# Patient Record
Sex: MALE | Race: WHITE | NOT HISPANIC OR LATINO | URBAN - METROPOLITAN AREA
[De-identification: names, ages, dates, MRNs, and addresses within clinical notes are randomized per-mention and may not be internally consistent; named-entity substitution may affect disease eponyms.]

---

## 2017-05-11 ENCOUNTER — OUTPATIENT (OUTPATIENT)
Dept: OUTPATIENT SERVICES | Facility: HOSPITAL | Age: 52
LOS: 1 days | End: 2017-05-11
Payer: COMMERCIAL

## 2017-05-11 ENCOUNTER — RESULT REVIEW (OUTPATIENT)
Age: 52
End: 2017-05-11

## 2017-05-11 DIAGNOSIS — D12.0 BENIGN NEOPLASM OF CECUM: ICD-10-CM

## 2017-05-11 PROCEDURE — 88305 TISSUE EXAM BY PATHOLOGIST: CPT | Mod: 26

## 2017-05-11 PROCEDURE — 88305 TISSUE EXAM BY PATHOLOGIST: CPT

## 2017-05-11 PROCEDURE — 45381 COLONOSCOPY SUBMUCOUS NJX: CPT

## 2017-05-11 PROCEDURE — 45385 COLONOSCOPY W/LESION REMOVAL: CPT

## 2017-05-11 PROCEDURE — 45380 COLONOSCOPY AND BIOPSY: CPT | Mod: XS

## 2017-05-12 LAB — SURGICAL PATHOLOGY STUDY: SIGNIFICANT CHANGE UP

## 2017-08-19 ENCOUNTER — TRANSCRIPTION ENCOUNTER (OUTPATIENT)
Age: 52
End: 2017-08-19

## 2018-09-05 ENCOUNTER — INPATIENT (INPATIENT)
Facility: HOSPITAL | Age: 53
LOS: 19 days | Discharge: HOME | DRG: 871 | End: 2018-09-25
Attending: INTERNAL MEDICINE | Admitting: INTERNAL MEDICINE
Payer: COMMERCIAL

## 2018-09-05 VITALS
DIASTOLIC BLOOD PRESSURE: 61 MMHG | OXYGEN SATURATION: 95 % | RESPIRATION RATE: 23 BRPM | TEMPERATURE: 99 F | HEART RATE: 91 BPM | SYSTOLIC BLOOD PRESSURE: 97 MMHG

## 2018-09-05 DIAGNOSIS — R18.8 OTHER ASCITES: ICD-10-CM

## 2018-09-05 DIAGNOSIS — K72.00 ACUTE AND SUBACUTE HEPATIC FAILURE WITHOUT COMA: ICD-10-CM

## 2018-09-05 DIAGNOSIS — A41.9 SEPSIS, UNSPECIFIED ORGANISM: ICD-10-CM

## 2018-09-05 DIAGNOSIS — F10.10 ALCOHOL ABUSE, UNCOMPLICATED: ICD-10-CM

## 2018-09-05 DIAGNOSIS — R09.89 OTHER SPECIFIED SYMPTOMS AND SIGNS INVOLVING THE CIRCULATORY AND RESPIRATORY SYSTEMS: ICD-10-CM

## 2018-09-05 DIAGNOSIS — D53.9 NUTRITIONAL ANEMIA, UNSPECIFIED: ICD-10-CM

## 2018-09-05 DIAGNOSIS — M72.0 PALMAR FASCIAL FIBROMATOSIS [DUPUYTREN]: Chronic | ICD-10-CM

## 2018-09-05 DIAGNOSIS — E87.1 HYPO-OSMOLALITY AND HYPONATREMIA: ICD-10-CM

## 2018-09-05 DIAGNOSIS — K70.10 ALCOHOLIC HEPATITIS WITHOUT ASCITES: ICD-10-CM

## 2018-09-05 DIAGNOSIS — I48.91 UNSPECIFIED ATRIAL FIBRILLATION: ICD-10-CM

## 2018-09-05 LAB
ALBUMIN SERPL ELPH-MCNC: 2.5 G/DL — LOW (ref 3.3–5)
ALP SERPL-CCNC: 153 U/L — HIGH (ref 40–120)
ALT FLD-CCNC: 87 U/L — HIGH (ref 10–45)
AMMONIA BLD-MCNC: 55 UMOL/L — SIGNIFICANT CHANGE UP (ref 11–55)
ANION GAP SERPL CALC-SCNC: 12 MMOL/L — SIGNIFICANT CHANGE UP (ref 5–17)
APTT BLD: 45.4 SEC — HIGH (ref 27.5–37.4)
AST SERPL-CCNC: 213 U/L — HIGH (ref 10–40)
BASE EXCESS BLDV CALC-SCNC: 4.2 MMOL/L — HIGH (ref -2–2)
BASOPHILS # BLD AUTO: 0 K/UL — SIGNIFICANT CHANGE UP (ref 0–0.2)
BASOPHILS NFR BLD AUTO: 0.3 % — SIGNIFICANT CHANGE UP (ref 0–2)
BILIRUB SERPL-MCNC: 9.2 MG/DL — HIGH (ref 0.2–1.2)
BUN SERPL-MCNC: 5 MG/DL — LOW (ref 7–23)
CA-I SERPL-SCNC: 1.02 MMOL/L — LOW (ref 1.12–1.3)
CALCIUM SERPL-MCNC: 8.1 MG/DL — LOW (ref 8.4–10.5)
CHLORIDE BLDV-SCNC: 83 MMOL/L — LOW (ref 96–108)
CHLORIDE SERPL-SCNC: 79 MMOL/L — LOW (ref 96–108)
CO2 BLDV-SCNC: 28 MMOL/L — SIGNIFICANT CHANGE UP (ref 22–30)
CO2 SERPL-SCNC: 24 MMOL/L — SIGNIFICANT CHANGE UP (ref 22–31)
CREAT SERPL-MCNC: 0.72 MG/DL — SIGNIFICANT CHANGE UP (ref 0.5–1.3)
EOSINOPHIL # BLD AUTO: 0.2 K/UL — SIGNIFICANT CHANGE UP (ref 0–0.5)
EOSINOPHIL NFR BLD AUTO: 1.2 % — SIGNIFICANT CHANGE UP (ref 0–6)
ETHANOL SERPL-MCNC: 12 MG/DL — HIGH (ref 0–10)
GAS PNL BLDV: 114 MMOL/L — CRITICAL LOW (ref 136–145)
GAS PNL BLDV: SIGNIFICANT CHANGE UP
GLUCOSE BLDV-MCNC: 129 MG/DL — HIGH (ref 70–99)
GLUCOSE SERPL-MCNC: 127 MG/DL — HIGH (ref 70–99)
HCO3 BLDV-SCNC: 27 MMOL/L — SIGNIFICANT CHANGE UP (ref 21–29)
HCT VFR BLD CALC: 36 % — LOW (ref 39–50)
HCT VFR BLDA CALC: 34 % — LOW (ref 39–50)
HGB BLD CALC-MCNC: 11.1 G/DL — LOW (ref 13–17)
HGB BLD-MCNC: 12.2 G/DL — LOW (ref 13–17)
INR BLD: 5.96 RATIO — CRITICAL HIGH (ref 0.88–1.16)
LACTATE BLDV-MCNC: 3.3 MMOL/L — HIGH (ref 0.7–2)
LYMPHOCYTES # BLD AUTO: 0.9 K/UL — LOW (ref 1–3.3)
LYMPHOCYTES # BLD AUTO: 5.9 % — LOW (ref 13–44)
MAGNESIUM SERPL-MCNC: 1.8 MG/DL — SIGNIFICANT CHANGE UP (ref 1.6–2.6)
MCHC RBC-ENTMCNC: 34 GM/DL — SIGNIFICANT CHANGE UP (ref 32–36)
MCHC RBC-ENTMCNC: 36.6 PG — HIGH (ref 27–34)
MCV RBC AUTO: 107 FL — HIGH (ref 80–100)
MONOCYTES # BLD AUTO: 1.3 K/UL — HIGH (ref 0–0.9)
MONOCYTES NFR BLD AUTO: 9.1 % — SIGNIFICANT CHANGE UP (ref 2–14)
NEUTROPHILS # BLD AUTO: 12.1 K/UL — HIGH (ref 1.8–7.4)
NEUTROPHILS NFR BLD AUTO: 83.4 % — HIGH (ref 43–77)
PCO2 BLDV: 35 MMHG — SIGNIFICANT CHANGE UP (ref 35–50)
PH BLDV: 7.5 — HIGH (ref 7.35–7.45)
PLAT MORPH BLD: NORMAL — SIGNIFICANT CHANGE UP
PLATELET # BLD AUTO: 154 K/UL — SIGNIFICANT CHANGE UP (ref 150–400)
PO2 BLDV: 40 MMHG — SIGNIFICANT CHANGE UP (ref 25–45)
POTASSIUM BLDV-SCNC: 3.3 MMOL/L — LOW (ref 3.5–5.3)
POTASSIUM SERPL-MCNC: 4.3 MMOL/L — SIGNIFICANT CHANGE UP (ref 3.5–5.3)
POTASSIUM SERPL-SCNC: 4.3 MMOL/L — SIGNIFICANT CHANGE UP (ref 3.5–5.3)
PROT SERPL-MCNC: 7.2 G/DL — SIGNIFICANT CHANGE UP (ref 6–8.3)
PROTHROM AB SERPL-ACNC: 66.7 SEC — HIGH (ref 9.8–12.7)
RBC # BLD: 3.35 M/UL — LOW (ref 4.2–5.8)
RBC # FLD: 12.7 % — SIGNIFICANT CHANGE UP (ref 10.3–14.5)
RBC BLD AUTO: SIGNIFICANT CHANGE UP
SAO2 % BLDV: 72 % — SIGNIFICANT CHANGE UP (ref 67–88)
SODIUM SERPL-SCNC: 117 MMOL/L — CRITICAL LOW (ref 135–145)
WBC # BLD: 15.3 K/UL — HIGH (ref 3.8–10.5)
WBC # FLD AUTO: 15.3 K/UL — HIGH (ref 3.8–10.5)

## 2018-09-05 PROCEDURE — 71045 X-RAY EXAM CHEST 1 VIEW: CPT | Mod: 26

## 2018-09-05 PROCEDURE — 99223 1ST HOSP IP/OBS HIGH 75: CPT

## 2018-09-05 PROCEDURE — 99285 EMERGENCY DEPT VISIT HI MDM: CPT

## 2018-09-05 RX ORDER — CEFTRIAXONE 500 MG/1
1 INJECTION, POWDER, FOR SOLUTION INTRAMUSCULAR; INTRAVENOUS ONCE
Qty: 0 | Refills: 0 | Status: COMPLETED | OUTPATIENT
Start: 2018-09-05 | End: 2018-09-05

## 2018-09-05 RX ORDER — THIAMINE MONONITRATE (VIT B1) 100 MG
100 TABLET ORAL DAILY
Qty: 0 | Refills: 0 | Status: DISCONTINUED | OUTPATIENT
Start: 2018-09-05 | End: 2018-09-25

## 2018-09-05 RX ORDER — INFLUENZA VIRUS VACCINE 15; 15; 15; 15 UG/.5ML; UG/.5ML; UG/.5ML; UG/.5ML
0.5 SUSPENSION INTRAMUSCULAR ONCE
Qty: 0 | Refills: 0 | Status: DISCONTINUED | OUTPATIENT
Start: 2018-09-05 | End: 2018-09-25

## 2018-09-05 RX ORDER — CEFTRIAXONE 500 MG/1
INJECTION, POWDER, FOR SOLUTION INTRAMUSCULAR; INTRAVENOUS
Qty: 0 | Refills: 0 | Status: DISCONTINUED | OUTPATIENT
Start: 2018-09-05 | End: 2018-09-10

## 2018-09-05 RX ORDER — SODIUM CHLORIDE 9 MG/ML
1000 INJECTION INTRAMUSCULAR; INTRAVENOUS; SUBCUTANEOUS ONCE
Qty: 0 | Refills: 0 | Status: COMPLETED | OUTPATIENT
Start: 2018-09-05 | End: 2018-09-05

## 2018-09-05 RX ORDER — METOPROLOL TARTRATE 50 MG
25 TABLET ORAL DAILY
Qty: 0 | Refills: 0 | Status: DISCONTINUED | OUTPATIENT
Start: 2018-09-05 | End: 2018-09-05

## 2018-09-05 RX ORDER — CEFTRIAXONE 500 MG/1
1 INJECTION, POWDER, FOR SOLUTION INTRAMUSCULAR; INTRAVENOUS EVERY 24 HOURS
Qty: 0 | Refills: 0 | Status: DISCONTINUED | OUTPATIENT
Start: 2018-09-06 | End: 2018-09-10

## 2018-09-05 RX ORDER — FOLIC ACID 0.8 MG
1 TABLET ORAL DAILY
Qty: 0 | Refills: 0 | Status: DISCONTINUED | OUTPATIENT
Start: 2018-09-05 | End: 2018-09-25

## 2018-09-05 RX ADMIN — Medication 25 MILLIGRAM(S): at 23:21

## 2018-09-05 RX ADMIN — CEFTRIAXONE 100 GRAM(S): 500 INJECTION, POWDER, FOR SOLUTION INTRAMUSCULAR; INTRAVENOUS at 19:13

## 2018-09-05 RX ADMIN — SODIUM CHLORIDE 1000 MILLILITER(S): 9 INJECTION INTRAMUSCULAR; INTRAVENOUS; SUBCUTANEOUS at 17:39

## 2018-09-05 RX ADMIN — Medication 25 MILLIGRAM(S): at 17:42

## 2018-09-05 NOTE — H&P ADULT - PROBLEM SELECTOR PLAN 2
Hypervolumic hyponatremia likely 2/2 ascities  Check serum osm, urine sodium (likely high, on lasix) and urine osm  Fluid restrict 1L unless becomes hypotensive  No signs of symptomatic hyopnatremia, will get repeat BMP in 6 hours, goal is 123 by tomorrow afternoon.   Consider renal consult.

## 2018-09-05 NOTE — H&P ADULT - HISTORY OF PRESENT ILLNESS
52M pmhx of Afib on xarelto, alcoholic hepatitis with cirrhosis and ascities who presents from outpt office for evalution of worsening LE edema.  He drinks 4-6 beers or hard liquor drinks per day every day for the last > 20 years.  Unsure if he has W/D becuase he has never been a day w/o drinking.  He denies any fevers or chills, no nausea, vomiting or diarrhea.  His abdomen has been swollen for a while and he recently had a sonogram which showed ascites.  His legs started swelling last week which was concerning to him so he went to his PCP today.  He denies any confusion or disorientation, no seizures or fatigue. 52M pmhx of Afib on xarelto, alcoholic hepatitis with cirrhosis and ascities who presents from outpt office for evalution of worsening LE edema.  He drinks 4-6 beers or hard liquor drinks per day every day for the last > 20 years.  Unsure if he has W/D becuase he has never been a day w/o drinking.  He denies any fevers or chills, no nausea, vomiting or diarrhea.  His abdomen has been swollen for a while and he recently had a sonogram which showed ascites.  His legs started swelling last week which was concerning to him so he went to his PCP today.  He denies any confusion or disorientation, no seizures or fatigue. Occasional black/dark stools last was this morning.  He says that he had a Cscope last year with polpys for which he was supposed to see surgery (doesn't have pathology) but has not been able to in the past year.  He says he has not had a screening EGD for varices and denies having major GI bleed in the past.  Has admitted to some recent nosebleeds. Has chronic slight cough, not productive of sputum.

## 2018-09-05 NOTE — ED ADULT NURSE NOTE - NSIMPLEMENTINTERV_GEN_ALL_ED
Implemented All Fall with Harm Risk Interventions:  [place] to call system. Call bell, personal items and telephone within reach. Instruct patient to call for assistance. Room bathroom lighting operational. Non-slip footwear when patient is off stretcher. Physically safe environment: no spills, clutter or unnecessary equipment. Stretcher in lowest position, wheels locked, appropriate side rails in place. Provide visual cue, wrist band, yellow gown, etc. Monitor gait and stability. Monitor for mental status changes and reorient to person, place, and time. Review medications for side effects contributing to fall risk. Reinforce activity limits and safety measures with patient and family. Provide visual clues: red socks.

## 2018-09-05 NOTE — ED PROVIDER NOTE - ATTENDING CONTRIBUTION TO CARE
53y/o M with h/o alcohol use (daily x 10 y) and worsening liver function on recent labs, presenting today with worsening abdominal distension, and b/l lower extremity edema.  Patient noted to be jaundiced, with palpable abdominal ascites, but no abdominal tenderness, afebrile.  A&Ox3, no signs of encephalopathy.  Concerned for worsening liver function and/or fulminant hepatic failure; will check cbc, cmp, lipase, and plan for admission for further evaluation/management.

## 2018-09-05 NOTE — H&P ADULT - PROBLEM SELECTOR PLAN 3
Diagnostic paracentesis before therapeutic paracentesis for ascites 2/2 alcoholic hepatitis. Diagnostic paracentesis before therapeutic paracentesis for ascites 2/2 alcoholic hepatitis.  Ordered Paracentesis

## 2018-09-05 NOTE — H&P ADULT - NSHPPHYSICALEXAM_GEN_ALL_CORE
Vital Signs Last 24 Hrs  T(C): 36.8 (05 Sep 2018 16:21), Max: 37 (05 Sep 2018 14:47)  T(F): 98.3 (05 Sep 2018 16:21), Max: 98.6 (05 Sep 2018 14:47)  HR: 91 (05 Sep 2018 17:43) (91 - 92)  BP: 115/68 (05 Sep 2018 17:43) (97/61 - 127/80)  BP(mean): --  RR: 18 (05 Sep 2018 17:43) (18 - 23)  SpO2: 95% (05 Sep 2018 17:43) (95% - 96%)

## 2018-09-05 NOTE — ED ADULT NURSE REASSESSMENT NOTE - NS ED NURSE REASSESS COMMENT FT1
Received report from Yolie FRANKLIN. Patient resting and comfortable. No complaints of pain at this time. VSS. Safety and comfort measures provided and maintained. CIWA=0. Pending bed.

## 2018-09-05 NOTE — H&P ADULT - PROBLEM SELECTOR PLAN 1
Start Ceftraixone( Would prefer to wait for paracentesis however pt was hypotensive on arrival and has severe sepsis)  Will stop with IVF given his total body overload should be careful with fluids  Repeat lactate now  Check blood cultures, urine culture, Urinalysis and Chest xray. Start Ceftraixone( Would prefer to wait for paracentesis however pt was hypotensive on arrival and has severe sepsis) to cover for SBP and strep cellulitis of leg (low suspicion).   Will stop with IVF given his total body overload should be careful with fluids  Repeat lactate now  Check blood cultures, urine culture, Urinalysis and Chest xray. Start Ceftraixone( Would prefer to wait for paracentesis however pt was hypotensive on arrival and has severe sepsis) to cover for SBP and strep cellulitis of leg (low suspicion).   Will stop with IVF given his total body overload should be careful with fluids  Repeat lactate now  Check blood cultures, urine culture, Urinalysis and Chest xray.  Lactate unlikely to clear given underlying liver disease.

## 2018-09-05 NOTE — ED ADULT NURSE NOTE - OBJECTIVE STATEMENT
53 y/o male presenting to the ED via walking in complaining of worsening of bilateral lower extremity edema, jaundice and abdominal bloating. Hx of Afib on Xarelto, alcohol induced liver disease, daily alcohol intake. Patient states went to PMD who sent him to the ED. Radha 51 y/o male presenting to the ED via walking in complaining of worsening of bilateral lower extremity edema, jaundice and abdominal bloating. Hx of Afib on Xarelto, alcohol induced liver disease, daily alcohol intake. Patient states last drink was this morning. Denies ever experiencing, seizures, tremors or other withdrawal symptoms. Patient has 4-6 drinks a day. Patient states went to PMD who sent him to the ED. Patient states edema in lower extremities and abdomen started in June of 2018 but worsened acutely x 2 weeks. +3 pitting edema noted to bilateral lower extremities accompanied by redness. Abdomen distended and firm to palpation. Patient denies abdominal pain, dizziness, n/v/d, numbness, tingling, difficulty breathing, SOB. Patient states feels some discomfort in lower extremities when walking but no pain. Positive jaundice. A&OX3. Safety and comfort measures provided.

## 2018-09-05 NOTE — H&P ADULT - PROBLEM SELECTOR PLAN 5
Continue B12 and Folic acid Continue B12 and Folic acid  No signs of active bleeding, never had screening EGD for varices. Brown stool on rectal.

## 2018-09-05 NOTE — ED PROVIDER NOTE - RAPID ASSESSMENT
Tianna Miguel ([facility]) attesting for Dr. Khan: 52M with PMHx of paroxysmal AFib, alcohol induced liver disease, long-standing alcohol use, currently on Xarelto, presents to the ED as directed by PMD with LE edema, onset June 2018, jaundice and abdominal bloating. Last drink was earlier this morning. Admits to having 4-6 drinks/day. Outpatient US done 7/31 remarkable for moderate amount of ascites. Pt was initially placed on Lasix, now on spironolactone.   Dr. Jigar Mckay (PMD with [facility])

## 2018-09-05 NOTE — ED PROVIDER NOTE - MEDICAL DECISION MAKING DETAILS
51y/o M with h/o alcohol use (daily x 10 y) and worsening liver function on recent labs, presenting today with worsening abdominal distension, and b/l lower extremity edema.  Patient noted to be jaundiced, with palpable abdominal ascites, but no abdominal tenderness, afebrile.  A&Ox3, no signs of encephalopathy.  Concerned for worsening liver function and/or fulminant hepatic failure; will check cbc, cmp, lipase, and plan for admission for further evaluation/management.

## 2018-09-05 NOTE — ED PROVIDER NOTE - OBJECTIVE STATEMENT
52 M w paroxysmal afib on xarelto, alcohol induced liver disease, alcohol abuse daily presents with gradual onset of bilateral lower extremity and abdominal edema since June. He was sent in by [facility] Jigar Mckay MD

## 2018-09-05 NOTE — H&P ADULT - NSHPLABSRESULTS_GEN_ALL_CORE
12.2   15.3  )-----------( 154      ( 05 Sep 2018 16:09 )             36.0     05 Sep 2018 16:09    117    |  79     |  5      ----------------------------<  127    4.3     |  24     |  0.72     Ca    8.1        05 Sep 2018 16:09  Phos  2.4       05 Sep 2018 16:09  Mg     1.8       05 Sep 2018 16:09    TPro  7.2    /  Alb  2.5    /  TBili  9.2    /  DBili  3.9    /  AST  213    /  ALT  87     /  AlkPhos  153    05 Sep 2018 16:09    LIVER FUNCTIONS - ( 05 Sep 2018 16:09 )  Alb: 2.5 g/dL / Pro: 7.2 g/dL / ALK PHOS: 153 U/L / ALT: 87 U/L / AST: 213 U/L / GGT: x           PT/INR - ( 05 Sep 2018 16:22 )   PT: 66.7 sec;   INR: 5.96 ratio         PTT - ( 05 Sep 2018 16:22 )  PTT:45.4 sec  CAPILLARY BLOOD GLUCOSE

## 2018-09-05 NOTE — H&P ADULT - PROBLEM SELECTOR PLAN 4
Cassandra's discriminant function is 258 which is confounded by use of Xarelto. Regardless, pt is not a transplant candidate as he is currently abusing ETOH, last drink was this morning.  Encourage alcohol cessation, patient has very poor prognosis at this point. Cassandra's discriminant function is 258 which is confounded by use of Xarelto. Regardless, pt is not a transplant candidate as he is currently abusing ETOH, last drink was this morning.  Encourage alcohol cessation, patient has very poor prognosis at this point.  Need to rule out infection before starting steroids for this. Cassandra's discriminant function is 258 which is confounded by use of Xarelto. Regardless, pt is not a transplant candidate as he is currently abusing ETOH, last drink was this morning.  Encourage alcohol cessation, patient has very poor prognosis at this point.  Need to rule out infection before starting steroids for this.  Lactate unlikely to clear given liver disease.

## 2018-09-05 NOTE — H&P ADULT - PROBLEM SELECTOR PLAN 6
Currently regular on exam, check EKG  Will hold Xarelto in setting of recurrent nosebleeds and INR >5. Currently regular on exam, check EKG  Will hold Xarelto in setting of recurrent nosebleeds and INR >5.  Holding Metoprolol in setting of severe sepsis and hypotension.

## 2018-09-06 LAB
ALBUMIN SERPL ELPH-MCNC: 2.2 G/DL — LOW (ref 3.3–5)
ALP SERPL-CCNC: 140 U/L — HIGH (ref 40–120)
ALT FLD-CCNC: 76 U/L — HIGH (ref 10–45)
ANION GAP SERPL CALC-SCNC: 11 MMOL/L — SIGNIFICANT CHANGE UP (ref 5–17)
ANION GAP SERPL CALC-SCNC: 12 MMOL/L — SIGNIFICANT CHANGE UP (ref 5–17)
ANION GAP SERPL CALC-SCNC: 13 MMOL/L — SIGNIFICANT CHANGE UP (ref 5–17)
ANION GAP SERPL CALC-SCNC: 14 MMOL/L — SIGNIFICANT CHANGE UP (ref 5–17)
ANION GAP SERPL CALC-SCNC: 15 MMOL/L — SIGNIFICANT CHANGE UP (ref 5–17)
APPEARANCE UR: ABNORMAL
APTT BLD: 42.2 SEC — HIGH (ref 27.5–37.4)
AST SERPL-CCNC: 181 U/L — HIGH (ref 10–40)
BILIRUB SERPL-MCNC: 9 MG/DL — HIGH (ref 0.2–1.2)
BILIRUB UR-MCNC: ABNORMAL
BUN SERPL-MCNC: 6 MG/DL — LOW (ref 7–23)
BUN SERPL-MCNC: 6 MG/DL — LOW (ref 7–23)
BUN SERPL-MCNC: 7 MG/DL — SIGNIFICANT CHANGE UP (ref 7–23)
BUN SERPL-MCNC: 8 MG/DL — SIGNIFICANT CHANGE UP (ref 7–23)
BUN SERPL-MCNC: 9 MG/DL — SIGNIFICANT CHANGE UP (ref 7–23)
CALCIUM SERPL-MCNC: 7.6 MG/DL — LOW (ref 8.4–10.5)
CALCIUM SERPL-MCNC: 7.8 MG/DL — LOW (ref 8.4–10.5)
CALCIUM SERPL-MCNC: 7.9 MG/DL — LOW (ref 8.4–10.5)
CALCIUM SERPL-MCNC: 8 MG/DL — LOW (ref 8.4–10.5)
CALCIUM SERPL-MCNC: 8.3 MG/DL — LOW (ref 8.4–10.5)
CHLORIDE SERPL-SCNC: 78 MMOL/L — LOW (ref 96–108)
CHLORIDE SERPL-SCNC: 78 MMOL/L — LOW (ref 96–108)
CHLORIDE SERPL-SCNC: 80 MMOL/L — LOW (ref 96–108)
CHLORIDE SERPL-SCNC: 82 MMOL/L — LOW (ref 96–108)
CHLORIDE SERPL-SCNC: 83 MMOL/L — LOW (ref 96–108)
CO2 SERPL-SCNC: 23 MMOL/L — SIGNIFICANT CHANGE UP (ref 22–31)
CO2 SERPL-SCNC: 24 MMOL/L — SIGNIFICANT CHANGE UP (ref 22–31)
CO2 SERPL-SCNC: 24 MMOL/L — SIGNIFICANT CHANGE UP (ref 22–31)
COLOR SPEC: ABNORMAL
CREAT SERPL-MCNC: 0.58 MG/DL — SIGNIFICANT CHANGE UP (ref 0.5–1.3)
CREAT SERPL-MCNC: 0.68 MG/DL — SIGNIFICANT CHANGE UP (ref 0.5–1.3)
CREAT SERPL-MCNC: 0.69 MG/DL — SIGNIFICANT CHANGE UP (ref 0.5–1.3)
CREAT SERPL-MCNC: 0.76 MG/DL — SIGNIFICANT CHANGE UP (ref 0.5–1.3)
CREAT SERPL-MCNC: 0.88 MG/DL — SIGNIFICANT CHANGE UP (ref 0.5–1.3)
DIFF PNL FLD: NEGATIVE — SIGNIFICANT CHANGE UP
GLUCOSE SERPL-MCNC: 105 MG/DL — HIGH (ref 70–99)
GLUCOSE SERPL-MCNC: 113 MG/DL — HIGH (ref 70–99)
GLUCOSE SERPL-MCNC: 116 MG/DL — HIGH (ref 70–99)
GLUCOSE SERPL-MCNC: 121 MG/DL — HIGH (ref 70–99)
GLUCOSE SERPL-MCNC: 125 MG/DL — HIGH (ref 70–99)
GLUCOSE UR QL: NEGATIVE — SIGNIFICANT CHANGE UP
HCT VFR BLD CALC: 30.1 % — LOW (ref 39–50)
HGB BLD-MCNC: 10.8 G/DL — LOW (ref 13–17)
INR BLD: 3.75 RATIO — HIGH (ref 0.88–1.16)
INR BLD: 5.14 RATIO — CRITICAL HIGH (ref 0.88–1.16)
KETONES UR-MCNC: NEGATIVE — SIGNIFICANT CHANGE UP
LACTATE SERPL-SCNC: 2 MMOL/L — SIGNIFICANT CHANGE UP (ref 0.7–2)
LEUKOCYTE ESTERASE UR-ACNC: NEGATIVE — SIGNIFICANT CHANGE UP
MAGNESIUM SERPL-MCNC: 1.8 MG/DL — SIGNIFICANT CHANGE UP (ref 1.6–2.6)
MCHC RBC-ENTMCNC: 35.8 PG — HIGH (ref 27–34)
MCHC RBC-ENTMCNC: 35.9 GM/DL — SIGNIFICANT CHANGE UP (ref 32–36)
MCV RBC AUTO: 99.7 FL — SIGNIFICANT CHANGE UP (ref 80–100)
NITRITE UR-MCNC: NEGATIVE — SIGNIFICANT CHANGE UP
OSMOLALITY SERPL: 247 MOS/KG — LOW (ref 275–300)
OSMOLALITY UR: 422 MOS/KG — SIGNIFICANT CHANGE UP (ref 50–1200)
PH UR: 6 — SIGNIFICANT CHANGE UP (ref 5–8)
PLATELET # BLD AUTO: 147 K/UL — LOW (ref 150–400)
POTASSIUM SERPL-MCNC: 3.2 MMOL/L — LOW (ref 3.5–5.3)
POTASSIUM SERPL-MCNC: 3.4 MMOL/L — LOW (ref 3.5–5.3)
POTASSIUM SERPL-MCNC: 3.8 MMOL/L — SIGNIFICANT CHANGE UP (ref 3.5–5.3)
POTASSIUM SERPL-MCNC: 3.9 MMOL/L — SIGNIFICANT CHANGE UP (ref 3.5–5.3)
POTASSIUM SERPL-MCNC: 4 MMOL/L — SIGNIFICANT CHANGE UP (ref 3.5–5.3)
POTASSIUM SERPL-SCNC: 3.2 MMOL/L — LOW (ref 3.5–5.3)
POTASSIUM SERPL-SCNC: 3.4 MMOL/L — LOW (ref 3.5–5.3)
POTASSIUM SERPL-SCNC: 3.8 MMOL/L — SIGNIFICANT CHANGE UP (ref 3.5–5.3)
POTASSIUM SERPL-SCNC: 3.9 MMOL/L — SIGNIFICANT CHANGE UP (ref 3.5–5.3)
POTASSIUM SERPL-SCNC: 4 MMOL/L — SIGNIFICANT CHANGE UP (ref 3.5–5.3)
PROT SERPL-MCNC: 6.4 G/DL — SIGNIFICANT CHANGE UP (ref 6–8.3)
PROT UR-MCNC: SIGNIFICANT CHANGE UP
PROTHROM AB SERPL-ACNC: 42 SEC — HIGH (ref 9.8–12.7)
PROTHROM AB SERPL-ACNC: 60 SEC — HIGH (ref 10–13.1)
RBC # BLD: 3.02 M/UL — LOW (ref 4.2–5.8)
RBC # FLD: 14 % — SIGNIFICANT CHANGE UP (ref 10.3–14.5)
SODIUM SERPL-SCNC: 112 MMOL/L — CRITICAL LOW (ref 135–145)
SODIUM SERPL-SCNC: 114 MMOL/L — CRITICAL LOW (ref 135–145)
SODIUM SERPL-SCNC: 116 MMOL/L — CRITICAL LOW (ref 135–145)
SODIUM SERPL-SCNC: 120 MMOL/L — CRITICAL LOW (ref 135–145)
SODIUM SERPL-SCNC: 121 MMOL/L — LOW (ref 135–145)
SODIUM UR-SCNC: <20 MMOL/L — SIGNIFICANT CHANGE UP
SODIUM UR-SCNC: <20 MMOL/L — SIGNIFICANT CHANGE UP
SP GR SPEC: 1.02 — SIGNIFICANT CHANGE UP (ref 1.01–1.02)
UROBILINOGEN FLD QL: 8 MG/DL
WBC # BLD: 13.2 K/UL — HIGH (ref 3.8–10.5)
WBC # FLD AUTO: 13.2 K/UL — HIGH (ref 3.8–10.5)

## 2018-09-06 PROCEDURE — 99255 IP/OBS CONSLTJ NEW/EST HI 80: CPT | Mod: GC

## 2018-09-06 PROCEDURE — 99232 SBSQ HOSP IP/OBS MODERATE 35: CPT

## 2018-09-06 RX ORDER — FUROSEMIDE 40 MG
20 TABLET ORAL ONCE
Qty: 0 | Refills: 0 | Status: COMPLETED | OUTPATIENT
Start: 2018-09-06 | End: 2018-09-06

## 2018-09-06 RX ORDER — POTASSIUM CHLORIDE 20 MEQ
40 PACKET (EA) ORAL ONCE
Qty: 0 | Refills: 0 | Status: COMPLETED | OUTPATIENT
Start: 2018-09-06 | End: 2018-09-06

## 2018-09-06 RX ORDER — PHYTONADIONE (VIT K1) 5 MG
5 TABLET ORAL ONCE
Qty: 0 | Refills: 0 | Status: COMPLETED | OUTPATIENT
Start: 2018-09-06 | End: 2018-09-06

## 2018-09-06 RX ADMIN — Medication 40 MILLIEQUIVALENT(S): at 09:26

## 2018-09-06 RX ADMIN — Medication 20 MILLIGRAM(S): at 19:36

## 2018-09-06 RX ADMIN — Medication 25 MILLIGRAM(S): at 10:52

## 2018-09-06 RX ADMIN — Medication 25 MILLIGRAM(S): at 19:36

## 2018-09-06 RX ADMIN — Medication 40 MILLIEQUIVALENT(S): at 04:38

## 2018-09-06 RX ADMIN — CEFTRIAXONE 100 GRAM(S): 500 INJECTION, POWDER, FOR SOLUTION INTRAMUSCULAR; INTRAVENOUS at 19:36

## 2018-09-06 RX ADMIN — Medication 100 MILLIGRAM(S): at 15:36

## 2018-09-06 RX ADMIN — Medication 20 MILLIGRAM(S): at 10:54

## 2018-09-06 RX ADMIN — Medication 1 MILLIGRAM(S): at 13:41

## 2018-09-06 RX ADMIN — Medication 5 MILLIGRAM(S): at 13:55

## 2018-09-06 NOTE — PROGRESS NOTE ADULT - ASSESSMENT
52-yo Male with pmhx of Afib on Xarelto, Alcoholic hepatitis presents with severe sepsis and ascites

## 2018-09-06 NOTE — CONSULT NOTE ADULT - ASSESSMENT
53 yo Male with pmhx of AF on xarelto, alcohol use c/b cirrhosis presents with sepsis concerning for SBP in setting of worsening abdominal distention and lower extremity edema, found to have hyposmolar hyponatremia     # Neurology  Alcohol withdrawal  - CIWA, librium taper, and ativan prn   # Pulmonary: no issues   # Cardiovascular:   Atrial Fibrillation (paroxysmal)   - Xarelto held 2/2 supratherapuetic INR  - Metoprolol held 2/2 sepsis and normotension and rate control   # Gastrointestinal   Alcoholic liver cirrhosis with concerns of SBP  - Ceftriaxone D2  - Plan for diagnostic paracentesis    # Metabolic   Hypo osmolar hyponatremia: multifactorial due to recent spironolactone and furosemide use; worsening hypervolemia. Although patient has been on fluid restriction for 1L, Na worsened from 117 to 112. 53 yo Male with pmhx of AF on xarelto, alcohol use c/b cirrhosis presents with sepsis concerning for SBP in setting of worsening abdominal distention and lower extremity edema, found to have hyposmolar hyponatremia     # Neurology  Alcohol withdrawal  - CIWA, librium taper, and ativan prn   # Pulmonary: no issues   # Cardiovascular:   Atrial Fibrillation (paroxysmal)   - Xarelto held 2/2 supratherapuetic INR  - Metoprolol held 2/2 sepsis and normotension and rate control   # Gastrointestinal   Alcoholic liver cirrhosis with concerns of SBP  - Ceftriaxone D2  - Plan for diagnostic paracentesis    # Metabolic   Hypo osmolar hyponatremia: multifactorial due to recent spironolactone and furosemide use; extrarenal losses from third spacing. Urine Na <20, Urine osm 249, Urine osm pending 51 yo Male with pmhx of AF on xarelto, alcohol use c/b cirrhosis presents with sepsis concerning for SBP in setting of worsening abdominal distention and lower extremity edema, found to have hyposmolar hyponatremia     # Metabolic   Hypo osmolar hyponatremia: multifactorial due to recent spironolactone and furosemide use; extrarenal losses from third spacing. Urine Na <20, Urine osm 249, Urine osm ~400s  Overnight, patient was fluid restricted to 1L and sodium levels remained stable. Would not recommend normal saline resuscitation at this time in setting of high urine osm. Would recommend renal team follow up. Patient is not a MICU candidate at this time; he is mentating well. 51 yo Male with pmhx of AF on xarelto, alcohol use c/b cirrhosis presents with sepsis concerning for SBP in setting of worsening abdominal distention and lower extremity edema, found to have hyposmolar hyponatremia     # Metabolic   Hypo osmolar hyponatremia: multifactorial due to recent spironolactone and furosemide use; extrarenal losses from third spacing; beer intake.  Urine Na <20, srm osm 249, Urine osm ~400s  Overnight, patient was fluid restricted to 1L and sodium levels remained stable. Would not recommend normal saline resuscitation at this time in setting of high urine osm. Would recommend renal team follow up for further decision making this AM. Patient is not a MICU candidate at this time; he is mentating well with stable hemodynamics and stable Na.

## 2018-09-06 NOTE — PROGRESS NOTE ADULT - PROBLEM SELECTOR PLAN 4
Cassandra's discriminant function is 258 which is confounded by use of Xarelto. Regardless, pt is not a transplant candidate as he is currently abusing ETOH, last drink was this morning.  Encourage alcohol cessation, patient has very poor prognosis at this point.  Need to rule out infection before starting steroids for this.  Lactate unlikely to clear given liver disease.  Appreciate GI

## 2018-09-06 NOTE — CONSULT NOTE ADULT - ASSESSMENT
52M pmhx of Afib on xarelto, alcoholic hepatitis with cirrhosis and ascities with severe hyponatremia and fluid overloaded    1- hyponatremia  2- ascites  3- alcoholism     very fluid overloaded   lasix 20 mg ivp bid   one liter fluid restriction   CIWA protocol   thiamine   given pt with cirrhosis will avoid samsca use

## 2018-09-06 NOTE — CONSULT NOTE ADULT - ASSESSMENT
Alcoholic Hepatitis  Decompensated Cirrhosis, presumed alcoholic, complicated by ascites, r/o SBP  Hyponatremia    Rec:  Discriminant function unreliable in the setting of xarelto use, but patient is not a candidate for steroids with probably LE cellulitis and possible SBP.  Correct coagulopathy and plan for diag paracentesis  Salt/fluid restriction.  Agree with empiric antibiotics ?need for Gram pos coverage for possible cellulitis.  CIWA     Cirrhosis Mgmt:  Fluid status: Would hold diuretics for now until SBP ruled out  Encephalopathy: None  Variceal Screening: Will need outpt EGD for variceal screening  HCC screening: Reported outpt US with ascites, unknown results for HCC screening Would follow with outpt ordering physician.  Vaccination status: Check Hep A/B immunity  HCM: Due for surveillance colonoscopy as outpatient.

## 2018-09-06 NOTE — CHART NOTE - NSCHARTNOTEFT_GEN_A_CORE
Notified by RN, patient has not urinated since beginning of the shift.  Patient seen and examined at bedside.  Patient reported that the last time he urinated was at 1230. Attempted several times, unable. Denies dysuria, urgency/frequency, or hx of retention. Denied headache, lightheadedness, fever, chills, SOB, CP, n/v/abd pain.    Review of Systems:  · General Symptoms: no fever; no sweating; no anorexia; no weight loss; no fatigue  · Respiratory and Thorax Symptoms: +cough; no wheezing; no hemoptysis  · Cardiovascular: negative  · Gastrointestinal: no nausea; no vomiting; no abdominal pain; + abdominal distension  · Genitourinary: +urinary retention  · Neurological: negative  · Hematology Symptoms: bruises easily; bleeds easily    Patient History:    Past Medical History:  Atrial fibrillation    Liver disease due to alcohol.     Past Surgical History:  Dupuytren contracture.      Vital Signs Last 24 Hrs  T(C): 36.8 (05 Sep 2018 21:40), Max: 37.2 (05 Sep 2018 19:37)  T(F): 98.2 (05 Sep 2018 21:40), Max: 98.9 (05 Sep 2018 19:37)  HR: 93 (05 Sep 2018 21:40) (87 - 93)  BP: 114/67 (05 Sep 2018 21:40) (97/61 - 127/80)  BP(mean): --  RR: 18 (05 Sep 2018 21:40) (18 - 23)  SpO2: 94% (05 Sep 2018 21:40) (94% - 96%)      Labs:                          12.2   15.3  )-----------( 154      ( 05 Sep 2018 16:09 )             36.0     09-05    112<LL>  |  78<L>  |  6<L>  ----------------------------<  113<H>  3.2<L>   |  23  |  0.68    Ca    7.8<L>      05 Sep 2018 23:58  Phos  2.4     09-05  Mg     1.8     09-05    TPro  7.2  /  Alb  2.5<L>  /  TBili  9.2<H>  /  DBili  3.9<H>  /  AST  213<H>  /  ALT  87<H>  /  AlkPhos  153<H>  09-05       Physical Exam:  · Constitutional: non toxic, volume overloaded, AAOx3  · Eyes: scleral icterus  . Respiratory: B/L Rales  · Cardiovascular: Regular rate & rhythm, normal S1, S2;  · Gastrointestinal: distended, tight, pitting edema of abdomen.  · Extremities: Anasarca with red skin rash that is warm to touch, confluent, non blanching, non purulent.      Assessment & Plan:  51 yo male with pmhx of Afib on xarelto, alcoholic hepatitis with cirrhosis and ascites admitted with severe sepsis, hyponatremia, anemia, and alcohol abuse now with worsening hyponatremia and urinary retention.    1. hyponatremia  - send urine lytes  - BMP Q 4hrs  -   >  >  >        Follow up with Attending in AM. Notified by RN, patient has not urinated since beginning of the shift.  Patient seen and examined at bedside.  Patient reported that the last time he urinated was at 1230. Attempted several times, unable. Denies dysuria, urgency/frequency, or hx of retention. Denied headache, lightheadedness, fever, chills, SOB, CP, n/v/abd pain.    Review of Systems:  · General Symptoms: no fever; no sweating; no anorexia; no weight loss; no fatigue  · Respiratory and Thorax Symptoms: +cough; no wheezing; no hemoptysis  · Cardiovascular: negative  · Gastrointestinal: no nausea; no vomiting; no abdominal pain; + abdominal distension  · Genitourinary: +urinary retention  · Neurological: negative  · Hematology Symptoms: bruises easily; bleeds easily    Patient History:    Past Medical History:  Atrial fibrillation    Liver disease due to alcohol.     Past Surgical History:  Dupuytren contracture.      Vital Signs Last 24 Hrs  T(C): 36.8 (05 Sep 2018 21:40), Max: 37.2 (05 Sep 2018 19:37)  T(F): 98.2 (05 Sep 2018 21:40), Max: 98.9 (05 Sep 2018 19:37)  HR: 93 (05 Sep 2018 21:40) (87 - 93)  BP: 114/67 (05 Sep 2018 21:40) (97/61 - 127/80)  BP(mean): --  RR: 18 (05 Sep 2018 21:40) (18 - 23)  SpO2: 94% (05 Sep 2018 21:40) (94% - 96%)      Physical Exam:  · Constitutional: non toxic, AAOx3  · Eyes: scleral icterus  . Respiratory: B/L Rales  · Cardiovascular: Regular rate & rhythm, normal S1, S2;  · Gastrointestinal: distended, tight, pitting edema of abdomen.  · Extremities: Anasarca with red skin rash that is warm to touch, non purulent.      Labs:                          12.2   15.3  )-----------( 154      ( 05 Sep 2018 16:09 )             36.0     09-05    112<LL>  |  78<L>  |  6<L>  ----------------------------<  113<H>  3.2<L>   |  23  |  0.68    Ca    7.8<L>      05 Sep 2018 23:58  Phos  2.4     09-05  Mg     1.8     09-05    TPro  7.2  /  Alb  2.5<L>  /  TBili  9.2<H>  /  DBili  3.9<H>  /  AST  213<H>  /  ALT  87<H>  /  AlkPhos  153<H>  09-05         Assessment & Plan:  51 yo male with pmhx of Afib on xarelto, alcoholic hepatitis with cirrhosis and ascites admitted with severe sepsis, hyponatremia, anemia, and alcohol abuse now with worsening hyponatremia and urinary retention.    1. hyponatremia  - send urine lytes  - BMP Q 4hrs  - Renal consult called by Attending  - neuro checks  - MICU consult called per Dr Mohr, awaiting recommendations    2. urinary retention  - bladder scan revealed >600  - straight cath x1  - Urology consult per Attending  - serial bladder scans  Discussed with Dr. Mohr and in agreement with the plan.      Analilia Cordoba, NP  Medicine  #[id number]

## 2018-09-06 NOTE — PROGRESS NOTE ADULT - SUBJECTIVE AND OBJECTIVE BOX
Patient is a 52y old  Male who presents with a chief complaint of Abdominal swelling (06 Sep 2018 02:22)      SUBJECTIVE / OVERNIGHT EVENTS:    Urinated blood-tinged urine x 1 since straight cath last night  Breathing is tolerable at rest  No fevers/chills/N/V/abd pain/shakes/agitation    Vital Signs Last 24 Hrs  T(C): 37.2 (06 Sep 2018 08:24), Max: 37.2 (05 Sep 2018 19:37)  T(F): 99 (06 Sep 2018 08:24), Max: 99 (06 Sep 2018 08:24)  HR: 102 (06 Sep 2018 08:24) (87 - 102)  BP: 110/- (06 Sep 2018 08:24) (97/61 - 127/80)  BP(mean): --  RR: 18 (06 Sep 2018 08:24) (18 - 23)  SpO2: 92% (06 Sep 2018 08:24) (92% - 96%)  I&O's Summary    06 Sep 2018 07:01  -  06 Sep 2018 11:15  --------------------------------------------------------  IN: 0 mL / OUT: 400 mL / NET: -400 mL        GENERAL: NAD, AAOx3  HEAD:  Atraumatic, Normocephalic  EYES: EOMI, PERRLA, conjunctiva and sclera clear  NECK: Supple, No JVD, No LAD  CHEST/LUNG: Clear to auscultation bilaterally; No wheeze  HEART: Regular rate and rhythm; No murmurs, rubs, or gallops  ABDOMEN: Markedly distended without focal tenderness  EXTREMITIES:  2+ Peripheral Pulses, No clubbing, cyanosis, or edema  SKIN: Anasarca with beefy red skin rash that is warm to touch, confluent, non blanching, non purulent.  NEURO: nonfocal CN/motor/sensory/reflexes    LABS:                        10.8   13.20 )-----------( 147      ( 06 Sep 2018 07:49 )             30.1     09-06    116<LL>  |  80<L>  |  6<L>  ----------------------------<  105<H>  3.4<L>   |  24  |  0.69    Ca    7.6<L>      06 Sep 2018 06:15  Phos  2.4       Mg     1.8         TPro  6.4  /  Alb  2.2<L>  /  TBili  9.0<H>  /  DBili  x   /  AST  181<H>  /  ALT  76<H>  /  AlkPhos  140<H>      PT/INR - ( 06 Sep 2018 07:49 )   PT: 60.0 sec;   INR: 5.14 ratio         PTT - ( 06 Sep 2018 07:49 )  PTT:42.2 sec  CAPILLARY BLOOD GLUCOSE            Urinalysis Basic - ( 06 Sep 2018 01:50 )    Color: Brown / Appearance: SL Turbid / S.018 / pH: x  Gluc: x / Ketone: Negative  / Bili: Moderate / Urobili: 8 mg/dL   Blood: x / Protein: Trace / Nitrite: Negative   Leuk Esterase: Negative / RBC: 0-2 /HPF / WBC 3-5 /HPF   Sq Epi: x / Non Sq Epi: OCC /HPF / Bacteria: Few /HPF        RADIOLOGY & ADDITIONAL TESTS:    Imaging Personally Reviewed:  [x] YES  [ ] NO    Consultant(s) Notes Reviewed:  [x] YES  [ ] NO      MEDICATIONS  (STANDING):  cefTRIAXone   IVPB      cefTRIAXone   IVPB 1 Gram(s) IV Intermittent every 24 hours  chlordiazePOXIDE 25 milliGRAM(s) Oral every 8 hours  folic acid 1 milliGRAM(s) Oral daily  influenza   Vaccine 0.5 milliLiter(s) IntraMuscular once  thiamine 100 milliGRAM(s) Oral daily    MEDICATIONS  (PRN):  LORazepam   Injectable 2 milliGRAM(s) IntraMuscular every 4 hours PRN CIWA > 8      Care Discussed with Consultants/Other Providers [x] YES  [ ] NO    HEALTH ISSUES - PROBLEM Dx:  Suspected deep vein thrombosis  Alcohol abuse: Alcohol abuse  Afib: Afib  Macrocytic anemia: Macrocytic anemia  Alcoholic hepatitis: Alcoholic hepatitis  Ascites: Ascites  Hyponatremia: Hyponatremia  Severe sepsis: Severe sepsis

## 2018-09-07 DIAGNOSIS — M79.89 OTHER SPECIFIED SOFT TISSUE DISORDERS: ICD-10-CM

## 2018-09-07 LAB
ANION GAP SERPL CALC-SCNC: 10 MMOL/L — SIGNIFICANT CHANGE UP (ref 5–17)
ANION GAP SERPL CALC-SCNC: 12 MMOL/L — SIGNIFICANT CHANGE UP (ref 5–17)
ANION GAP SERPL CALC-SCNC: 13 MMOL/L — SIGNIFICANT CHANGE UP (ref 5–17)
BUN SERPL-MCNC: 9 MG/DL — SIGNIFICANT CHANGE UP (ref 7–23)
CALCIUM SERPL-MCNC: 7.7 MG/DL — LOW (ref 8.4–10.5)
CALCIUM SERPL-MCNC: 8 MG/DL — LOW (ref 8.4–10.5)
CALCIUM SERPL-MCNC: 8.1 MG/DL — LOW (ref 8.4–10.5)
CALCIUM SERPL-MCNC: 8.2 MG/DL — LOW (ref 8.4–10.5)
CALCIUM SERPL-MCNC: 8.5 MG/DL — SIGNIFICANT CHANGE UP (ref 8.4–10.5)
CHLORIDE SERPL-SCNC: 82 MMOL/L — LOW (ref 96–108)
CHLORIDE SERPL-SCNC: 82 MMOL/L — LOW (ref 96–108)
CHLORIDE SERPL-SCNC: 83 MMOL/L — LOW (ref 96–108)
CHLORIDE SERPL-SCNC: 84 MMOL/L — LOW (ref 96–108)
CHLORIDE SERPL-SCNC: 84 MMOL/L — LOW (ref 96–108)
CO2 SERPL-SCNC: 25 MMOL/L — SIGNIFICANT CHANGE UP (ref 22–31)
CO2 SERPL-SCNC: 26 MMOL/L — SIGNIFICANT CHANGE UP (ref 22–31)
CREAT SERPL-MCNC: 0.77 MG/DL — SIGNIFICANT CHANGE UP (ref 0.5–1.3)
CREAT SERPL-MCNC: 0.78 MG/DL — SIGNIFICANT CHANGE UP (ref 0.5–1.3)
CREAT SERPL-MCNC: 0.81 MG/DL — SIGNIFICANT CHANGE UP (ref 0.5–1.3)
CREAT SERPL-MCNC: 0.83 MG/DL — SIGNIFICANT CHANGE UP (ref 0.5–1.3)
CREAT SERPL-MCNC: 0.9 MG/DL — SIGNIFICANT CHANGE UP (ref 0.5–1.3)
CULTURE RESULTS: NO GROWTH — SIGNIFICANT CHANGE UP
GLUCOSE SERPL-MCNC: 105 MG/DL — HIGH (ref 70–99)
GLUCOSE SERPL-MCNC: 110 MG/DL — HIGH (ref 70–99)
GLUCOSE SERPL-MCNC: 123 MG/DL — HIGH (ref 70–99)
GLUCOSE SERPL-MCNC: 125 MG/DL — HIGH (ref 70–99)
GLUCOSE SERPL-MCNC: 126 MG/DL — HIGH (ref 70–99)
POTASSIUM SERPL-MCNC: 3.5 MMOL/L — SIGNIFICANT CHANGE UP (ref 3.5–5.3)
POTASSIUM SERPL-MCNC: 3.6 MMOL/L — SIGNIFICANT CHANGE UP (ref 3.5–5.3)
POTASSIUM SERPL-MCNC: 3.8 MMOL/L — SIGNIFICANT CHANGE UP (ref 3.5–5.3)
POTASSIUM SERPL-MCNC: 4 MMOL/L — SIGNIFICANT CHANGE UP (ref 3.5–5.3)
POTASSIUM SERPL-MCNC: 4.5 MMOL/L — SIGNIFICANT CHANGE UP (ref 3.5–5.3)
POTASSIUM SERPL-SCNC: 3.5 MMOL/L — SIGNIFICANT CHANGE UP (ref 3.5–5.3)
POTASSIUM SERPL-SCNC: 3.6 MMOL/L — SIGNIFICANT CHANGE UP (ref 3.5–5.3)
POTASSIUM SERPL-SCNC: 3.8 MMOL/L — SIGNIFICANT CHANGE UP (ref 3.5–5.3)
POTASSIUM SERPL-SCNC: 4 MMOL/L — SIGNIFICANT CHANGE UP (ref 3.5–5.3)
POTASSIUM SERPL-SCNC: 4.5 MMOL/L — SIGNIFICANT CHANGE UP (ref 3.5–5.3)
SODIUM SERPL-SCNC: 120 MMOL/L — CRITICAL LOW (ref 135–145)
SODIUM SERPL-SCNC: 120 MMOL/L — CRITICAL LOW (ref 135–145)
SODIUM SERPL-SCNC: 121 MMOL/L — LOW (ref 135–145)
SODIUM SERPL-SCNC: 122 MMOL/L — LOW (ref 135–145)
SODIUM SERPL-SCNC: 122 MMOL/L — LOW (ref 135–145)
SPECIMEN SOURCE: SIGNIFICANT CHANGE UP

## 2018-09-07 RX ORDER — PHYTONADIONE (VIT K1) 5 MG
5 TABLET ORAL ONCE
Qty: 0 | Refills: 0 | Status: COMPLETED | OUTPATIENT
Start: 2018-09-07 | End: 2018-09-07

## 2018-09-07 RX ORDER — FUROSEMIDE 40 MG
20 TABLET ORAL
Qty: 0 | Refills: 0 | Status: DISCONTINUED | OUTPATIENT
Start: 2018-09-07 | End: 2018-09-08

## 2018-09-07 RX ADMIN — Medication 5 MILLIGRAM(S): at 09:38

## 2018-09-07 RX ADMIN — Medication 25 MILLIGRAM(S): at 11:09

## 2018-09-07 RX ADMIN — Medication 20 MILLIGRAM(S): at 04:50

## 2018-09-07 RX ADMIN — Medication 20 MILLIGRAM(S): at 18:43

## 2018-09-07 RX ADMIN — Medication 2 MILLIGRAM(S): at 13:53

## 2018-09-07 RX ADMIN — CEFTRIAXONE 100 GRAM(S): 500 INJECTION, POWDER, FOR SOLUTION INTRAMUSCULAR; INTRAVENOUS at 18:43

## 2018-09-07 RX ADMIN — Medication 100 MILLIGRAM(S): at 11:10

## 2018-09-07 RX ADMIN — Medication 25 MILLIGRAM(S): at 02:23

## 2018-09-07 RX ADMIN — Medication 1 MILLIGRAM(S): at 11:10

## 2018-09-07 RX ADMIN — Medication 25 MILLIGRAM(S): at 18:43

## 2018-09-07 NOTE — PROVIDER CONTACT NOTE (CRITICAL VALUE NOTIFICATION) - BACKGROUND
Patient admitted on 9/5/18 for severe sepsis, hyponatremia, ascites, alcoholic hepatitis and alcohol abuse.
Acute liver failure, A-fib, alcohol abuse, ascites
Patient admitted on 9/5/18 for severe sepsis, hyponatremia, ascites, alcoholic hepatitis and ETOH abuse. Patient has PMH of afib; xarelto on hold.
Patient admitted on 9/5/18 for severe sepsis, hyponatremia, ascites, alcoholic hepatitis and alcohol abuse.
Patient admitted on 9/5/18 for severe sepsis, hyponatremia, ascites, alcoholic hepatitis and alcohol abuse.
pt admitted for abd swelling
Patient admitted on 9/5/18 for severe sepsis, hyponatremia, ascites, alcoholic hepatitis and alcohol abuse.

## 2018-09-07 NOTE — PROGRESS NOTE ADULT - ASSESSMENT
Alcoholic Hepatitis  Decompensated Cirrhosis, presumed alcoholic, complicated by ascites, r/o SBP  Hyponatremia    Rec:  Discriminant function unreliable in the setting of xarelto use, but patient is not a candidate for steroids with probably LE cellulitis and possible SBP.  Correct coagulopathy and plan for diag paracentesis  Salt/fluid restriction.  Agree with empiric antibiotics  CIWA     Cirrhosis Mgmt:  Fluid status: Would hold diuretics for now until SBP ruled out  Encephalopathy: None  Variceal Screening: Will need outpt EGD for variceal screening  HCC screening: Reported outpt US with ascites, unknown results for HCC screening Would follow with outpt ordering physician.  Vaccination status: Check Hep A/B immunity  HCM: Due for surveillance colonoscopy as outpatient.

## 2018-09-07 NOTE — PROGRESS NOTE ADULT - PROBLEM SELECTOR PLAN 4
Cassandra's discriminant function is 258 which is confounded by use of Xarelto. Regardless, pt is not a transplant candidate as he is currently abusing ETOH, last drink was on morning of admission  Encourage alcohol cessation, patient has very poor prognosis at this point.  Holding off steroids given sepsis upon admission  Appreciate GI

## 2018-09-07 NOTE — PROGRESS NOTE ADULT - SUBJECTIVE AND OBJECTIVE BOX
No change in clinical status.  No current complaints.      PAST MEDICAL & SURGICAL HISTORY:  Atrial fibrillation  Liver disease due to alcohol  Dupuytren contracture      MEDICATIONS  (STANDING):  cefTRIAXone   IVPB      cefTRIAXone   IVPB 1 Gram(s) IV Intermittent every 24 hours  chlordiazePOXIDE 25 milliGRAM(s) Oral every 8 hours  folic acid 1 milliGRAM(s) Oral daily  influenza   Vaccine 0.5 milliLiter(s) IntraMuscular once  thiamine 100 milliGRAM(s) Oral daily    MEDICATIONS  (PRN):  LORazepam   Injectable 2 milliGRAM(s) IntraMuscular every 4 hours PRN CIWA > 8      Allergies    No Known Allergies    Intolerances        Review of Systems:    General:  No wt loss, fevers, chills, night sweats,fatigue,   CV:  No pain, palpitations, hypo/hypertension  Resp:  No dyspnea, cough, tachypnea, wheezing  GI:  see HPI  :  No pain, bleeding, incontinence, nocturia  Muscle:  No pain, weakness  Neuro:  No weakness, tingling, memory problems  Psych:  No fatigue, insomnia, mood problems, depression  Endocrine:  No polyuria, polydypsia, cold/heat intolerance  Heme:  No petechiae, ecchymosis, easy bruisability  Skin:  No rash, tattoos, scars, edema    Vital Signs Last 24 Hrs  T(C): 36.6 (07 Sep 2018 15:21), Max: 37.1 (06 Sep 2018 20:10)  T(F): 97.9 (07 Sep 2018 15:21), Max: 98.8 (06 Sep 2018 20:10)  HR: 129 (07 Sep 2018 15:21) (109 - 129)  BP: 117/68 (07 Sep 2018 15:21) (100/46 - 138/90)  BP(mean): --  RR: 18 (07 Sep 2018 15:21) (16 - 18)  SpO2: 96% (07 Sep 2018 15:21) (92% - 97%)    PHYSICAL EXAM:    Constitutional: NAD, well-developed  Neck: No LAD, supple  Respiratory: CTA and P  Cardiovascular: S1 and S2, RRR, no M  Gastrointestinal: BS+, soft, NT. Distended., neg HSM,  Extremities: LE pitting edema with b/l erythema.  Vascular: 2+ peripheral pulses  Neurological: A/O x 3, no focal deficits  Psychiatric: Normal mood, normal affect  Skin: No rashes        LABS:                        10.8   13.20 )-----------( 147      ( 06 Sep 2018 07:49 )             30.1         121<L>  |  82<L>  |  9   ----------------------------<  126<H>  3.6   |  26  |  0.77    Ca    8.1<L>      07 Sep 2018 10:41  Mg     1.8         TPro  6.4  /  Alb  2.2<L>  /  TBili  9.0<H>  /  DBili  x   /  AST  181<H>  /  ALT  76<H>  /  AlkPhos  140<H>      LIVER FUNCTIONS - ( 06 Sep 2018 06:15 )  Alb: 2.2 g/dL / Pro: 6.4 g/dL / ALK PHOS: 140 U/L / ALT: 76 U/L / AST: 181 U/L / GGT: x           PT/INR - ( 06 Sep 2018 23:00 )   PT: 42.0 sec;   INR: 3.75 ratio         PTT - ( 06 Sep 2018 07:49 )  PTT:42.2 sec  Urinalysis Basic - ( 06 Sep 2018 01:50 )    Color: Brown / Appearance: SL Turbid / S.018 / pH: x  Gluc: x / Ketone: Negative  / Bili: Moderate / Urobili: 8 mg/dL   Blood: x / Protein: Trace / Nitrite: Negative   Leuk Esterase: Negative / RBC: 0-2 /HPF / WBC 3-5 /HPF   Sq Epi: x / Non Sq Epi: OCC /HPF / Bacteria: Few /HPF                    RADIOLOGY & ADDITIONAL TESTS:

## 2018-09-07 NOTE — PROGRESS NOTE ADULT - SUBJECTIVE AND OBJECTIVE BOX
[facility]   [phone]  RENAL FOLLOW UP NOTE  --------------------------------------------------------------------------------  Chief Complaint:    24 hour events/subjective:    seen earlier. states urinating well     PAST HISTORY  --------------------------------------------------------------------------------  No significant changes to PMH, PSH, FHx, SHx, unless otherwise noted    ALLERGIES & MEDICATIONS  --------------------------------------------------------------------------------  Allergies    No Known Allergies    Intolerances      Standing Inpatient Medications  cefTRIAXone   IVPB      cefTRIAXone   IVPB 1 Gram(s) IV Intermittent every 24 hours  chlordiazePOXIDE 25 milliGRAM(s) Oral every 8 hours  folic acid 1 milliGRAM(s) Oral daily  furosemide   Injectable 20 milliGRAM(s) IV Push two times a day  influenza   Vaccine 0.5 milliLiter(s) IntraMuscular once  thiamine 100 milliGRAM(s) Oral daily    PRN Inpatient Medications  LORazepam   Injectable 2 milliGRAM(s) IntraMuscular every 4 hours PRN      REVIEW OF SYSTEMS  --------------------------------------------------------------------------------    Gen: denies fatigue, fevers/chills,  CVS: denies chest pain/palpitations  Resp: denies SOB/Cough  GI: Denies N/V/Abd pain  : Denies dysuria/oliguria/hematuria    All other systems were reviewed and are negative, except as noted.    VITALS/PHYSICAL EXAM  --------------------------------------------------------------------------------  T(C): 36.9 (09-07-18 @ 18:44), Max: 37.1 (09-06-18 @ 20:10)  HR: 124 (09-07-18 @ 18:44) (109 - 129)  BP: 115/73 (09-07-18 @ 18:44) (100/46 - 138/90)  RR: 18 (09-07-18 @ 18:44) (16 - 18)  SpO2: 94% (09-07-18 @ 18:44) (92% - 97%)  Wt(kg): --  Height (cm): 172.72 (09-06-18 @ 10:37)  Weight (kg): 92.4 (09-06-18 @ 10:37)  BMI (kg/m2): 31 (09-06-18 @ 10:37)  BSA (m2): 2.06 (09-06-18 @ 10:37)      09-06-18 @ 07:01  -  09-07-18 @ 07:00  --------------------------------------------------------  IN: 940 mL / OUT: 1950 mL / NET: -1010 mL    09-07-18 @ 07:01  -  09-07-18 @ 18:53  --------------------------------------------------------  IN: 480 mL / OUT: 200 mL / NET: 280 mL      Physical Exam:  	  Gen:  comfortable appearing   	no jvd , supple neck,   	Pulm: decrease bs  no rales or ronchi or wheezing  	CV: RRR, S1S2; no rub  	Abd: +BS, soft, + ascites   	: No suprapubic tenderness  	UE: Warm, no cyanosis  no clubbing,  no edema; no asterixis  	LE: Warm, no cyanosis  no clubbing, 3+ edema  	Neuro: alert and oriented. speech coherent   	    LABS/STUDIES  --------------------------------------------------------------------------------              10.8   13.20 >-----------<  147      [09-06-18 @ 07:49]              30.1     120  |  82  |  9   ----------------------------<  123      [09-07-18 @ 16:32]  3.8   |  25  |  0.78        Ca     8.5     [09-07-18 @ 16:32]      Mg     1.8     [09-06-18 @ 06:15]    TPro  6.4  /  Alb  2.2  /  TBili  9.0  /  DBili  x   /  AST  181  /  ALT  76  /  AlkPhos  140  [09-06-18 @ 06:15]    PT/INR: PT 42.0 , INR 3.75       [09-06-18 @ 23:00]  PTT: 42.2       [09-06-18 @ 07:49]    Serum Osmolality 247      [09-05-18 @ 23:58]    Creatinine Trend:  SCr 0.78 [09-07 @ 16:32]  SCr 0.77 [09-07 @ 10:41]  SCr 0.90 [09-07 @ 06:33]  SCr 0.83 [09-07 @ 02:23]  SCr 0.88 [09-06 @ 23:00]              Urinalysis - [09-06-18 @ 01:50]      Color Brown / Appearance SL Turbid / SG 1.018 / pH 6.0      Gluc Negative / Ketone Negative  / Bili Moderate / Urobili 8       Blood Negative / Protein Trace / Leuk Est Negative / Nitrite Negative      RBC 0-2 / WBC 3-5 / Hyaline 0-2 / Gran  / Sq Epi  / Non Sq Epi OCC / Bacteria Few    Urine Sodium <20      [09-06-18 @ 11:26]  Urine Osmolality 422      [09-06-18 @ 04:57]

## 2018-09-07 NOTE — PROVIDER CONTACT NOTE (CRITICAL VALUE NOTIFICATION) - RECOMMENDATIONS
NP contacted and aware.
PA made aware

## 2018-09-08 LAB
ALBUMIN SERPL ELPH-MCNC: 2.6 G/DL — LOW (ref 3.3–5)
ALP SERPL-CCNC: 145 U/L — HIGH (ref 40–120)
ALT FLD-CCNC: 73 U/L — HIGH (ref 10–45)
ANION GAP SERPL CALC-SCNC: 13 MMOL/L — SIGNIFICANT CHANGE UP (ref 5–17)
ANION GAP SERPL CALC-SCNC: 13 MMOL/L — SIGNIFICANT CHANGE UP (ref 5–17)
ANION GAP SERPL CALC-SCNC: 15 MMOL/L — SIGNIFICANT CHANGE UP (ref 5–17)
ANION GAP SERPL CALC-SCNC: 16 MMOL/L — SIGNIFICANT CHANGE UP (ref 5–17)
AST SERPL-CCNC: 174 U/L — HIGH (ref 10–40)
BILIRUB SERPL-MCNC: 6.9 MG/DL — HIGH (ref 0.2–1.2)
BUN SERPL-MCNC: 10 MG/DL — SIGNIFICANT CHANGE UP (ref 7–23)
BUN SERPL-MCNC: 10 MG/DL — SIGNIFICANT CHANGE UP (ref 7–23)
BUN SERPL-MCNC: 9 MG/DL — SIGNIFICANT CHANGE UP (ref 7–23)
BUN SERPL-MCNC: 9 MG/DL — SIGNIFICANT CHANGE UP (ref 7–23)
CALCIUM SERPL-MCNC: 8.5 MG/DL — SIGNIFICANT CHANGE UP (ref 8.4–10.5)
CALCIUM SERPL-MCNC: 8.5 MG/DL — SIGNIFICANT CHANGE UP (ref 8.4–10.5)
CALCIUM SERPL-MCNC: 8.6 MG/DL — SIGNIFICANT CHANGE UP (ref 8.4–10.5)
CALCIUM SERPL-MCNC: 8.9 MG/DL — SIGNIFICANT CHANGE UP (ref 8.4–10.5)
CHLORIDE SERPL-SCNC: 84 MMOL/L — LOW (ref 96–108)
CHLORIDE SERPL-SCNC: 86 MMOL/L — LOW (ref 96–108)
CO2 SERPL-SCNC: 25 MMOL/L — SIGNIFICANT CHANGE UP (ref 22–31)
CO2 SERPL-SCNC: 26 MMOL/L — SIGNIFICANT CHANGE UP (ref 22–31)
CREAT SERPL-MCNC: 0.73 MG/DL — SIGNIFICANT CHANGE UP (ref 0.5–1.3)
CREAT SERPL-MCNC: 0.76 MG/DL — SIGNIFICANT CHANGE UP (ref 0.5–1.3)
CREAT SERPL-MCNC: 0.78 MG/DL — SIGNIFICANT CHANGE UP (ref 0.5–1.3)
CREAT SERPL-MCNC: 0.84 MG/DL — SIGNIFICANT CHANGE UP (ref 0.5–1.3)
FERRITIN SERPL-MCNC: 1886 NG/ML — HIGH (ref 30–400)
GLUCOSE SERPL-MCNC: 124 MG/DL — HIGH (ref 70–99)
GLUCOSE SERPL-MCNC: 128 MG/DL — HIGH (ref 70–99)
GLUCOSE SERPL-MCNC: 95 MG/DL — SIGNIFICANT CHANGE UP (ref 70–99)
GLUCOSE SERPL-MCNC: 99 MG/DL — SIGNIFICANT CHANGE UP (ref 70–99)
HAV IGG SER QL IA: REACTIVE
HBV CORE AB SER-ACNC: SIGNIFICANT CHANGE UP
HBV SURFACE AB SER-ACNC: SIGNIFICANT CHANGE UP
HBV SURFACE AG SER-ACNC: SIGNIFICANT CHANGE UP
HCT VFR BLD CALC: 29.6 % — LOW (ref 39–50)
HCV AB S/CO SERPL IA: 0.25 S/CO — SIGNIFICANT CHANGE UP
HCV AB SERPL-IMP: SIGNIFICANT CHANGE UP
HGB BLD-MCNC: 10.5 G/DL — LOW (ref 13–17)
INR BLD: 2.12 RATIO — HIGH (ref 0.88–1.16)
IRON SATN MFR SERPL: 67 UG/DL — SIGNIFICANT CHANGE UP (ref 45–165)
IRON SATN MFR SERPL: 71 % — HIGH (ref 16–55)
MCHC RBC-ENTMCNC: 35.5 GM/DL — SIGNIFICANT CHANGE UP (ref 32–36)
MCHC RBC-ENTMCNC: 36.7 PG — HIGH (ref 27–34)
MCV RBC AUTO: 103.5 FL — HIGH (ref 80–100)
PLATELET # BLD AUTO: 136 K/UL — LOW (ref 150–400)
POTASSIUM SERPL-MCNC: 3.6 MMOL/L — SIGNIFICANT CHANGE UP (ref 3.5–5.3)
POTASSIUM SERPL-MCNC: 3.7 MMOL/L — SIGNIFICANT CHANGE UP (ref 3.5–5.3)
POTASSIUM SERPL-MCNC: 3.7 MMOL/L — SIGNIFICANT CHANGE UP (ref 3.5–5.3)
POTASSIUM SERPL-MCNC: 4.5 MMOL/L — SIGNIFICANT CHANGE UP (ref 3.5–5.3)
POTASSIUM SERPL-SCNC: 3.6 MMOL/L — SIGNIFICANT CHANGE UP (ref 3.5–5.3)
POTASSIUM SERPL-SCNC: 3.7 MMOL/L — SIGNIFICANT CHANGE UP (ref 3.5–5.3)
POTASSIUM SERPL-SCNC: 3.7 MMOL/L — SIGNIFICANT CHANGE UP (ref 3.5–5.3)
POTASSIUM SERPL-SCNC: 4.5 MMOL/L — SIGNIFICANT CHANGE UP (ref 3.5–5.3)
PROT SERPL-MCNC: 6.6 G/DL — SIGNIFICANT CHANGE UP (ref 6–8.3)
PROTHROM AB SERPL-ACNC: 24.3 SEC — HIGH (ref 10–13.1)
RBC # BLD: 2.86 M/UL — LOW (ref 4.2–5.8)
RBC # FLD: 14.5 % — SIGNIFICANT CHANGE UP (ref 10.3–14.5)
SODIUM SERPL-SCNC: 122 MMOL/L — LOW (ref 135–145)
SODIUM SERPL-SCNC: 125 MMOL/L — LOW (ref 135–145)
SODIUM SERPL-SCNC: 125 MMOL/L — LOW (ref 135–145)
SODIUM SERPL-SCNC: 126 MMOL/L — LOW (ref 135–145)
TIBC SERPL-MCNC: 94 UG/DL — LOW (ref 220–430)
UIBC SERPL-MCNC: 27 UG/DL — LOW (ref 110–370)
WBC # BLD: 11.58 K/UL — HIGH (ref 3.8–10.5)
WBC # FLD AUTO: 11.58 K/UL — HIGH (ref 3.8–10.5)

## 2018-09-08 PROCEDURE — 93970 EXTREMITY STUDY: CPT | Mod: 26

## 2018-09-08 RX ORDER — METOPROLOL TARTRATE 50 MG
12.5 TABLET ORAL ONCE
Qty: 0 | Refills: 0 | Status: COMPLETED | OUTPATIENT
Start: 2018-09-08 | End: 2018-09-08

## 2018-09-08 RX ORDER — POTASSIUM CHLORIDE 20 MEQ
40 PACKET (EA) ORAL ONCE
Qty: 0 | Refills: 0 | Status: COMPLETED | OUTPATIENT
Start: 2018-09-08 | End: 2018-09-08

## 2018-09-08 RX ORDER — FUROSEMIDE 40 MG
20 TABLET ORAL ONCE
Qty: 0 | Refills: 0 | Status: COMPLETED | OUTPATIENT
Start: 2018-09-08 | End: 2018-09-08

## 2018-09-08 RX ORDER — FUROSEMIDE 40 MG
40 TABLET ORAL
Qty: 0 | Refills: 0 | Status: DISCONTINUED | OUTPATIENT
Start: 2018-09-08 | End: 2018-09-17

## 2018-09-08 RX ORDER — PHYTONADIONE (VIT K1) 5 MG
5 TABLET ORAL ONCE
Qty: 0 | Refills: 0 | Status: COMPLETED | OUTPATIENT
Start: 2018-09-08 | End: 2018-09-08

## 2018-09-08 RX ADMIN — Medication 25 MILLIGRAM(S): at 17:08

## 2018-09-08 RX ADMIN — Medication 20 MILLIGRAM(S): at 00:36

## 2018-09-08 RX ADMIN — Medication 12.5 MILLIGRAM(S): at 17:45

## 2018-09-08 RX ADMIN — Medication 25 MILLIGRAM(S): at 09:22

## 2018-09-08 RX ADMIN — Medication 100 MILLIGRAM(S): at 09:22

## 2018-09-08 RX ADMIN — Medication 1 MILLIGRAM(S): at 09:21

## 2018-09-08 RX ADMIN — Medication 40 MILLIGRAM(S): at 17:08

## 2018-09-08 RX ADMIN — Medication 25 MILLIGRAM(S): at 01:08

## 2018-09-08 RX ADMIN — Medication 5 MILLIGRAM(S): at 22:32

## 2018-09-08 RX ADMIN — Medication 40 MILLIEQUIVALENT(S): at 00:37

## 2018-09-08 RX ADMIN — CEFTRIAXONE 100 GRAM(S): 500 INJECTION, POWDER, FOR SOLUTION INTRAMUSCULAR; INTRAVENOUS at 17:10

## 2018-09-08 RX ADMIN — Medication 40 MILLIGRAM(S): at 06:50

## 2018-09-08 NOTE — PROVIDER CONTACT NOTE (OTHER) - ASSESSMENT
A&Ox4. /65. RR 16, 94% RA. No c/o of distress. Asymptomatic. Pt has h/o of being on beta blocker. Baseline HR has been 110-120s.

## 2018-09-08 NOTE — PROGRESS NOTE ADULT - SUBJECTIVE AND OBJECTIVE BOX
Drowsy but arousable  +ascites and LE edema    \Vital Signs Last 24 Hrs  T(C): 37 (08 Sep 2018 12:00), Max: 37.1 (08 Sep 2018 04:16)  T(F): 98.6 (08 Sep 2018 12:00), Max: 98.7 (08 Sep 2018 04:16)  HR: 125 (08 Sep 2018 12:00) (114 - 129)  BP: 117/72 (08 Sep 2018 12:00) (103/66 - 122/73)  BP(mean): --  RR: 16 (08 Sep 2018 12:00) (16 - 18)  SpO2: 95% (08 Sep 2018 12:00) (94% - 96%)    PHYSICAL EXAM:    Constitutional: NAD, well-developed  Neck: No LAD, supple  Respiratory: CTA and P  Cardiovascular: S1 and S2, RRR, no M  Gastrointestinal: BS+, distended ++ascites  Extremities: b/l LE edema and erythema  Vascular: 2+ peripheral pulses  Neurological: drowsy      MEDICATIONS  (STANDING):  cefTRIAXone   IVPB      cefTRIAXone   IVPB 1 Gram(s) IV Intermittent every 24 hours  chlordiazePOXIDE 25 milliGRAM(s) Oral every 8 hours  folic acid 1 milliGRAM(s) Oral daily  furosemide   Injectable 40 milliGRAM(s) IV Push two times a day  influenza   Vaccine 0.5 milliLiter(s) IntraMuscular once  thiamine 100 milliGRAM(s) Oral daily    MEDICATIONS  (PRN):  LORazepam   Injectable 2 milliGRAM(s) IntraMuscular every 4 hours PRN CIWA > 8      Allergies    No Known Allergies    Intolerances          LABS:                        10.5   11.58 )-----------( 136      ( 08 Sep 2018 08:18 )             29.6                         10.8   13.20 )-----------( 147      ( 06 Sep 2018 07:49 )             30.1                         12.2   15.3  )-----------( 154      ( 05 Sep 2018 16:09 )             36.0     09-08    125<L>  |  84<L>  |  9   ----------------------------<  99  3.7   |  26  |  0.76    Ca    8.9      08 Sep 2018 11:38    TPro  6.6  /  Alb  2.6<L>  /  TBili  6.9<H>  /  DBili  x   /  AST  174<H>  /  ALT  73<H>  /  AlkPhos  145<H>  09-08    PT/INR - ( 08 Sep 2018 08:18 )   PT: 24.3 sec;   INR: 2.12 ratio             LIVER FUNCTIONS - ( 08 Sep 2018 07:19 )  Alb: 2.6 g/dL / Pro: 6.6 g/dL / ALK PHOS: 145 U/L / ALT: 73 U/L / AST: 174 U/L / GGT: x         LIVER FUNCTIONS - ( 06 Sep 2018 06:15 )  Alb: 2.2 g/dL / Pro: 6.4 g/dL / ALK PHOS: 140 U/L / ALT: 76 U/L / AST: 181 U/L / GGT: x         LIVER FUNCTIONS - ( 05 Sep 2018 16:09 )  Alb: 2.5 g/dL / Pro: 7.2 g/dL / ALK PHOS: 153 U/L / ALT: 87 U/L / AST: 213 U/L / GGT: x               RADIOLOGY & ADDITIONAL TESTS:

## 2018-09-08 NOTE — PROGRESS NOTE ADULT - SUBJECTIVE AND OBJECTIVE BOX
Pt seen and examined 9/8    no shaking of exgtremities  no N/V  no acute pain    98.8  74    16    102/70   98Nc O2    GENERAL: NAD, AAOx3  HEAD:  Atraumatic, Normocephalic  EYES: EOMI, (+)icterus  NECK: Supple, No JVD, No LAD  CHEST/LUNG: Clear to auscultation bilaterally; No wheeze  HEART: Regular rate and rhythm; No murmurs, rubs, or gallops  ABDOMEN: Markedly distended without focal tenderness  EXTREMITIES:  2+ Peripheral Pulses, No clubbing, cyanosis, or edema  SKIN: Anasarca with beefy red skin rash that is warm to touch, confluent, non blanching, non purulent.  NEURO: nonfocal CN/motor/sensory/reflexes    Labs reviewed

## 2018-09-08 NOTE — PROGRESS NOTE ADULT - ASSESSMENT
Alcoholic Hepatitis  Decompensated Cirrhosis, presumed alcoholic, complicated by ascites, r/o SBP  Hyponatremia    Rec:  Discriminant function unreliable in the setting of xarelto use, but patient is not a candidate for steroids with probably LE cellulitis and possible SBP.  Correct coagulopathy and plan for diag paracentesis  Salt/fluid restriction.  Agree with empiric antibiotics  CIWA

## 2018-09-09 LAB
ALBUMIN SERPL ELPH-MCNC: 2.2 G/DL — LOW (ref 3.3–5)
ALP SERPL-CCNC: 128 U/L — HIGH (ref 40–120)
ALT FLD-CCNC: 66 U/L — HIGH (ref 10–45)
ANION GAP SERPL CALC-SCNC: 11 MMOL/L — SIGNIFICANT CHANGE UP (ref 5–17)
AST SERPL-CCNC: 162 U/L — HIGH (ref 10–40)
BILIRUB SERPL-MCNC: 5.5 MG/DL — HIGH (ref 0.2–1.2)
BUN SERPL-MCNC: 12 MG/DL — SIGNIFICANT CHANGE UP (ref 7–23)
CALCIUM SERPL-MCNC: 8.4 MG/DL — SIGNIFICANT CHANGE UP (ref 8.4–10.5)
CHLORIDE SERPL-SCNC: 85 MMOL/L — LOW (ref 96–108)
CO2 SERPL-SCNC: 28 MMOL/L — SIGNIFICANT CHANGE UP (ref 22–31)
CREAT SERPL-MCNC: 0.91 MG/DL — SIGNIFICANT CHANGE UP (ref 0.5–1.3)
GLUCOSE SERPL-MCNC: 94 MG/DL — SIGNIFICANT CHANGE UP (ref 70–99)
HCT VFR BLD CALC: 27.2 % — LOW (ref 39–50)
HGB BLD-MCNC: 9.5 G/DL — LOW (ref 13–17)
INR BLD: 1.84 RATIO — HIGH (ref 0.88–1.16)
MCHC RBC-ENTMCNC: 34.9 GM/DL — SIGNIFICANT CHANGE UP (ref 32–36)
MCHC RBC-ENTMCNC: 36.8 PG — HIGH (ref 27–34)
MCV RBC AUTO: 105.4 FL — HIGH (ref 80–100)
MITOCHONDRIA AB SER-ACNC: SIGNIFICANT CHANGE UP
PLATELET # BLD AUTO: 121 K/UL — LOW (ref 150–400)
POTASSIUM SERPL-MCNC: 3.6 MMOL/L — SIGNIFICANT CHANGE UP (ref 3.5–5.3)
POTASSIUM SERPL-SCNC: 3.6 MMOL/L — SIGNIFICANT CHANGE UP (ref 3.5–5.3)
PROT SERPL-MCNC: 5.9 G/DL — LOW (ref 6–8.3)
PROTHROM AB SERPL-ACNC: 21 SEC — HIGH (ref 10–13.1)
RBC # BLD: 2.58 M/UL — LOW (ref 4.2–5.8)
RBC # FLD: 14.9 % — HIGH (ref 10.3–14.5)
SMOOTH MUSCLE AB SER-ACNC: ABNORMAL
SODIUM SERPL-SCNC: 124 MMOL/L — LOW (ref 135–145)
WBC # BLD: 10.95 K/UL — HIGH (ref 3.8–10.5)
WBC # FLD AUTO: 10.95 K/UL — HIGH (ref 3.8–10.5)

## 2018-09-09 PROCEDURE — 72100 X-RAY EXAM L-S SPINE 2/3 VWS: CPT | Mod: 26

## 2018-09-09 RX ORDER — VANCOMYCIN HCL 1 G
1000 VIAL (EA) INTRAVENOUS EVERY 12 HOURS
Qty: 0 | Refills: 0 | Status: DISCONTINUED | OUTPATIENT
Start: 2018-09-09 | End: 2018-09-10

## 2018-09-09 RX ADMIN — Medication 1 MILLIGRAM(S): at 10:21

## 2018-09-09 RX ADMIN — Medication 0.5 MILLIGRAM(S): at 13:37

## 2018-09-09 RX ADMIN — Medication 25 MILLIGRAM(S): at 01:36

## 2018-09-09 RX ADMIN — CEFTRIAXONE 100 GRAM(S): 500 INJECTION, POWDER, FOR SOLUTION INTRAMUSCULAR; INTRAVENOUS at 17:23

## 2018-09-09 RX ADMIN — Medication 40 MILLIGRAM(S): at 05:23

## 2018-09-09 RX ADMIN — Medication 250 MILLIGRAM(S): at 17:23

## 2018-09-09 RX ADMIN — Medication 25 MILLIGRAM(S): at 10:21

## 2018-09-09 RX ADMIN — Medication 25 MILLIGRAM(S): at 17:22

## 2018-09-09 RX ADMIN — Medication 100 MILLIGRAM(S): at 10:21

## 2018-09-09 RX ADMIN — Medication 40 MILLIGRAM(S): at 17:22

## 2018-09-09 NOTE — PROGRESS NOTE ADULT - SUBJECTIVE AND OBJECTIVE BOX
Slightly more awake now  No abd pain  + LE edema and erythema b/l     Vital Signs Last 24 Hrs  T(C): 36.7 (09 Sep 2018 12:00), Max: 37.1 (09 Sep 2018 00:01)  T(F): 98 (09 Sep 2018 12:00), Max: 98.8 (09 Sep 2018 00:01)  HR: 113 (09 Sep 2018 12:00) (102 - 126)  BP: 109/71 (09 Sep 2018 12:00) (94/59 - 121/65)  BP(mean): --  RR: 18 (09 Sep 2018 12:00) (16 - 18)  SpO2: 95% (09 Sep 2018 12:00) (92% - 96%)    PHYSICAL EXAM:    Constitutional: NAD, well-developed  Neck: No LAD, supple  Respiratory: CTA and P  Cardiovascular: S1 and S2, RRR, no M  Gastrointestinal: BS+, soft, ++ ascites  Extremities: B/L LE edema and erythema      MEDICATIONS  (STANDING):  cefTRIAXone   IVPB      cefTRIAXone   IVPB 1 Gram(s) IV Intermittent every 24 hours  chlordiazePOXIDE 25 milliGRAM(s) Oral every 8 hours  folic acid 1 milliGRAM(s) Oral daily  furosemide   Injectable 40 milliGRAM(s) IV Push two times a day  influenza   Vaccine 0.5 milliLiter(s) IntraMuscular once  thiamine 100 milliGRAM(s) Oral daily    MEDICATIONS  (PRN):  LORazepam   Injectable 2 milliGRAM(s) IntraMuscular every 4 hours PRN CIWA > 8      Allergies    No Known Allergies    Intolerances          LABS:                        9.5    10.95 )-----------( 121      ( 09 Sep 2018 08:33 )             27.2                         10.5   11.58 )-----------( 136      ( 08 Sep 2018 08:18 )             29.6     09-09    124<L>  |  85<L>  |  12  ----------------------------<  94  3.6   |  28  |  0.91    Ca    8.4      09 Sep 2018 06:32    TPro  5.9<L>  /  Alb  2.2<L>  /  TBili  5.5<H>  /  DBili  x   /  AST  162<H>  /  ALT  66<H>  /  AlkPhos  128<H>  09-09    PT/INR - ( 09 Sep 2018 08:30 )   PT: 21.0 sec;   INR: 1.84 ratio             LIVER FUNCTIONS - ( 09 Sep 2018 06:32 )  Alb: 2.2 g/dL / Pro: 5.9 g/dL / ALK PHOS: 128 U/L / ALT: 66 U/L / AST: 162 U/L / GGT: x         LIVER FUNCTIONS - ( 08 Sep 2018 07:19 )  Alb: 2.6 g/dL / Pro: 6.6 g/dL / ALK PHOS: 145 U/L / ALT: 73 U/L / AST: 174 U/L / GGT: x               RADIOLOGY & ADDITIONAL TESTS:

## 2018-09-09 NOTE — PROGRESS NOTE ADULT - ASSESSMENT
Alcoholic Hepatitis  Decompensated Cirrhosis, presumed alcoholic, complicated by ascites, r/o SBP  Hyponatremia    Rec:  Discriminant function unreliable in the setting of xarelto use, but patient is not a candidate for steroids with probably LE cellulitis and possible SBP.  Correct coagulopathy and plan for diag paracentesis  Salt/fluid restriction.  ID evaluation as LE cellulitis is not improving w/ current abx  CIWA

## 2018-09-09 NOTE — PROGRESS NOTE ADULT - SUBJECTIVE AND OBJECTIVE BOX
So far mental status stable  voiding spontaneously but no strict I/Os available    afebrile VSS    GENERAL: NAD, AAOx3  HEAD:  Atraumatic, Normocephalic  EYES: EOMI, (+)icterus  NECK: Supple, No JVD, No LAD  CHEST/LUNG: Clear to auscultation bilaterally; No wheeze  HEART: Regular rate and rhythm; No murmurs, rubs, or gallops  ABDOMEN: Markedly distended without focal tenderness  EXTREMITIES:  2+ Peripheral Pulses, No clubbing, cyanosis, or edema  SKIN: Anasarca with beefy red skin rash that is warm to touch, confluent, non blanching, non purulent.  NEURO: nonfocal CN/motor/sensory/reflexes    labd reviewed

## 2018-09-09 NOTE — PROGRESS NOTE ADULT - SUBJECTIVE AND OBJECTIVE BOX
[facility]   [phone]  RENAL FOLLOW UP NOTE  --------------------------------------------------------------------------------  Chief Complaint:    24 hour events/subjective:    seen earlier. states urinating often   still with edema     PAST HISTORY  --------------------------------------------------------------------------------  No significant changes to PMH, PSH, FHx, SHx, unless otherwise noted    ALLERGIES & MEDICATIONS  --------------------------------------------------------------------------------  Allergies    No Known Allergies    Intolerances      Standing Inpatient Medications  cefTRIAXone   IVPB      cefTRIAXone   IVPB 1 Gram(s) IV Intermittent every 24 hours  chlordiazePOXIDE 25 milliGRAM(s) Oral every 8 hours  folic acid 1 milliGRAM(s) Oral daily  furosemide   Injectable 40 milliGRAM(s) IV Push two times a day  influenza   Vaccine 0.5 milliLiter(s) IntraMuscular once  thiamine 100 milliGRAM(s) Oral daily  vancomycin  IVPB 1000 milliGRAM(s) IV Intermittent every 12 hours    PRN Inpatient Medications  LORazepam   Injectable 2 milliGRAM(s) IntraMuscular every 4 hours PRN      REVIEW OF SYSTEMS  --------------------------------------------------------------------------------    Gen: isabel fatigue, fevers/chills,  CVS: denies chest pain/palpitations  Resp: denies SOB/Cough  GI: Denies N/V/Abd pain  : Denies dysuria/oliguria/hematuria    All other systems were reviewed and are negative, except as noted.    VITALS/PHYSICAL EXAM  --------------------------------------------------------------------------------  T(C): 36.6 (09-09-18 @ 20:10), Max: 37.1 (09-09-18 @ 00:01)  HR: 119 (09-09-18 @ 20:10) (102 - 119)  BP: 109/59 (09-09-18 @ 20:10) (94/59 - 109/71)  RR: 18 (09-09-18 @ 20:10) (18 - 18)  SpO2: 93% (09-09-18 @ 20:10) (92% - 96%)  Wt(kg): --        09-08-18 @ 07:01  -  09-09-18 @ 07:00  --------------------------------------------------------  IN: 770 mL / OUT: 2300 mL / NET: -1530 mL    09-09-18 @ 07:01  -  09-09-18 @ 20:58  --------------------------------------------------------  IN: 480 mL / OUT: 1100 mL / NET: -620 mL      Physical Exam:  	  	  Gen:  comfortable appearing   	no jvd , supple neck,   	Pulm: decrease bs  no rales or ronchi or wheezing  	CV: RRR, S1S2; no rub  	Abd: +BS, soft, + ascites   	: No suprapubic tenderness  	UE: Warm, no cyanosis  no clubbing,  no edema; no asterixis  	LE: Warm, no cyanosis  no clubbing, 3+ edema  	Neuro: alert and oriented. speech coherent     LABS/STUDIES	  --------------------------------------------------------------------------------              9.5    10.95 >-----------<  121      [09-09-18 @ 08:33]              27.2     124  |  85  |  12  ----------------------------<  94      [09-09-18 @ 06:32]  3.6   |  28  |  0.91        Ca     8.4     [09-09-18 @ 06:32]    TPro  5.9  /  Alb  2.2  /  TBili  5.5  /  DBili  x   /  AST  162  /  ALT  66  /  AlkPhos  128  [09-09-18 @ 06:32]    PT/INR: PT 21.0 , INR 1.84       [09-09-18 @ 08:30]      Creatinine Trend:  SCr 0.91 [09-09 @ 06:32]  SCr 0.84 [09-08 @ 15:33]  SCr 0.76 [09-08 @ 11:38]  SCr 0.73 [09-08 @ 07:19]  SCr 0.78 [09-08 @ 00:39]              Urinalysis - [09-06-18 @ 01:50]      Color Brown / Appearance SL Turbid / SG 1.018 / pH 6.0      Gluc Negative / Ketone Negative  / Bili Moderate / Urobili 8       Blood Negative / Protein Trace / Leuk Est Negative / Nitrite Negative      RBC 0-2 / WBC 3-5 / Hyaline 0-2 / Gran  / Sq Epi  / Non Sq Epi OCC / Bacteria Few    Urine Sodium <20      [09-06-18 @ 11:26]  Urine Osmolality 422      [09-06-18 @ 04:57]    Iron 67, TIBC 94, %sat 71      [09-08-18 @ 10:01]  Ferritin 1886      [09-08-18 @ 09:14]

## 2018-09-09 NOTE — PROGRESS NOTE ADULT - ASSESSMENT
52M pmhx of Afib on xarelto, alcoholic hepatitis with cirrhosis and ascities with severe hyponatremia and fluid overloaded    1- hyponatremia  2- ascites  3- alcoholism     na still low   lasix to 40 mg iv bid   weight has not improved significantly   one liter fluid restriction   CIWA protocol   thiamine

## 2018-09-10 ENCOUNTER — RESULT REVIEW (OUTPATIENT)
Age: 53
End: 2018-09-10

## 2018-09-10 LAB
ALBUMIN FLD-MCNC: 0.4 G/DL — SIGNIFICANT CHANGE UP
ANION GAP SERPL CALC-SCNC: 16 MMOL/L — SIGNIFICANT CHANGE UP (ref 5–17)
B PERT IGG+IGM PNL SER: ABNORMAL
BUN SERPL-MCNC: 14 MG/DL — SIGNIFICANT CHANGE UP (ref 7–23)
CALCIUM SERPL-MCNC: 8.5 MG/DL — SIGNIFICANT CHANGE UP (ref 8.4–10.5)
CHLORIDE SERPL-SCNC: 85 MMOL/L — LOW (ref 96–108)
CO2 SERPL-SCNC: 26 MMOL/L — SIGNIFICANT CHANGE UP (ref 22–31)
COLOR FLD: YELLOW — SIGNIFICANT CHANGE UP
CREAT SERPL-MCNC: 0.83 MG/DL — SIGNIFICANT CHANGE UP (ref 0.5–1.3)
FLUID INTAKE SUBSTANCE CLASS: SIGNIFICANT CHANGE UP
FLUID SEGMENTED GRANULOCYTES: 5 % — SIGNIFICANT CHANGE UP
GLUCOSE FLD-MCNC: 127 MG/DL — SIGNIFICANT CHANGE UP
GLUCOSE SERPL-MCNC: 108 MG/DL — HIGH (ref 70–99)
GRAM STN FLD: SIGNIFICANT CHANGE UP
HCT VFR BLD CALC: 32.1 % — LOW (ref 39–50)
HGB BLD-MCNC: 10.8 G/DL — LOW (ref 13–17)
INR BLD: 1.6 RATIO — HIGH (ref 0.88–1.16)
LDH SERPL L TO P-CCNC: 48 U/L — SIGNIFICANT CHANGE UP
LYMPHOCYTES # FLD: 45 % — SIGNIFICANT CHANGE UP
MCHC RBC-ENTMCNC: 33.6 GM/DL — SIGNIFICANT CHANGE UP (ref 32–36)
MCHC RBC-ENTMCNC: 35.2 PG — HIGH (ref 27–34)
MCV RBC AUTO: 104.6 FL — HIGH (ref 80–100)
MESOTHL CELL # FLD: 9 % — SIGNIFICANT CHANGE UP
MONOS+MACROS # FLD: 39 % — SIGNIFICANT CHANGE UP
MRSA PCR RESULT.: SIGNIFICANT CHANGE UP
OTHER CELLS FLD MANUAL: 2 % — SIGNIFICANT CHANGE UP
PLATELET # BLD AUTO: 135 K/UL — LOW (ref 150–400)
POTASSIUM SERPL-MCNC: 3.1 MMOL/L — LOW (ref 3.5–5.3)
POTASSIUM SERPL-SCNC: 3.1 MMOL/L — LOW (ref 3.5–5.3)
PROT FLD-MCNC: 1.1 G/DL — SIGNIFICANT CHANGE UP
PROTHROM AB SERPL-ACNC: 18.3 SEC — HIGH (ref 10–13.1)
RBC # BLD: 3.07 M/UL — LOW (ref 4.2–5.8)
RBC # FLD: 15.2 % — HIGH (ref 10.3–14.5)
RCV VOL RI: 440 /UL — HIGH (ref 0–5)
S AUREUS DNA NOSE QL NAA+PROBE: DETECTED
SODIUM SERPL-SCNC: 127 MMOL/L — LOW (ref 135–145)
SPECIMEN SOURCE: SIGNIFICANT CHANGE UP
TOTAL NUCLEATED CELL COUNT, BODY FLUID: 108 /UL — HIGH (ref 0–5)
TUBE TYPE: SIGNIFICANT CHANGE UP
WBC # BLD: 14.62 K/UL — HIGH (ref 3.8–10.5)
WBC # FLD AUTO: 14.62 K/UL — HIGH (ref 3.8–10.5)

## 2018-09-10 PROCEDURE — 88108 CYTOPATH CONCENTRATE TECH: CPT | Mod: 26,59

## 2018-09-10 PROCEDURE — 49083 ABD PARACENTESIS W/IMAGING: CPT

## 2018-09-10 PROCEDURE — 88112 CYTOPATH CELL ENHANCE TECH: CPT | Mod: 26

## 2018-09-10 PROCEDURE — 88305 TISSUE EXAM BY PATHOLOGIST: CPT | Mod: 26

## 2018-09-10 PROCEDURE — 76705 ECHO EXAM OF ABDOMEN: CPT | Mod: 26

## 2018-09-10 RX ORDER — AMPICILLIN SODIUM AND SULBACTAM SODIUM 250; 125 MG/ML; MG/ML
1.5 INJECTION, POWDER, FOR SUSPENSION INTRAMUSCULAR; INTRAVENOUS EVERY 6 HOURS
Qty: 0 | Refills: 0 | Status: COMPLETED | OUTPATIENT
Start: 2018-09-10 | End: 2018-09-15

## 2018-09-10 RX ORDER — POTASSIUM CHLORIDE 20 MEQ
40 PACKET (EA) ORAL EVERY 4 HOURS
Qty: 0 | Refills: 0 | Status: COMPLETED | OUTPATIENT
Start: 2018-09-10 | End: 2018-09-10

## 2018-09-10 RX ADMIN — Medication 100 MILLIGRAM(S): at 09:23

## 2018-09-10 RX ADMIN — Medication 40 MILLIGRAM(S): at 19:11

## 2018-09-10 RX ADMIN — Medication 40 MILLIEQUIVALENT(S): at 13:57

## 2018-09-10 RX ADMIN — AMPICILLIN SODIUM AND SULBACTAM SODIUM 100 GRAM(S): 250; 125 INJECTION, POWDER, FOR SUSPENSION INTRAMUSCULAR; INTRAVENOUS at 23:33

## 2018-09-10 RX ADMIN — Medication 40 MILLIEQUIVALENT(S): at 09:23

## 2018-09-10 RX ADMIN — Medication 250 MILLIGRAM(S): at 05:21

## 2018-09-10 RX ADMIN — Medication 40 MILLIGRAM(S): at 05:20

## 2018-09-10 RX ADMIN — Medication 1 MILLIGRAM(S): at 09:23

## 2018-09-10 RX ADMIN — AMPICILLIN SODIUM AND SULBACTAM SODIUM 100 GRAM(S): 250; 125 INJECTION, POWDER, FOR SUSPENSION INTRAMUSCULAR; INTRAVENOUS at 19:11

## 2018-09-10 RX ADMIN — Medication 25 MILLIGRAM(S): at 09:23

## 2018-09-10 RX ADMIN — AMPICILLIN SODIUM AND SULBACTAM SODIUM 100 GRAM(S): 250; 125 INJECTION, POWDER, FOR SUSPENSION INTRAMUSCULAR; INTRAVENOUS at 13:56

## 2018-09-10 RX ADMIN — Medication 25 MILLIGRAM(S): at 01:14

## 2018-09-10 RX ADMIN — Medication 25 MILLIGRAM(S): at 19:11

## 2018-09-10 NOTE — PROGRESS NOTE ADULT - SUBJECTIVE AND OBJECTIVE BOX
Interventional Radiology     52y Male with PMH as below with h/o alcoholic liver cirrhosis with new onset ascites referred to IR for paracentesis.      PAST MEDICAL & SURGICAL HISTORY:  Atrial fibrillation  Liver disease due to alcohol  Dupuytren contracture    Allergies  No Known Allergies    Labs:                        10.8   14.62 )-----------( 135      ( 10 Sep 2018 09:29 )             32.1     09-10    127<L>  |  85<L>  |  14  ----------------------------<  108<H>  3.1<L>   |  26  |  0.83    Ca    8.5      10 Sep 2018 07:21    TPro  5.9<L>  /  Alb  2.2<L>  /  TBili  5.5<H>  /  DBili  x   /  AST  162<H>  /  ALT  66<H>  /  AlkPhos  128<H>  09-09    PT/INR - ( 10 Sep 2018 09:24 )   PT: 18.3 sec;   INR: 1.60 ratio    Activated Partial Thromboplastin Time in AM (09.06.18 @ 07:49)    Activated Partial Thromboplastin Time: 42.2:    After risks, benefits, alternatives discussion pt verbalized understanding and signed informed consent.  Will plan for image-guided paracentesis (therapeutic and diagnostic)     PARUL Long  [facility] [id number]  Ext [phone]

## 2018-09-10 NOTE — PROCEDURE NOTE - GENERAL PROCEDURE DETAILS
Drained 5800 ml of clear yellow ascites via RLQ abdomen, pt tolerated procedure well. hemostasis secure and VSS. Dressing applied to RLQ abdomen is C/D/I.

## 2018-09-10 NOTE — PROGRESS NOTE ADULT - ASSESSMENT
52M pmhx of Afib on xarelto, alcoholic hepatitis with cirrhosis and ascities with severe hyponatremia and fluid overloaded    1- hyponatremia  2- ascites  3- alcoholism     na still low   lasix  40 mg iv bid weight improving if weight cont to improve then cont this dose otherwise in am will increase diuretics and add aldactone as well   hypokalemia replete kcl    one liter fluid restriction   CIWA protocol   thiamine

## 2018-09-10 NOTE — PROGRESS NOTE ADULT - ASSESSMENT
Alcoholic Hepatitis  Decompensated Cirrhosis, presumed alcoholic, complicated by ascites, r/o SBP  Hyponatremia    Rec:  Discriminant function unreliable in the setting of xarelto use, but patient is not a candidate for steroids with probably LE cellulitis and possible SBP.  Awaiting diag paracentesis  Salt/fluid restriction.  Vanco added to abx regimen for LE cellulits  BUNNY

## 2018-09-10 NOTE — PROGRESS NOTE ADULT - SUBJECTIVE AND OBJECTIVE BOX
[facility]   [phone]  RENAL FOLLOW UP NOTE  --------------------------------------------------------------------------------  Chief Complaint:    24 hour events/subjective:    seen earlier with Dr. Mohr   pt s/p librium resting     PAST HISTORY  --------------------------------------------------------------------------------  No significant changes to PMH, PSH, FHx, SHx, unless otherwise noted    ALLERGIES & MEDICATIONS  --------------------------------------------------------------------------------  Allergies    No Known Allergies    Intolerances      Standing Inpatient Medications  ampicillin/sulbactam  IVPB 1.5 Gram(s) IV Intermittent every 6 hours  chlordiazePOXIDE 25 milliGRAM(s) Oral every 8 hours  folic acid 1 milliGRAM(s) Oral daily  furosemide   Injectable 40 milliGRAM(s) IV Push two times a day  influenza   Vaccine 0.5 milliLiter(s) IntraMuscular once  thiamine 100 milliGRAM(s) Oral daily    PRN Inpatient Medications  LORazepam   Injectable 2 milliGRAM(s) IntraMuscular every 4 hours PRN      REVIEW OF SYSTEMS  --------------------------------------------------------------------------------  not answering ROS  VITALS/PHYSICAL EXAM  --------------------------------------------------------------------------------  T(C): 37.1 (09-10-18 @ 19:32), Max: 37.1 (09-10-18 @ 19:32)  HR: 118 (09-10-18 @ 19:32) (109 - 122)  BP: 117/68 (09-10-18 @ 19:32) (105/67 - 117/68)  RR: 18 (09-10-18 @ 19:32) (18 - 18)  SpO2: 92% (09-10-18 @ 19:32) (92% - 94%)  Wt(kg): --        09-09-18 @ 07:01  -  09-10-18 @ 07:00  --------------------------------------------------------  IN: 1170 mL / OUT: 1900 mL / NET: -730 mL    09-10-18 @ 07:01  -  09-10-18 @ 20:58  --------------------------------------------------------  IN: 480 mL / OUT: 400 mL / NET: 80 mL      Physical Exam:  	  Gen:  comfortable appearing   	no jvd , supple neck,   	Pulm: decrease bs  no rales or ronchi or wheezing  	CV: RRR, S1S2; no rub  	Abd: +BS, soft, + ascites   	: No suprapubic tenderness  	UE: Warm, no cyanosis  no clubbing,  no edema; no asterixis  	LE: Warm, no cyanosis  no clubbing, 3+ edema  	  LABS/STUDIES  --------------------------------------------------------------------------------              10.8   14.62 >-----------<  135      [09-10-18 @ 09:29]              32.1     127  |  85  |  14  ----------------------------<  108      [09-10-18 @ 07:21]  3.1   |  26  |  0.83        Ca     8.5     [09-10-18 @ 07:21]    TPro  5.9  /  Alb  2.2  /  TBili  5.5  /  DBili  x   /  AST  162  /  ALT  66  /  AlkPhos  128  [09-09-18 @ 06:32]    PT/INR: PT 18.3 , INR 1.60       [09-10-18 @ 09:24]      Creatinine Trend:  SCr 0.83 [09-10 @ 07:21]  SCr 0.91 [09-09 @ 06:32]  SCr 0.84 [09-08 @ 15:33]  SCr 0.76 [09-08 @ 11:38]  SCr 0.73 [09-08 @ 07:19]              Urinalysis - [09-06-18 @ 01:50]      Color Brown / Appearance SL Turbid / SG 1.018 / pH 6.0      Gluc Negative / Ketone Negative  / Bili Moderate / Urobili 8       Blood Negative / Protein Trace / Leuk Est Negative / Nitrite Negative      RBC 0-2 / WBC 3-5 / Hyaline 0-2 / Gran  / Sq Epi  / Non Sq Epi OCC / Bacteria Few    Urine Sodium <20      [09-06-18 @ 11:26]  Urine Osmolality 422      [09-06-18 @ 04:57]    Iron 67, TIBC 94, %sat 71      [09-08-18 @ 10:01]  Ferritin 1886      [09-08-18 @ 09:14]

## 2018-09-10 NOTE — CONSULT NOTE ADULT - PROBLEM SELECTOR RECOMMENDATION 9
blood cx negative  b/l cellulitis is uncommon  some of the erythema is likely secondary to dermatitis and changes from persistent edema  legs are nonpurulent no concern for mrsa  will check screen  wbc persists  but without fever and no change in clinical status.  will do unasyn for a short course  more importantly needs to keep elevated while in bed and aggressive diuresis

## 2018-09-10 NOTE — PROGRESS NOTE ADULT - SUBJECTIVE AND OBJECTIVE BOX
Less lethargic  B/L LE edema and erythema    Vital Signs Last 24 Hrs  T(C): 36.9 (10 Sep 2018 12:19), Max: 37 (10 Sep 2018 00:06)  T(F): 98.4 (10 Sep 2018 12:19), Max: 98.6 (10 Sep 2018 00:06)  HR: 116 (10 Sep 2018 12:19) (109 - 122)  BP: 110/64 (10 Sep 2018 12:19) (105/67 - 117/66)  BP(mean): --  RR: 18 (10 Sep 2018 12:19) (18 - 18)  SpO2: 94% (10 Sep 2018 12:19) (92% - 94%)    PHYSICAL EXAM:    Constitutional: NAD, well-developed  Neck: No LAD, supple  Respiratory: CTA and P  Cardiovascular: S1 and S2, RRR, no M  Gastrointestinal: BS+, soft,++ascites  Extremities: ++ b/l edema and erythema  Vascular: 2+ peripheral pulses      MEDICATIONS  (STANDING):  ampicillin/sulbactam  IVPB 1.5 Gram(s) IV Intermittent every 6 hours  chlordiazePOXIDE 25 milliGRAM(s) Oral every 8 hours  folic acid 1 milliGRAM(s) Oral daily  furosemide   Injectable 40 milliGRAM(s) IV Push two times a day  influenza   Vaccine 0.5 milliLiter(s) IntraMuscular once  thiamine 100 milliGRAM(s) Oral daily    MEDICATIONS  (PRN):  LORazepam   Injectable 2 milliGRAM(s) IntraMuscular every 4 hours PRN CIWA > 8      Allergies    No Known Allergies    Intolerances          LABS:                        10.8   14.62 )-----------( 135      ( 10 Sep 2018 09:29 )             32.1                         9.5    10.95 )-----------( 121      ( 09 Sep 2018 08:33 )             27.2                         10.5   11.58 )-----------( 136      ( 08 Sep 2018 08:18 )             29.6     09-10    127<L>  |  85<L>  |  14  ----------------------------<  108<H>  3.1<L>   |  26  |  0.83    Ca    8.5      10 Sep 2018 07:21    TPro  5.9<L>  /  Alb  2.2<L>  /  TBili  5.5<H>  /  DBili  x   /  AST  162<H>  /  ALT  66<H>  /  AlkPhos  128<H>  09-09    PT/INR - ( 10 Sep 2018 09:24 )   PT: 18.3 sec;   INR: 1.60 ratio             LIVER FUNCTIONS - ( 09 Sep 2018 06:32 )  Alb: 2.2 g/dL / Pro: 5.9 g/dL / ALK PHOS: 128 U/L / ALT: 66 U/L / AST: 162 U/L / GGT: x         LIVER FUNCTIONS - ( 08 Sep 2018 07:19 )  Alb: 2.6 g/dL / Pro: 6.6 g/dL / ALK PHOS: 145 U/L / ALT: 73 U/L / AST: 174 U/L / GGT: x               RADIOLOGY & ADDITIONAL TESTS:

## 2018-09-11 DIAGNOSIS — K65.2 SPONTANEOUS BACTERIAL PERITONITIS: ICD-10-CM

## 2018-09-11 DIAGNOSIS — L53.9 ERYTHEMATOUS CONDITION, UNSPECIFIED: ICD-10-CM

## 2018-09-11 LAB
ANION GAP SERPL CALC-SCNC: 11 MMOL/L — SIGNIFICANT CHANGE UP (ref 5–17)
BUN SERPL-MCNC: 14 MG/DL — SIGNIFICANT CHANGE UP (ref 7–23)
CALCIUM SERPL-MCNC: 8.8 MG/DL — SIGNIFICANT CHANGE UP (ref 8.4–10.5)
CHLORIDE SERPL-SCNC: 87 MMOL/L — LOW (ref 96–108)
CO2 SERPL-SCNC: 27 MMOL/L — SIGNIFICANT CHANGE UP (ref 22–31)
CREAT SERPL-MCNC: 0.91 MG/DL — SIGNIFICANT CHANGE UP (ref 0.5–1.3)
CULTURE RESULTS: SIGNIFICANT CHANGE UP
CULTURE RESULTS: SIGNIFICANT CHANGE UP
GLUCOSE SERPL-MCNC: 106 MG/DL — HIGH (ref 70–99)
HCT VFR BLD CALC: 30 % — LOW (ref 39–50)
HGB BLD-MCNC: 10.3 G/DL — LOW (ref 13–17)
INR BLD: 1.59 RATIO — HIGH (ref 0.88–1.16)
MCHC RBC-ENTMCNC: 34.3 GM/DL — SIGNIFICANT CHANGE UP (ref 32–36)
MCHC RBC-ENTMCNC: 35.5 PG — HIGH (ref 27–34)
MCV RBC AUTO: 103.4 FL — HIGH (ref 80–100)
NIGHT BLUE STAIN TISS: SIGNIFICANT CHANGE UP
PLATELET # BLD AUTO: 120 K/UL — LOW (ref 150–400)
POTASSIUM SERPL-MCNC: 3 MMOL/L — LOW (ref 3.5–5.3)
POTASSIUM SERPL-SCNC: 3 MMOL/L — LOW (ref 3.5–5.3)
PROTHROM AB SERPL-ACNC: 18.1 SEC — HIGH (ref 10–13.1)
RBC # BLD: 2.9 M/UL — LOW (ref 4.2–5.8)
RBC # FLD: 15.2 % — HIGH (ref 10.3–14.5)
SODIUM SERPL-SCNC: 125 MMOL/L — LOW (ref 135–145)
SPECIMEN SOURCE: SIGNIFICANT CHANGE UP
WBC # BLD: 11.83 K/UL — HIGH (ref 3.8–10.5)
WBC # FLD AUTO: 11.83 K/UL — HIGH (ref 3.8–10.5)

## 2018-09-11 RX ORDER — POTASSIUM CHLORIDE 20 MEQ
40 PACKET (EA) ORAL EVERY 4 HOURS
Qty: 0 | Refills: 0 | Status: COMPLETED | OUTPATIENT
Start: 2018-09-11 | End: 2018-09-11

## 2018-09-11 RX ORDER — RIVAROXABAN 15 MG-20MG
20 KIT ORAL EVERY 24 HOURS
Qty: 0 | Refills: 0 | Status: DISCONTINUED | OUTPATIENT
Start: 2018-09-11 | End: 2018-09-12

## 2018-09-11 RX ADMIN — Medication 40 MILLIEQUIVALENT(S): at 15:53

## 2018-09-11 RX ADMIN — Medication 25 MILLIGRAM(S): at 02:02

## 2018-09-11 RX ADMIN — Medication 40 MILLIEQUIVALENT(S): at 09:23

## 2018-09-11 RX ADMIN — AMPICILLIN SODIUM AND SULBACTAM SODIUM 100 GRAM(S): 250; 125 INJECTION, POWDER, FOR SUSPENSION INTRAMUSCULAR; INTRAVENOUS at 05:03

## 2018-09-11 RX ADMIN — AMPICILLIN SODIUM AND SULBACTAM SODIUM 100 GRAM(S): 250; 125 INJECTION, POWDER, FOR SUSPENSION INTRAMUSCULAR; INTRAVENOUS at 11:32

## 2018-09-11 RX ADMIN — Medication 25 MILLIGRAM(S): at 20:54

## 2018-09-11 RX ADMIN — Medication 0.5 MILLIGRAM(S): at 19:02

## 2018-09-11 RX ADMIN — RIVAROXABAN 20 MILLIGRAM(S): KIT at 16:53

## 2018-09-11 RX ADMIN — Medication 100 MILLIGRAM(S): at 11:32

## 2018-09-11 RX ADMIN — AMPICILLIN SODIUM AND SULBACTAM SODIUM 100 GRAM(S): 250; 125 INJECTION, POWDER, FOR SUSPENSION INTRAMUSCULAR; INTRAVENOUS at 23:18

## 2018-09-11 RX ADMIN — Medication 25 MILLIGRAM(S): at 09:23

## 2018-09-11 RX ADMIN — Medication 40 MILLIGRAM(S): at 05:04

## 2018-09-11 RX ADMIN — Medication 1 MILLIGRAM(S): at 11:32

## 2018-09-11 RX ADMIN — Medication 40 MILLIGRAM(S): at 16:53

## 2018-09-11 RX ADMIN — AMPICILLIN SODIUM AND SULBACTAM SODIUM 100 GRAM(S): 250; 125 INJECTION, POWDER, FOR SUSPENSION INTRAMUSCULAR; INTRAVENOUS at 16:53

## 2018-09-11 NOTE — PROGRESS NOTE ADULT - ASSESSMENT
Alcoholic Hepatitis  Decompensated Cirrhosis, presumed alcoholic, complicated by ascites, r/o SBP  Hyponatremia    Rec:  Dx paracentesis (on abx) without elevated cell count.  It is reasonable to do long term SBP PPx on patient (Kenia B/C with ascitic protein 1.1, hyponatremia, hyperbilirubinemia)  - would d/w ID.  Salt/fluid restriction.  Vanco added to abx regimen for LE cellulits  CIWA

## 2018-09-11 NOTE — PROGRESS NOTE ADULT - PROBLEM SELECTOR PLAN 4
Hypervolemic hyponatremia likely 2/2 ascites  Appreciate renal  cont IV lasix 40 mg bid  Fluid restriction 1L  Strict I/Os  Daily weights

## 2018-09-11 NOTE — PROGRESS NOTE ADULT - SUBJECTIVE AND OBJECTIVE BOX
[facility]   [phone]  RENAL FOLLOW UP NOTE  --------------------------------------------------------------------------------  Chief Complaint:    24 hour events/subjective:    seen earlier. more responsive. significant other at bedside     PAST HISTORY  --------------------------------------------------------------------------------  No significant changes to PMH, PSH, FHx, SHx, unless otherwise noted    ALLERGIES & MEDICATIONS  --------------------------------------------------------------------------------  Allergies    No Known Allergies    Intolerances      Standing Inpatient Medications  ampicillin/sulbactam  IVPB 1.5 Gram(s) IV Intermittent every 6 hours  chlordiazePOXIDE 25 milliGRAM(s) Oral every 12 hours  folic acid 1 milliGRAM(s) Oral daily  furosemide   Injectable 40 milliGRAM(s) IV Push two times a day  influenza   Vaccine 0.5 milliLiter(s) IntraMuscular once  rivaroxaban 20 milliGRAM(s) Oral every 24 hours  thiamine 100 milliGRAM(s) Oral daily    PRN Inpatient Medications  LORazepam   Injectable 2 milliGRAM(s) IntraMuscular every 4 hours PRN      REVIEW OF SYSTEMS  --------------------------------------------------------------------------------    Gen: denies fevers/chills,  CVS: denies chest pain/palpitations  Resp: denies SOB/Cough  GI: Denies N/V/Abd pain  : Denies dysuria/oliguria/hematuria    All other systems were reviewed and are negative, except as noted.    VITALS/PHYSICAL EXAM  --------------------------------------------------------------------------------  T(C): 36.5 (09-11-18 @ 11:37), Max: 37 (09-11-18 @ 04:42)  HR: 116 (09-11-18 @ 16:48) (103 - 116)  BP: 116/69 (09-11-18 @ 16:48) (95/56 - 116/69)  RR: 18 (09-11-18 @ 11:37) (18 - 18)  SpO2: 96% (09-11-18 @ 11:37) (92% - 96%)  Wt(kg): --        09-10-18 @ 07:01  -  09-11-18 @ 07:00  --------------------------------------------------------  IN: 870 mL / OUT: 1100 mL / NET: -230 mL    09-11-18 @ 07:01  -  09-11-18 @ 21:50  --------------------------------------------------------  IN: 680 mL / OUT: 350 mL / NET: 330 mL      Physical Exam:  	  Gen:  comfortable appearing   	no jvd , supple neck,   	Pulm: decrease bs  no rales or ronchi or wheezing  	CV: RRR, S1S2; no rub  	Abd: +BS, soft, + ascites decreased   	: No suprapubic tenderness  	UE: Warm, no cyanosis  no clubbing,  no edema; no asterixis  	LE: Warm, no cyanosis  no clubbing, 2-3+ edema  	  LABS/STUDIES  --------------------------------------------------------------------------------              10.3   11.83 >-----------<  120      [09-11-18 @ 07:50]              30.0     125  |  87  |  14  ----------------------------<  106      [09-11-18 @ 06:09]  3.0   |  27  |  0.91        Ca     8.8     [09-11-18 @ 06:09]      PT/INR: PT 18.1 , INR 1.59       [09-11-18 @ 07:58]      Creatinine Trend:  SCr 0.91 [09-11 @ 06:09]  SCr 0.83 [09-10 @ 07:21]  SCr 0.91 [09-09 @ 06:32]  SCr 0.84 [09-08 @ 15:33]  SCr 0.76 [09-08 @ 11:38]              Urinalysis - [09-06-18 @ 01:50]      Color Brown / Appearance SL Turbid / SG 1.018 / pH 6.0      Gluc Negative / Ketone Negative  / Bili Moderate / Urobili 8       Blood Negative / Protein Trace / Leuk Est Negative / Nitrite Negative      RBC 0-2 / WBC 3-5 / Hyaline 0-2 / Gran  / Sq Epi  / Non Sq Epi OCC / Bacteria Few    Urine Sodium <20      [09-06-18 @ 11:26]  Urine Osmolality 422      [09-06-18 @ 04:57]    Iron 67, TIBC 94, %sat 71      [09-08-18 @ 10:01]  Ferritin 1886      [09-08-18 @ 09:14]

## 2018-09-11 NOTE — PROGRESS NOTE ADULT - ASSESSMENT
52M pmhx of Afib on xarelto, alcoholic hepatitis with cirrhosis and ascities with severe hyponatremia and fluid overloaded    1- hyponatremia  2- ascites  3- alcoholism     na still low   k low  lasix  40 mg iv bid weight improving  slowly. add aldactone 25 mg daily   hypokalemia replete kcl   check mag  one liter fluid restriction to cont   CIWA protocol   thiamine

## 2018-09-11 NOTE — PROGRESS NOTE ADULT - SUBJECTIVE AND OBJECTIVE BOX
Mild lethargy  Mild tremor  B/L LE edema and erythema  s/p 5.8L paracentesis c/w portal HTN    Vital Signs Last 24 Hrs  T(C): 37 (11 Sep 2018 04:42), Max: 37.1 (10 Sep 2018 19:32)  T(F): 98.6 (11 Sep 2018 04:42), Max: 98.7 (10 Sep 2018 19:32)  HR: 110 (11 Sep 2018 04:42) (103 - 118)  BP: 97/52 (11 Sep 2018 04:42) (95/56 - 117/68)  BP(mean): --  RR: 18 (11 Sep 2018 04:42) (18 - 18)  SpO2: 94% (11 Sep 2018 04:42) (92% - 94%)    PHYSICAL EXAM:    Constitutional: NAD, well-developed  Neck: No LAD, supple  Respiratory: CTA and P  Cardiovascular: S1 and S2, RRR, no M  Gastrointestinal: BS+, soft,++ascites  Extremities: ++ b/l edema and erythema  Vascular: 2+ peripheral pulses      MEDICATIONS  (STANDING):  ampicillin/sulbactam  IVPB 1.5 Gram(s) IV Intermittent every 6 hours  chlordiazePOXIDE 25 milliGRAM(s) Oral every 8 hours  folic acid 1 milliGRAM(s) Oral daily  furosemide   Injectable 40 milliGRAM(s) IV Push two times a day  influenza   Vaccine 0.5 milliLiter(s) IntraMuscular once  thiamine 100 milliGRAM(s) Oral daily    MEDICATIONS  (PRN):  LORazepam   Injectable 2 milliGRAM(s) IntraMuscular every 4 hours PRN CIWA > 8      Allergies    No Known Allergies    Intolerances           LABS:                        10.3   11.83 )-----------( 120      ( 11 Sep 2018 07:50 )             30.0     09-11    125<L>  |  87<L>  |  14  ----------------------------<  106<H>  3.0<L>   |  27  |  0.91    Ca    8.8      11 Sep 2018 06:09        PT/INR - ( 11 Sep 2018 07:58 )   PT: 18.1 sec;   INR: 1.59 ratio         SAAG 1.8  Cell count < 250

## 2018-09-11 NOTE — PROGRESS NOTE ADULT - SUBJECTIVE AND OBJECTIVE BOX
[facility], Division of Infectious Diseases  GHAZALA Oliver A. Lee  [phone]  Name: HANDY EARLY  Age: 52y  Gender: Male  MRN: [id number]    Interval History--  Notes reviewed  states legs are tender  but no change, not painful  still swollen    Past Medical History--  Atrial fibrillation  Liver disease due to alcohol  Dupuytren contracture      For details regarding the patient's social history, family history, and other miscellaneous elements, please refer the initial infectious diseases consultation and/or the admitting history and physical examination for this admission.    Allergies    No Known Allergies    Intolerances        Medications--  Antibiotics:  ampicillin/sulbactam  IVPB 1.5 Gram(s) IV Intermittent every 6 hours    Immunologic:  influenza   Vaccine 0.5 milliLiter(s) IntraMuscular once    Other:  chlordiazePOXIDE  folic acid  furosemide   Injectable  LORazepam   Injectable PRN  potassium chloride    Tablet ER  rivaroxaban  thiamine      Review of Systems--  A 10-point review of systems was obtained.     Pertinent positives and negatives--  Constitutional: No fevers. No Chills. No Rigors.   Cardiovascular: No chest pain. No palpitations.  Respiratory: No shortness of breath. No cough.  Gastrointestinal: No nausea or vomiting. No diarrhea or constipation.   Psychiatric: + depression    Review of systems otherwise negative except as previously noted.    Physical Examination--  Vital Signs: T(F): 97.7 (09-11-18 @ 11:37), Max: 98.7 (09-10-18 @ 19:32)  HR: 115 (09-11-18 @ 11:37)  BP: 106/68 (09-11-18 @ 11:37)  RR: 18 (09-11-18 @ 11:37)  SpO2: 96% (09-11-18 @ 11:37)  Wt(kg): --  General: Nontoxic-appearing Male in no acute distress.  HEENT: AT/NC. Anicteric. Conjunctiva pink and moist. .  Neck: Not rigid. No sense of mass.  Nodes: None palpable.  Lungs: Clear bilaterally without rales, wheezing or rhonchi  Heart: tachy s1s2  Abdomen: Bowel sounds present and normoactive. Soft. Nondistended. Nontender.  Extremities: No cyanosis or clubbing. ++ edema. ++ erythema + warm back of leg thigh with edema  Skin: Warm. Dry. Good turgor. No rash. No vasculitic stigmata.  Psychiatric: flat affect        Laboratory Studies--  CBC                        10.3   11.83 )-----------( 120      ( 11 Sep 2018 07:50 )             30.0       Chemistries  09-11    125<L>  |  87<L>  |  14  ----------------------------<  106<H>  3.0<L>   |  27  |  0.91    Ca    8.8      11 Sep 2018 06:09        Culture Data    Culture - Fungal, Body Fluid (collected 10 Sep 2018 22:26)  Source: .Body Fluid Peritoneal Fluid  Preliminary Report (11 Sep 2018 07:11):    Testing in progress    Culture - Body Fluid with Gram Stain (collected 10 Sep 2018 22:26)  Source: .Body Fluid Peritoneal Fluid  Gram Stain (10 Sep 2018 23:36):    polymorphonuclear leukocytes seen    No organisms seen by cytocentrifuge    Culture - Urine (collected 06 Sep 2018 05:09)  Source: .Urine Clean Catch (Midstream)  Final Report (07 Sep 2018 05:25):    No growth    Culture - Blood (collected 06 Sep 2018 04:44)  Source: .Blood Blood-Peripheral  Final Report (11 Sep 2018 05:00):    No growth at 5 days.    Culture - Blood (collected 06 Sep 2018 04:44)  Source: .Blood Blood-Peripheral  Final Report (11 Sep 2018 05:00):    No growth at 5 days.          < from: IR Procedure (09.10.18 @ 19:10) >    EXAM:  IR PROCEDURE NON PICC                                PROCEDURE DATE:  09/10/2018          INTERPRETATION:  Date: 9/10/2018   Procedure: Image guided therapeutic and diagnostic paracentesis.    Clinical data: 82-year-old male with history of alcoholic liver cirrhosis   with new onset ascites referred to IR for therapeutic and diagnostic   paracentesis.     Operators: PARUL Long  Supervising physician: Dr. Edd Torres    Procedure: Following informed consent, the patient was placed supine in   the interventional radiology holding area.  Continuous hemodynamic   monitoring was performed.  Limited ultrasound was used to localize   ascites in the right lower quadrant.  Ultrasound images are archived. The   right lower quadrant was prepped and draped in sterile fashion.  Skin   anesthesia was achieved using 2% lidocaine solution.  The peritoneal   cavity was punctured using a 5 Samo[place] Yueh sheathed needle.  Clear yellow   ascites was aspirated with specimens sent for analysis.  The sheath was   advanced into the peritoneal cavity and via the sheath approximately 5800   cc of ascites were drained.  Albumin was replaced intravenously as   necessary. The sheath was removed and a sterile dressing was applied.    The patient tolerated the procedure with no immediate complication.    Impression: Ultrasound-guided paracentesis as above        < end of copied text >    Cell Count, Body Fluid (09.10.18 @ 19:14)    Monocyte/Macrophage Count - Body Fluid: 39 %    Fluid Segmented Granulocytes: 5 %    Fluid Type: Peritoneal fl    Body Fluid Appearance: Hazy    BF Lymphocytes: 45 %    Other Body Cells: 2: To be reviewed by hematopathologist. %    Mesothelial Cells - Body Fluid: 9 %    Tube Type: Sterile    Color - Body Fluid: Yellow    Total Nucleated Cell Count, Body Fluid: 108: Includes WBC and other nucleated cells if present. /uL    Total RBC Count: 440 /uL

## 2018-09-11 NOTE — PROGRESS NOTE ADULT - SUBJECTIVE AND OBJECTIVE BOX
Eating breakfast, mild tremors in hands    Vital Signs Last 24 Hrs  T(C): 37 (11 Sep 2018 04:42), Max: 37.1 (10 Sep 2018 19:32)  T(F): 98.6 (11 Sep 2018 04:42), Max: 98.7 (10 Sep 2018 19:32)  HR: 110 (11 Sep 2018 04:42) (103 - 118)  BP: 97/52 (11 Sep 2018 04:42) (95/56 - 117/68)  BP(mean): --  RR: 18 (11 Sep 2018 04:42) (18 - 18)  SpO2: 94% (11 Sep 2018 04:42) (92% - 94%)    GENERAL: NAD, AAOx3  HEAD:  Atraumatic, Normocephalic  EYES: EOMI, (+)icterus  NECK: Supple, No JVD, No LAD  CHEST/LUNG: Clear to auscultation bilaterally; No wheeze  HEART: Regular rate and rhythm; No murmurs, rubs, or gallops  ABDOMEN: Markedly distended without focal tenderness  EXTREMITIES:  2+ Peripheral Pulses, mild tremors in hands when reaching for breakfast tray  SKIN: Anasarca with beefy red skin rash that is warm to touch, confluent, non blanching, non purulent.  NEURO: nonfocal CN/motor/sensory/reflexes      LABS:                        10.3   11.83 )-----------( 120      ( 11 Sep 2018 07:50 )             30.0     09-11    125<L>  |  87<L>  |  14  ----------------------------<  106<H>  3.0<L>   |  27  |  0.91    Ca    8.8      11 Sep 2018 06:09      PT/INR - ( 11 Sep 2018 07:58 )   PT: 18.1 sec;   INR: 1.59 ratio           CAPILLARY BLOOD GLUCOSE

## 2018-09-12 LAB
ALBUMIN SERPL ELPH-MCNC: 2.1 G/DL — LOW (ref 3.3–5)
ALP SERPL-CCNC: 118 U/L — SIGNIFICANT CHANGE UP (ref 40–120)
ALT FLD-CCNC: 62 U/L — HIGH (ref 10–45)
ANION GAP SERPL CALC-SCNC: 13 MMOL/L — SIGNIFICANT CHANGE UP (ref 5–17)
AST SERPL-CCNC: 138 U/L — HIGH (ref 10–40)
BILIRUB SERPL-MCNC: 5 MG/DL — HIGH (ref 0.2–1.2)
BUN SERPL-MCNC: 14 MG/DL — SIGNIFICANT CHANGE UP (ref 7–23)
CALCIUM SERPL-MCNC: 8 MG/DL — LOW (ref 8.4–10.5)
CHLORIDE SERPL-SCNC: 91 MMOL/L — LOW (ref 96–108)
CO2 SERPL-SCNC: 28 MMOL/L — SIGNIFICANT CHANGE UP (ref 22–31)
COMMENT - FLUIDS: SIGNIFICANT CHANGE UP
CREAT SERPL-MCNC: 0.86 MG/DL — SIGNIFICANT CHANGE UP (ref 0.5–1.3)
GLUCOSE SERPL-MCNC: 89 MG/DL — SIGNIFICANT CHANGE UP (ref 70–99)
HCT VFR BLD CALC: 28.5 % — LOW (ref 39–50)
HGB BLD-MCNC: 9.5 G/DL — LOW (ref 13–17)
INR BLD: 4.57 RATIO — HIGH (ref 0.88–1.16)
MAGNESIUM SERPL-MCNC: 1.9 MG/DL — SIGNIFICANT CHANGE UP (ref 1.6–2.6)
MCHC RBC-ENTMCNC: 33.3 GM/DL — SIGNIFICANT CHANGE UP (ref 32–36)
MCHC RBC-ENTMCNC: 35.1 PG — HIGH (ref 27–34)
MCV RBC AUTO: 105.2 FL — HIGH (ref 80–100)
PLATELET # BLD AUTO: 108 K/UL — LOW (ref 150–400)
POTASSIUM SERPL-MCNC: 3.6 MMOL/L — SIGNIFICANT CHANGE UP (ref 3.5–5.3)
POTASSIUM SERPL-SCNC: 3.6 MMOL/L — SIGNIFICANT CHANGE UP (ref 3.5–5.3)
PROT SERPL-MCNC: 5.8 G/DL — LOW (ref 6–8.3)
PROTHROM AB SERPL-ACNC: 53.3 SEC — HIGH (ref 10–13.1)
RBC # BLD: 2.71 M/UL — LOW (ref 4.2–5.8)
RBC # FLD: 15.5 % — HIGH (ref 10.3–14.5)
SODIUM SERPL-SCNC: 131 MMOL/L — LOW (ref 135–145)
WBC # BLD: 12.46 K/UL — HIGH (ref 3.8–10.5)
WBC # FLD AUTO: 12.46 K/UL — HIGH (ref 3.8–10.5)

## 2018-09-12 RX ORDER — SPIRONOLACTONE 25 MG/1
50 TABLET, FILM COATED ORAL DAILY
Qty: 0 | Refills: 0 | Status: DISCONTINUED | OUTPATIENT
Start: 2018-09-12 | End: 2018-09-13

## 2018-09-12 RX ADMIN — Medication 25 MILLIGRAM(S): at 08:24

## 2018-09-12 RX ADMIN — AMPICILLIN SODIUM AND SULBACTAM SODIUM 100 GRAM(S): 250; 125 INJECTION, POWDER, FOR SUSPENSION INTRAMUSCULAR; INTRAVENOUS at 23:52

## 2018-09-12 RX ADMIN — AMPICILLIN SODIUM AND SULBACTAM SODIUM 100 GRAM(S): 250; 125 INJECTION, POWDER, FOR SUSPENSION INTRAMUSCULAR; INTRAVENOUS at 05:26

## 2018-09-12 RX ADMIN — AMPICILLIN SODIUM AND SULBACTAM SODIUM 100 GRAM(S): 250; 125 INJECTION, POWDER, FOR SUSPENSION INTRAMUSCULAR; INTRAVENOUS at 12:11

## 2018-09-12 RX ADMIN — Medication 100 MILLIGRAM(S): at 12:12

## 2018-09-12 RX ADMIN — Medication 1 MILLIGRAM(S): at 12:12

## 2018-09-12 RX ADMIN — Medication 25 MILLIGRAM(S): at 21:01

## 2018-09-12 RX ADMIN — Medication 40 MILLIGRAM(S): at 17:25

## 2018-09-12 RX ADMIN — SPIRONOLACTONE 50 MILLIGRAM(S): 25 TABLET, FILM COATED ORAL at 17:25

## 2018-09-12 RX ADMIN — AMPICILLIN SODIUM AND SULBACTAM SODIUM 100 GRAM(S): 250; 125 INJECTION, POWDER, FOR SUSPENSION INTRAMUSCULAR; INTRAVENOUS at 17:25

## 2018-09-12 RX ADMIN — Medication 40 MILLIGRAM(S): at 05:24

## 2018-09-12 NOTE — PROGRESS NOTE ADULT - ASSESSMENT
52-yo Male with PMHx of Afib on Xarelto, Alcoholic hepatitis presents with severe sepsis and ascites.

## 2018-09-12 NOTE — PROGRESS NOTE ADULT - SUBJECTIVE AND OBJECTIVE BOX
Drowsy  LE edema improved    Vital Signs Last 24 Hrs  T(C): 36.6 (12 Sep 2018 11:54), Max: 36.7 (12 Sep 2018 00:17)  T(F): 97.9 (12 Sep 2018 11:54), Max: 98.1 (12 Sep 2018 00:17)  HR: 110 (12 Sep 2018 11:54) (105 - 116)  BP: 109/73 (12 Sep 2018 11:54) (100/54 - 116/69)  BP(mean): --  RR: 18 (12 Sep 2018 11:54) (18 - 18)  SpO2: 95% (12 Sep 2018 11:54) (90% - 95%)    PHYSICAL EXAM:    Constitutional: NAD, well-developed  Neck: No LAD, supple  Respiratory: CTA and P  Cardiovascular: S1 and S2, RRR, no M  Gastrointestinal: BS+, soft, + ascites  Extremities: ++ peripheral edema, neg clubing, cyanosis  Vascular: 2+ peripheral pulses      MEDICATIONS  (STANDING):  ampicillin/sulbactam  IVPB 1.5 Gram(s) IV Intermittent every 6 hours  chlordiazePOXIDE 25 milliGRAM(s) Oral every 12 hours  folic acid 1 milliGRAM(s) Oral daily  furosemide   Injectable 40 milliGRAM(s) IV Push two times a day  influenza   Vaccine 0.5 milliLiter(s) IntraMuscular once  spironolactone 50 milliGRAM(s) Oral daily  thiamine 100 milliGRAM(s) Oral daily    MEDICATIONS  (PRN):  LORazepam   Injectable 2 milliGRAM(s) IntraMuscular every 4 hours PRN CIWA > 8      Allergies    No Known Allergies    Intolerances          LABS:                        9.5    12.46 )-----------( 108      ( 12 Sep 2018 07:34 )             28.5                         10.3   11.83 )-----------( 120      ( 11 Sep 2018 07:50 )             30.0                         10.8   14.62 )-----------( 135      ( 10 Sep 2018 09:29 )             32.1     09-12    131<L>  |  91<L>  |  14  ----------------------------<  89  3.6   |  28  |  0.86    Ca    8.0<L>      12 Sep 2018 06:05  Mg     1.9     09-12    TPro  5.8<L>  /  Alb  2.1<L>  /  TBili  5.0<H>  /  DBili  x   /  AST  138<H>  /  ALT  62<H>  /  AlkPhos  118  09-12    PT/INR - ( 12 Sep 2018 07:45 )   PT: 53.3 sec;   INR: 4.57 ratio             LIVER FUNCTIONS - ( 12 Sep 2018 06:05 )  Alb: 2.1 g/dL / Pro: 5.8 g/dL / ALK PHOS: 118 U/L / ALT: 62 U/L / AST: 138 U/L / GGT: x               RADIOLOGY & ADDITIONAL TESTS:

## 2018-09-12 NOTE — PROGRESS NOTE ADULT - SUBJECTIVE AND OBJECTIVE BOX
[facility], Division of Infectious Diseases  GHAZALA Oliver A. Lee  [phone]  Name: HANDY EARLY  Age: 52y  Gender: Male  MRN: [id number]    Interval History--  Notes reviewed  pt remains very sedate  girlfriend at bedside  legs feel tight but less swollen    Past Medical History--  Atrial fibrillation  Liver disease due to alcohol  Dupuytren contracture      For details regarding the patient's social history, family history, and other miscellaneous elements, please refer the initial infectious diseases consultation and/or the admitting history and physical examination for this admission.    Allergies    No Known Allergies    Intolerances        Medications--  Antibiotics:  ampicillin/sulbactam  IVPB 1.5 Gram(s) IV Intermittent every 6 hours    Immunologic:  influenza   Vaccine 0.5 milliLiter(s) IntraMuscular once    Other:  chlordiazePOXIDE  folic acid  furosemide   Injectable  LORazepam   Injectable PRN  thiamine      Review of Systems--  A 10-point review of systems was obtained.     unable to obtain    Review of systems otherwise negative except as previously noted.    Physical Examination--  Vital Signs: T(F): 97.9 (09-12-18 @ 11:54), Max: 98.1 (09-12-18 @ 00:17)  HR: 110 (09-12-18 @ 11:54)  BP: 109/73 (09-12-18 @ 11:54)  RR: 18 (09-12-18 @ 11:54)  SpO2: 95% (09-12-18 @ 11:54)  Wt(kg): --  General: Nontoxic-appearing Male in no acute distress.  HEENT: AT/NC.  Anicteric. Conjunctiva pink and moist. .  Neck: Not rigid. No sense of mass.  Nodes: None palpable.  Lungs: Clear bilaterally without rales, wheezing or rhonchi  Heart: Regular rate and rhythm. No Murmur. No rub.   Abdomen: Bowel sounds present and normoactive. Soft. Nondistended. Nontender.   Extremities: No cyanosis or clubbing. decreased edema. + erythema, not warm, tender to deep palpation  Skin: Warm. Dry. Good turgor. No rash. No vasculitic stigmata.  Psychiatric: lethargic        Laboratory Studies--  CBC                        9.5    12.46 )-----------( 108      ( 12 Sep 2018 07:34 )             28.5       Chemistries  09-12    131<L>  |  91<L>  |  14  ----------------------------<  89  3.6   |  28  |  0.86    Ca    8.0<L>      12 Sep 2018 06:05  Mg     1.9     09-12    TPro  5.8<L>  /  Alb  2.1<L>  /  TBili  5.0<H>  /  DBili  x   /  AST  138<H>  /  ALT  62<H>  /  AlkPhos  118  09-12      Culture Data    Culture - Fungal, Body Fluid (collected 10 Sep 2018 22:26)  Source: .Body Fluid Peritoneal Fluid  Preliminary Report (11 Sep 2018 07:11):    Testing in progress    Culture - Body Fluid with Gram Stain (collected 10 Sep 2018 22:26)  Source: .Body Fluid Peritoneal Fluid  Gram Stain (10 Sep 2018 23:36):    polymorphonuclear leukocytes seen    No organisms seen by cytocentrifuge  Preliminary Report (11 Sep 2018 17:14):    No growth    Culture - Acid Fast - Body Fluid w/Smear (collected 10 Sep 2018 22:26)  Source: .Body Fluid Peritoneal Fluid    Culture - Urine (collected 06 Sep 2018 05:09)  Source: .Urine Clean Catch (Midstream)  Final Report (07 Sep 2018 05:25):    No growth    Culture - Blood (collected 06 Sep 2018 04:44)  Source: .Blood Blood-Peripheral  Final Report (11 Sep 2018 05:00):    No growth at 5 days.    Culture - Blood (collected 06 Sep 2018 04:44)  Source: .Blood Blood-Peripheral  Final Report (11 Sep 2018 05:00):    No growth at 5 days.

## 2018-09-12 NOTE — PROGRESS NOTE ADULT - ASSESSMENT
Alcoholic Hepatitis  Decompensated Cirrhosis, presumed alcoholic, complicated by ascites, r/o SBP  Hyponatremia    Rec:  Dx paracentesis (on abx) without elevated cell count.  Continue Lasix 40 IV BID  Added spironolactone 50mg PO d today  Daily CHEM7

## 2018-09-12 NOTE — PROGRESS NOTE ADULT - ASSESSMENT
52M pmhx of Afib on xarelto, alcoholic hepatitis with cirrhosis and ascities with severe hyponatremia and fluid overloaded    1- hyponatremia  2- ascites  3- alcoholism     na incrasing with improving volume status   k low  lasix  40 mg iv bid weight improving  slowly.  aldactone 50 mg daily   k  better  in am if weight does not improve further increase lasix to 60 mg iv bid   one liter fluid restriction to cont   CIWA protocol   thiamine

## 2018-09-12 NOTE — PROGRESS NOTE ADULT - SUBJECTIVE AND OBJECTIVE BOX
[facility]   [phone]  RENAL FOLLOW UP NOTE  --------------------------------------------------------------------------------  Chief Complaint:    24 hour events/subjective:    states eating a little more. feels better than before. states edema is improving  significant other at bedside     PAST HISTORY  --------------------------------------------------------------------------------  No significant changes to PMH, PSH, FHx, SHx, unless otherwise noted    ALLERGIES & MEDICATIONS  --------------------------------------------------------------------------------  Allergies    No Known Allergies    Intolerances      Standing Inpatient Medications  ampicillin/sulbactam  IVPB 1.5 Gram(s) IV Intermittent every 6 hours  chlordiazePOXIDE 25 milliGRAM(s) Oral every 12 hours  folic acid 1 milliGRAM(s) Oral daily  furosemide   Injectable 40 milliGRAM(s) IV Push two times a day  influenza   Vaccine 0.5 milliLiter(s) IntraMuscular once  spironolactone 50 milliGRAM(s) Oral daily  thiamine 100 milliGRAM(s) Oral daily    PRN Inpatient Medications  LORazepam   Injectable 2 milliGRAM(s) IntraMuscular every 4 hours PRN      REVIEW OF SYSTEMS  --------------------------------------------------------------------------------    Gen: denies fevers/chills,  CVS: denies chest pain/palpitations  Resp: denies SOB/Cough  GI: Denies N/V/Abd pain  : Denies dysuria/oliguria/hematuria    All other systems were reviewed and are negative, except as noted.    VITALS/PHYSICAL EXAM  --------------------------------------------------------------------------------  T(C): 36.7 (09-12-18 @ 20:09), Max: 36.7 (09-12-18 @ 00:17)  HR: 115 (09-12-18 @ 20:09) (105 - 120)  BP: 114/69 (09-12-18 @ 20:09) (100/54 - 126/77)  RR: 18 (09-12-18 @ 20:09) (18 - 18)  SpO2: 95% (09-12-18 @ 20:09) (90% - 95%)  Wt(kg): --        09-11-18 @ 07:01  -  09-12-18 @ 07:00  --------------------------------------------------------  IN: 1155 mL / OUT: 550 mL / NET: 605 mL    09-12-18 @ 07:01  -  09-12-18 @ 21:24  --------------------------------------------------------  IN: 730 mL / OUT: 300 mL / NET: 430 mL      Physical Exam:    Gen:  comfortable appearing   	no jvd , supple neck,   	Pulm: decrease bs  no rales or ronchi or wheezing  	CV: RRR, S1S2; no rub  	Abd: +BS, soft, + ascites decreased   	: No suprapubic tenderness  	UE: Warm, no cyanosis  no clubbing,  no edema; no asterixis  	LE: Warm, no cyanosis  no clubbing, 2-3+ edema  		    LABS/STUDIES  --------------------------------------------------------------------------------              9.5    12.46 >-----------<  108      [09-12-18 @ 07:34]              28.5     131  |  91  |  14  ----------------------------<  89      [09-12-18 @ 06:05]  3.6   |  28  |  0.86        Ca     8.0     [09-12-18 @ 06:05]      Mg     1.9     [09-12-18 @ 06:05]    TPro  5.8  /  Alb  2.1  /  TBili  5.0  /  DBili  x   /  AST  138  /  ALT  62  /  AlkPhos  118  [09-12-18 @ 06:05]    PT/INR: PT 53.3 , INR 4.57       [09-12-18 @ 07:45]      Creatinine Trend:  SCr 0.86 [09-12 @ 06:05]  SCr 0.91 [09-11 @ 06:09]  SCr 0.83 [09-10 @ 07:21]  SCr 0.91 [09-09 @ 06:32]  SCr 0.84 [09-08 @ 15:33]              Urinalysis - [09-06-18 @ 01:50]      Color Brown / Appearance SL Turbid / SG 1.018 / pH 6.0      Gluc Negative / Ketone Negative  / Bili Moderate / Urobili 8       Blood Negative / Protein Trace / Leuk Est Negative / Nitrite Negative      RBC 0-2 / WBC 3-5 / Hyaline 0-2 / Gran  / Sq Epi  / Non Sq Epi OCC / Bacteria Few    Urine Sodium <20      [09-06-18 @ 11:26]  Urine Osmolality 422      [09-06-18 @ 04:57]    Iron 67, TIBC 94, %sat 71      [09-08-18 @ 10:01]  Ferritin 1886      [09-08-18 @ 09:14]

## 2018-09-12 NOTE — PROGRESS NOTE ADULT - SUBJECTIVE AND OBJECTIVE BOX
Patient is a 52y old  Male who presents with a chief complaint of Abdominal swelling (12 Sep 2018 15:58)      SUBJECTIVE / OVERNIGHT EVENTS:  awake, but sleepy.  girlfriend of six years at bedside. lives together.  she takes care of him at home and she is HCP.  pt denied pain. still drowsy.  no cp, no sob. no n/v/d      Vital Signs Last 24 Hrs  T(C): 36.6 (12 Sep 2018 11:54), Max: 36.7 (12 Sep 2018 00:17)  T(F): 97.9 (12 Sep 2018 11:54), Max: 98.1 (12 Sep 2018 00:17)  HR: 120 (12 Sep 2018 17:15) (105 - 120)  BP: 126/77 (12 Sep 2018 17:15) (100/54 - 126/77)  BP(mean): --  RR: 18 (12 Sep 2018 11:54) (18 - 18)  SpO2: 95% (12 Sep 2018 11:54) (90% - 95%)  I&O's Summary    11 Sep 2018 07:01  -  12 Sep 2018 07:00  --------------------------------------------------------  IN: 1155 mL / OUT: 550 mL / NET: 605 mL    12 Sep 2018 07:01  -  12 Sep 2018 20:06  --------------------------------------------------------  IN: 480 mL / OUT: 300 mL / NET: 180 mL        PHYSICAL EXAM:  GENERAL: NAD, AAOx3  HEAD:  Atraumatic, Normocephalic  EYES: EOMI, mild (+)icterus  NECK: Supple, No JVD, No LAD  CHEST/LUNG: mild dec breath sounds bilaterally; No wheeze  HEART: Regular rate and rhythm; No murmurs, rubs, or gallops  ABDOMEN: Markedly distended without focal tenderness  EXTREMITIES:  2+ Peripheral Pulses, mild tremors in hands when elbow extended.   SKIN: Anasarca with beefy red skin rash that is warm to touch, confluent, non blanching, non purulent.  NEURO: nonfocal CN/motor/sensory/reflexes      LABS:                        9.5    12.46 )-----------( 108      ( 12 Sep 2018 07:34 )             28.5     09-12    131<L>  |  91<L>  |  14  ----------------------------<  89  3.6   |  28  |  0.86    Ca    8.0<L>      12 Sep 2018 06:05  Mg     1.9     09-12    TPro  5.8<L>  /  Alb  2.1<L>  /  TBili  5.0<H>  /  DBili  x   /  AST  138<H>  /  ALT  62<H>  /  AlkPhos  118  09-12    PT/INR - ( 12 Sep 2018 07:45 )   PT: 53.3 sec;   INR: 4.57 ratio           CAPILLARY BLOOD GLUCOSE                RADIOLOGY & ADDITIONAL TESTS:    Imaging Personally Reviewed:  [x] YES  [ ] NO    Consultant(s) Notes Reviewed:  [x] YES  [ ] NO      MEDICATIONS  (STANDING):  ampicillin/sulbactam  IVPB 1.5 Gram(s) IV Intermittent every 6 hours  chlordiazePOXIDE 25 milliGRAM(s) Oral every 12 hours  folic acid 1 milliGRAM(s) Oral daily  furosemide   Injectable 40 milliGRAM(s) IV Push two times a day  influenza   Vaccine 0.5 milliLiter(s) IntraMuscular once  spironolactone 50 milliGRAM(s) Oral daily  thiamine 100 milliGRAM(s) Oral daily    MEDICATIONS  (PRN):  LORazepam   Injectable 2 milliGRAM(s) IntraMuscular every 4 hours PRN CIWA > 8      Care Discussed with Consultants/Other Providers [x] YES  [ ] NO    HEALTH ISSUES - PROBLEM Dx:  Erythema of lower extremity: Erythema of lower extremity  SBP (spontaneous bacterial peritonitis): SBP (spontaneous bacterial peritonitis)  Swelling of lower limb: Swelling of lower limb  Suspected deep vein thrombosis  Alcohol abuse: Alcohol abuse  Afib: Afib  Macrocytic anemia: Macrocytic anemia  Alcoholic hepatitis: Alcoholic hepatitis  Ascites: Ascites  Hyponatremia: Hyponatremia  Severe sepsis: Severe sepsis

## 2018-09-12 NOTE — PROGRESS NOTE ADULT - PROBLEM SELECTOR PLAN 4
Hypervolemic hyponatremia likely 2/2 ascites  Appreciate renal  cont IV Lasix 40 mg bid  Fluid restriction 1L  Strict I/Os  Daily weights Hypervolemic hyponatremia likely 2/2 ascites  Appreciate renal  cont IV Lasix 40 mg bid, restart Spironolactone 50 mg qdaily  Fluid restriction 1L  Strict I/Os  Daily weights

## 2018-09-13 LAB
ANA PAT FLD IF-IMP: SIGNIFICANT CHANGE UP
ANA TITR SER: ABNORMAL
ANION GAP SERPL CALC-SCNC: 13 MMOL/L — SIGNIFICANT CHANGE UP (ref 5–17)
ANION GAP SERPL CALC-SCNC: 13 MMOL/L — SIGNIFICANT CHANGE UP (ref 5–17)
APTT BLD: 31.8 SEC — SIGNIFICANT CHANGE UP (ref 27.5–37.4)
BUN SERPL-MCNC: 13 MG/DL — SIGNIFICANT CHANGE UP (ref 7–23)
BUN SERPL-MCNC: 14 MG/DL — SIGNIFICANT CHANGE UP (ref 7–23)
CALCIUM SERPL-MCNC: 7.9 MG/DL — LOW (ref 8.4–10.5)
CALCIUM SERPL-MCNC: 8.4 MG/DL — SIGNIFICANT CHANGE UP (ref 8.4–10.5)
CHLORIDE SERPL-SCNC: 90 MMOL/L — LOW (ref 96–108)
CHLORIDE SERPL-SCNC: 92 MMOL/L — LOW (ref 96–108)
CO2 SERPL-SCNC: 27 MMOL/L — SIGNIFICANT CHANGE UP (ref 22–31)
CO2 SERPL-SCNC: 28 MMOL/L — SIGNIFICANT CHANGE UP (ref 22–31)
CREAT SERPL-MCNC: 0.82 MG/DL — SIGNIFICANT CHANGE UP (ref 0.5–1.3)
CREAT SERPL-MCNC: 0.89 MG/DL — SIGNIFICANT CHANGE UP (ref 0.5–1.3)
GLUCOSE BLDC GLUCOMTR-MCNC: 103 MG/DL — HIGH (ref 70–99)
GLUCOSE SERPL-MCNC: 126 MG/DL — HIGH (ref 70–99)
GLUCOSE SERPL-MCNC: 99 MG/DL — SIGNIFICANT CHANGE UP (ref 70–99)
HCT VFR BLD CALC: 28.7 % — LOW (ref 39–50)
HGB BLD-MCNC: 9.9 G/DL — LOW (ref 13–17)
INR BLD: 2.48 RATIO — HIGH (ref 0.88–1.16)
MCHC RBC-ENTMCNC: 34.5 GM/DL — SIGNIFICANT CHANGE UP (ref 32–36)
MCHC RBC-ENTMCNC: 36.5 PG — HIGH (ref 27–34)
MCV RBC AUTO: 105.9 FL — HIGH (ref 80–100)
PLATELET # BLD AUTO: 101 K/UL — LOW (ref 150–400)
POTASSIUM SERPL-MCNC: 3.1 MMOL/L — LOW (ref 3.5–5.3)
POTASSIUM SERPL-MCNC: 3.7 MMOL/L — SIGNIFICANT CHANGE UP (ref 3.5–5.3)
POTASSIUM SERPL-SCNC: 3.1 MMOL/L — LOW (ref 3.5–5.3)
POTASSIUM SERPL-SCNC: 3.7 MMOL/L — SIGNIFICANT CHANGE UP (ref 3.5–5.3)
PROTHROM AB SERPL-ACNC: 28.5 SEC — HIGH (ref 10–13.1)
RBC # BLD: 2.71 M/UL — LOW (ref 4.2–5.8)
RBC # FLD: 15 % — HIGH (ref 10.3–14.5)
SODIUM SERPL-SCNC: 131 MMOL/L — LOW (ref 135–145)
SODIUM SERPL-SCNC: 132 MMOL/L — LOW (ref 135–145)
WBC # BLD: 12.53 K/UL — HIGH (ref 3.8–10.5)
WBC # FLD AUTO: 12.53 K/UL — HIGH (ref 3.8–10.5)

## 2018-09-13 RX ORDER — POTASSIUM CHLORIDE 20 MEQ
40 PACKET (EA) ORAL EVERY 4 HOURS
Qty: 0 | Refills: 0 | Status: COMPLETED | OUTPATIENT
Start: 2018-09-13 | End: 2018-09-13

## 2018-09-13 RX ORDER — POTASSIUM CHLORIDE 20 MEQ
20 PACKET (EA) ORAL ONCE
Qty: 0 | Refills: 0 | Status: COMPLETED | OUTPATIENT
Start: 2018-09-13 | End: 2018-09-13

## 2018-09-13 RX ORDER — SPIRONOLACTONE 25 MG/1
100 TABLET, FILM COATED ORAL DAILY
Qty: 0 | Refills: 0 | Status: DISCONTINUED | OUTPATIENT
Start: 2018-09-13 | End: 2018-09-24

## 2018-09-13 RX ORDER — PREGABALIN 225 MG/1
500 CAPSULE ORAL DAILY
Qty: 0 | Refills: 0 | Status: DISCONTINUED | OUTPATIENT
Start: 2018-09-13 | End: 2018-09-25

## 2018-09-13 RX ADMIN — Medication 40 MILLIGRAM(S): at 06:32

## 2018-09-13 RX ADMIN — Medication 25 MILLIGRAM(S): at 21:50

## 2018-09-13 RX ADMIN — Medication 40 MILLIEQUIVALENT(S): at 10:16

## 2018-09-13 RX ADMIN — AMPICILLIN SODIUM AND SULBACTAM SODIUM 100 GRAM(S): 250; 125 INJECTION, POWDER, FOR SUSPENSION INTRAMUSCULAR; INTRAVENOUS at 21:51

## 2018-09-13 RX ADMIN — Medication 40 MILLIEQUIVALENT(S): at 16:03

## 2018-09-13 RX ADMIN — Medication 20 MILLIEQUIVALENT(S): at 21:50

## 2018-09-13 RX ADMIN — Medication 40 MILLIGRAM(S): at 18:08

## 2018-09-13 RX ADMIN — SPIRONOLACTONE 50 MILLIGRAM(S): 25 TABLET, FILM COATED ORAL at 06:32

## 2018-09-13 RX ADMIN — SPIRONOLACTONE 100 MILLIGRAM(S): 25 TABLET, FILM COATED ORAL at 16:12

## 2018-09-13 RX ADMIN — Medication 100 MILLIGRAM(S): at 10:16

## 2018-09-13 RX ADMIN — Medication 1 MILLIGRAM(S): at 10:17

## 2018-09-13 RX ADMIN — PREGABALIN 500 MICROGRAM(S): 225 CAPSULE ORAL at 18:13

## 2018-09-13 RX ADMIN — AMPICILLIN SODIUM AND SULBACTAM SODIUM 100 GRAM(S): 250; 125 INJECTION, POWDER, FOR SUSPENSION INTRAMUSCULAR; INTRAVENOUS at 05:49

## 2018-09-13 RX ADMIN — AMPICILLIN SODIUM AND SULBACTAM SODIUM 100 GRAM(S): 250; 125 INJECTION, POWDER, FOR SUSPENSION INTRAMUSCULAR; INTRAVENOUS at 16:03

## 2018-09-13 RX ADMIN — Medication 25 MILLIGRAM(S): at 10:16

## 2018-09-13 NOTE — DIETITIAN INITIAL EVALUATION ADULT. - PHYSICAL APPEARANCE
overweight/BMI 28.0, Nutrition Focused Physical Exam performed with pt's verbal consent with the following findings: moderate muscle wasting to temporal and clavicle.

## 2018-09-13 NOTE — DIETITIAN INITIAL EVALUATION ADULT. - OTHER INFO
Pt seen for routine length of stay [place]. Pt reports poor po intake PTA, no appetite for food. Per H&P, pt was drinking 4-6 beers or hard liquor drinks per day every day for > 20 years. Pt reports eating well during hospital admission, consuming % of 3 meals daily. Pt reports UBW around 190 lbs. his lowest weight he remembers while on lasix, but would increase over time with fluid accumulation. Pt was 204 lbs. on admission and is now 184 lbs., wt loss likely attributable to fluid loss and paracentesis. No c/o N/V/D/C and denies chewing/swallowing difficulties.

## 2018-09-13 NOTE — PROGRESS NOTE ADULT - SUBJECTIVE AND OBJECTIVE BOX
[facility]   [phone]  RENAL FOLLOW UP NOTE  --------------------------------------------------------------------------------  Chief Complaint:    24 hour events/subjective:    seen. more somnolence today     PAST HISTORY  --------------------------------------------------------------------------------  No significant changes to PMH, PSH, FHx, SHx, unless otherwise noted    ALLERGIES & MEDICATIONS  --------------------------------------------------------------------------------  Allergies    No Known Allergies    Intolerances      Standing Inpatient Medications  ampicillin/sulbactam  IVPB 1.5 Gram(s) IV Intermittent every 6 hours  chlordiazePOXIDE 25 milliGRAM(s) Oral every 12 hours  cyanocobalamin 500 MICROGram(s) Oral daily  folic acid 1 milliGRAM(s) Oral daily  furosemide   Injectable 40 milliGRAM(s) IV Push two times a day  influenza   Vaccine 0.5 milliLiter(s) IntraMuscular once  potassium chloride    Tablet ER 20 milliEquivalent(s) Oral once  rifaximin 550 milliGRAM(s) Oral two times a day  spironolactone 100 milliGRAM(s) Oral daily  thiamine 100 milliGRAM(s) Oral daily    PRN Inpatient Medications      REVIEW OF SYSTEMS  --------------------------------------------------------------------------------  states not doing well but is not able to give further ROS     VITALS/PHYSICAL EXAM  --------------------------------------------------------------------------------  T(C): 36.8 (09-13-18 @ 20:17), Max: 37 (09-12-18 @ 23:53)  HR: 114 (09-13-18 @ 20:17) (109 - 114)  BP: 128/77 (09-13-18 @ 20:17) (103/55 - 128/77)  RR: 18 (09-13-18 @ 20:17) (18 - 19)  SpO2: 94% (09-13-18 @ 20:17) (92% - 95%)  Wt(kg): --        09-12-18 @ 07:01  -  09-13-18 @ 07:00  --------------------------------------------------------  IN: 730 mL / OUT: 300 mL / NET: 430 mL    09-13-18 @ 07:01  -  09-13-18 @ 21:11  --------------------------------------------------------  IN: 600 mL / OUT: 1350 mL / NET: -750 mL      Physical Exam:  	  Gen:  comfortable appearing   	no jvd , supple neck,   	Pulm: decrease bs  no rales or ronchi or wheezing  	CV: RRR, S1S2; no rub  	Abd: +BS, soft, + ascites decreased   	: No suprapubic tenderness  	UE: Warm, no cyanosis  no clubbing,  no edema; no asterixis  	LE: Warm, no cyanosis  no clubbing, 2-3+ edema  			    LABS/STUDIES  --------------------------------------------------------------------------------              9.9    12.53 >-----------<  101      [09-13-18 @ 07:50]              28.7     132  |  92  |  13  ----------------------------<  126      [09-13-18 @ 19:28]  3.7   |  27  |  0.89        Ca     8.4     [09-13-18 @ 19:28]      Mg     1.9     [09-12-18 @ 06:05]    TPro  5.8  /  Alb  2.1  /  TBili  5.0  /  DBili  x   /  AST  138  /  ALT  62  /  AlkPhos  118  [09-12-18 @ 06:05]    PT/INR: PT 28.5 , INR 2.48       [09-13-18 @ 07:56]  PTT: 31.8       [09-13-18 @ 07:56]      Creatinine Trend:  SCr 0.89 [09-13 @ 19:28]  SCr 0.82 [09-13 @ 06:20]  SCr 0.86 [09-12 @ 06:05]  SCr 0.91 [09-11 @ 06:09]  SCr 0.83 [09-10 @ 07:21]              Urinalysis - [09-06-18 @ 01:50]      Color Brown / Appearance SL Turbid / SG 1.018 / pH 6.0      Gluc Negative / Ketone Negative  / Bili Moderate / Urobili 8       Blood Negative / Protein Trace / Leuk Est Negative / Nitrite Negative      RBC 0-2 / WBC 3-5 / Hyaline 0-2 / Gran  / Sq Epi  / Non Sq Epi OCC / Bacteria Few      Iron 67, TIBC 94, %sat 71      [09-08-18 @ 10:01]  Ferritin 1886      [09-08-18 @ 09:14]    LEANA: titer 1:160, pattern [id number]      [09-08-18 @ 09:14]

## 2018-09-13 NOTE — DIETITIAN INITIAL EVALUATION ADULT. - PROBLEM SELECTOR PLAN 5
Continue B12 and Folic acid  No signs of active bleeding, never had screening EGD for varices. Brown stool on rectal.

## 2018-09-13 NOTE — DIETITIAN INITIAL EVALUATION ADULT. - PROBLEM SELECTOR PLAN 7
High risk for W/D. CIWA is 0 at this time.  Place on CIWA calculation protocol, but received 25mg PO x 1 in ED.  Will start Librium 25mg Q8hrs with Ativan PRN CIWA > 8.   high risk for alcohol withdrawal.    #Dash diet, fluid restricted 1L  Keep mg >2 k >4  NO A/C    #LE edema  Check dopplers b/l, given cirrhosis is also prothrombotic.

## 2018-09-13 NOTE — PROVIDER CONTACT NOTE (OTHER) - BACKGROUND
admitted for increased LE swelling. pt daily drinker, for past >20 years. Ascites related to alcohol abuse.

## 2018-09-13 NOTE — DIETITIAN INITIAL EVALUATION ADULT. - ORAL INTAKE PTA
poor/pt with poor po intake PTA, ~1 meal/day. Pt was with EtOH abuse. NKFA reported. Pt was taking a MVI PTA.

## 2018-09-13 NOTE — PROGRESS NOTE ADULT - SUBJECTIVE AND OBJECTIVE BOX
[facility], Division of Infectious Diseases  GHAZALA Oliver A. Lee  [phone]    Name: HANDY EARLY  Age: 52y  Gender: Male  MRN: [id number]    Interval History--  Notes reviewed  lethargic    Past Medical History--  Atrial fibrillation  Liver disease due to alcohol  Dupuytren contracture      For details regarding the patient's social history, family history, and other miscellaneous elements, please refer the initial infectious diseases consultation and/or the admitting history and physical examination for this admission.    Allergies    No Known Allergies    Intolerances        Medications--  Antibiotics:  ampicillin/sulbactam  IVPB 1.5 Gram(s) IV Intermittent every 6 hours    Immunologic:  influenza   Vaccine 0.5 milliLiter(s) IntraMuscular once    Other:  chlordiazePOXIDE  cyanocobalamin  folic acid  furosemide   Injectable  potassium chloride    Tablet ER  spironolactone  thiamine      Review of Systems--  A 10-point review of systems was obtained.     unable to obtain  Review of systems otherwise negative except as previously noted.    Physical Examination--  Vital Signs: T(F): 98.5 (09-13-18 @ 12:00), Max: 98.6 (09-12-18 @ 23:53)  HR: 112 (09-13-18 @ 12:00)  BP: 112/73 (09-13-18 @ 12:00)  RR: 18 (09-13-18 @ 12:00)  SpO2: 93% (09-13-18 @ 12:00)  Wt(kg): --  General: Nontoxic-appearing Male in no acute distress.  HEENT: AT/NC.Neck: Not rigid. No sense of mass.  Nodes: None palpable.  Lungs: Clear bilaterally without rales, wheezing or rhonchi  Heart: Regular rate and rhythm. No Murmur. No rub. No gallop. No palpable thrill.  Abdomen: Bowel sounds present and normoactive. Soft. Nondistended. Nontender. No sense of mass. No organomegaly.  Back: No spinal tenderness. No costovertebral angle tenderness.   Extremities: No cyanosis or clubbing. No edema.   Skin: Warm. Dry. Good turgor. No rash. No vasculitic stigmata.  Psychiatric: lethargic and flat affect        Laboratory Studies--  CBC                        9.9    12.53 )-----------( 101      ( 13 Sep 2018 07:50 )             28.7       Chemistries  09-13    131<L>  |  90<L>  |  14  ----------------------------<  99  3.1<L>   |  28  |  0.82    Ca    7.9<L>      13 Sep 2018 06:20  Mg     1.9     09-12    TPro  5.8<L>  /  Alb  2.1<L>  /  TBili  5.0<H>  /  DBili  x   /  AST  138<H>  /  ALT  62<H>  /  AlkPhos  118  09-12      Culture Data    Culture - Fungal, Body Fluid (collected 10 Sep 2018 22:26)  Source: .Body Fluid Peritoneal Fluid  Preliminary Report (11 Sep 2018 07:11):    Testing in progress    Culture - Body Fluid with Gram Stain (collected 10 Sep 2018 22:26)  Source: .Body Fluid Peritoneal Fluid  Gram Stain (10 Sep 2018 23:36):    polymorphonuclear leukocytes seen    No organisms seen by cytocentrifuge  Preliminary Report (11 Sep 2018 17:14):    No growth    Culture - Acid Fast - Body Fluid w/Smear (collected 10 Sep 2018 22:26)  Source: .Body Fluid Peritoneal Fluid

## 2018-09-13 NOTE — PROGRESS NOTE ADULT - PROBLEM SELECTOR PLAN 4
Hypervolemic hyponatremia likely 2/2 ascites  Appreciate renal  Fluid restriction 1L  Strict I/Os  Daily weights

## 2018-09-13 NOTE — PROVIDER CONTACT NOTE (OTHER) - ASSESSMENT
pt lethargic/slow reacting to questions. pt denies headache or any nausea. flushed & clammy. VSS, accept tachycardic on tele.

## 2018-09-13 NOTE — PROGRESS NOTE ADULT - SUBJECTIVE AND OBJECTIVE BOX
Awake, alert.  Slow moving.  LE edema improving. Persistent erythema.    Vital Signs Last 24 Hrs  T(C): 36.9 (13 Sep 2018 12:00), Max: 37 (12 Sep 2018 23:53)  T(F): 98.5 (13 Sep 2018 12:00), Max: 98.6 (12 Sep 2018 23:53)  HR: 112 (13 Sep 2018 12:00) (109 - 120)  BP: 112/73 (13 Sep 2018 12:00) (103/55 - 126/77)  BP(mean): --  RR: 18 (13 Sep 2018 12:00) (18 - 19)  SpO2: 93% (13 Sep 2018 12:00) (92% - 95%)    PHYSICAL EXAM:    Constitutional: NAD, well-developed  Neck: No LAD, supple  Respiratory: CTA and P  Cardiovascular: S1 and S2, RRR, no M  Gastrointestinal: BS+, soft, ND  Extremities: ++ peripheral edema, neg clubing, cyanosis  Vascular: 2+ peripheral pulses      MEDICATIONS  (STANDING):  ampicillin/sulbactam  IVPB 1.5 Gram(s) IV Intermittent every 6 hours  chlordiazePOXIDE 25 milliGRAM(s) Oral every 12 hours  folic acid 1 milliGRAM(s) Oral daily  furosemide   Injectable 40 milliGRAM(s) IV Push two times a day  influenza   Vaccine 0.5 milliLiter(s) IntraMuscular once  spironolactone 50 milliGRAM(s) Oral daily  thiamine 100 milliGRAM(s) Oral daily    MEDICATIONS  (PRN):  LORazepam   Injectable 2 milliGRAM(s) IntraMuscular every 4 hours PRN CIWA > 8      Allergies    No Known Allergies    Intolerances        LABS:                        9.9    12.53 )-----------( 101      ( 13 Sep 2018 07:50 )             28.7     09-13    131<L>  |  90<L>  |  14  ----------------------------<  99  3.1<L>   |  28  |  0.82    Ca    7.9<L>      13 Sep 2018 06:20  Mg     1.9     09-12    TPro  5.8<L>  /  Alb  2.1<L>  /  TBili  5.0<H>  /  DBili  x   /  AST  138<H>  /  ALT  62<H>  /  AlkPhos  118  09-12    LIVER FUNCTIONS - ( 12 Sep 2018 06:05 )  Alb: 2.1 g/dL / Pro: 5.8 g/dL / ALK PHOS: 118 U/L / ALT: 62 U/L / AST: 138 U/L / GGT: x           PT/INR - ( 13 Sep 2018 07:56 )   PT: 28.5 sec;   INR: 2.48 ratio         PTT - ( 13 Sep 2018 07:56 )  PTT:31.8 sec                  RADIOLOGY & ADDITIONAL TESTS:

## 2018-09-13 NOTE — PROGRESS NOTE ADULT - ASSESSMENT
52m with afib, + etoh and cirrhosis  here with edema and erythema-- r/o cellulitis  leukocytosis-- stable

## 2018-09-13 NOTE — DIETITIAN INITIAL EVALUATION ADULT. - PROBLEM SELECTOR PLAN 1
Start Ceftraixone( Would prefer to wait for paracentesis however pt was hypotensive on arrival and has severe sepsis) to cover for SBP and strep cellulitis of leg (low suspicion).   Will stop with IVF given his total body overload should be careful with fluids  Repeat lactate now  Check blood cultures, urine culture, Urinalysis and Chest xray.  Lactate unlikely to clear given underlying liver disease.

## 2018-09-13 NOTE — CHART NOTE - NSCHARTNOTEFT_GEN_A_CORE
Notified by RN that patient's CIWA score is 5, generalized shaking noted per RN. seen and examined the patient. Patient lying in bed, in no distress. No tremors noted.  Patient states that he felt much better after he drank water. patient c/o being fatigued and difficulty in ambulation since admission. no acute focal defecits noted. Repeat CIWA score 1.    Vital Signs Last 24 Hrs  T(C): 36.9 (13 Sep 2018 03:50), Max: 37 (12 Sep 2018 23:53)  T(F): 98.5 (13 Sep 2018 03:50), Max: 98.6 (12 Sep 2018 23:53)  HR: 109 (13 Sep 2018 03:48) (109 - 120)  BP: 119/69 (13 Sep 2018 03:48) (100/54 - 126/77)  BP(mean): --  RR: 19 (13 Sep 2018 03:48) (18 - 19)  SpO2: 92% (13 Sep 2018 03:48) (90% - 95%)    52-yo Male with PMHx of Afib on Xarelto, Alcoholic hepatitis presents with severe sepsis and ascites. Patient on CIWA with librium taper.    Continue Librium taper  NO need for ativan now  patient with no alcohol withdrawal symptoms now  continue CIWA monitoring  F/S stat  Continue ABX  PT eval in am  Will follow closely  Will update with primary team    Deanna Martinez [facility] [place]  [id number]

## 2018-09-13 NOTE — DIETITIAN INITIAL EVALUATION ADULT. - PROBLEM SELECTOR PLAN 6
Currently regular on exam, check EKG  Will hold Xarelto in setting of recurrent nosebleeds and INR >5.  Holding Metoprolol in setting of severe sepsis and hypotension.

## 2018-09-13 NOTE — DIETITIAN INITIAL EVALUATION ADULT. - NS AS NUTRI INTERV MEALS SNACK
continue with current therapeutic diet: DASH/TLC. Continue to encourage good po intake at meals. Pt on fluid restriction as well./General/healthful diet

## 2018-09-13 NOTE — PROGRESS NOTE ADULT - SUBJECTIVE AND OBJECTIVE BOX
Patient is a 52y old  Male who presents with a chief complaint of Abdominal swelling (13 Sep 2018 13:33)      SUBJECTIVE / OVERNIGHT EVENTS:  a little more active, but still slow speech.  the gf at bedside.  overnight agitated. now calm.  no cp, no sob, no n/v/d. no abdominal pain.  no headache, no dizziness.   leg erythema much improved.       Vital Signs Last 24 Hrs  T(C): 36.9 (13 Sep 2018 12:00), Max: 37 (12 Sep 2018 23:53)  T(F): 98.5 (13 Sep 2018 12:00), Max: 98.6 (12 Sep 2018 23:53)  HR: 113 (13 Sep 2018 18:01) (109 - 115)  BP: 114/63 (13 Sep 2018 18:01) (103/55 - 119/69)  BP(mean): --  RR: 18 (13 Sep 2018 18:01) (18 - 19)  SpO2: 95% (13 Sep 2018 18:01) (92% - 95%)  I&O's Summary    12 Sep 2018 07:01  -  13 Sep 2018 07:00  --------------------------------------------------------  IN: 730 mL / OUT: 300 mL / NET: 430 mL    13 Sep 2018 07:01  -  13 Sep 2018 19:45  --------------------------------------------------------  IN: 500 mL / OUT: 1100 mL / NET: -600 mL        PHYSICAL EXAM:  GENERAL: NAD  HEAD:  Atraumatic, Normocephalic  EYES: EOMI, mild (+)icterus  NECK: Supple, No JVD, No LAD  CHEST/LUNG: mild dec breath sounds bilaterally; No wheeze  HEART: Regular rate and rhythm; No murmurs, rubs, or gallops  ABDOMEN: Markedly distended without focal tenderness  EXTREMITIES:  2+ Peripheral Pulses, mild tremors in hands when elbow extended.   SKIN: Anasarca with beefy red skin rash that is warm to touch, confluent, non blanching, non purulent. redness improved today. less erythematous  NEURO:  AAOx3, nonfocal CN/motor/sensory/reflexes      LABS:                        9.9    12.53 )-----------( 101      ( 13 Sep 2018 07:50 )             28.7     09-13    131<L>  |  90<L>  |  14  ----------------------------<  99  3.1<L>   |  28  |  0.82    Ca    7.9<L>      13 Sep 2018 06:20  Mg     1.9     09-12    TPro  5.8<L>  /  Alb  2.1<L>  /  TBili  5.0<H>  /  DBili  x   /  AST  138<H>  /  ALT  62<H>  /  AlkPhos  118  09-12    PT/INR - ( 13 Sep 2018 07:56 )   PT: 28.5 sec;   INR: 2.48 ratio         PTT - ( 13 Sep 2018 07:56 )  PTT:31.8 sec  CAPILLARY BLOOD GLUCOSE      POCT Blood Glucose.: 103 mg/dL (13 Sep 2018 04:32)            RADIOLOGY & ADDITIONAL TESTS:    Imaging Personally Reviewed:  [x] YES  [ ] NO    Consultant(s) Notes Reviewed:  [x] YES  [ ] NO      MEDICATIONS  (STANDING):  ampicillin/sulbactam  IVPB 1.5 Gram(s) IV Intermittent every 6 hours  chlordiazePOXIDE 25 milliGRAM(s) Oral every 12 hours  cyanocobalamin 500 MICROGram(s) Oral daily  folic acid 1 milliGRAM(s) Oral daily  furosemide   Injectable 40 milliGRAM(s) IV Push two times a day  influenza   Vaccine 0.5 milliLiter(s) IntraMuscular once  rifaximin 550 milliGRAM(s) Oral two times a day  spironolactone 100 milliGRAM(s) Oral daily  thiamine 100 milliGRAM(s) Oral daily    MEDICATIONS  (PRN):      Care Discussed with Consultants/Other Providers [x] YES  [ ] NO    HEALTH ISSUES - PROBLEM Dx:  Erythema of lower extremity: Erythema of lower extremity  SBP (spontaneous bacterial peritonitis): SBP (spontaneous bacterial peritonitis)  Swelling of lower limb: Swelling of lower limb  Suspected deep vein thrombosis  Alcohol abuse: Alcohol abuse  Afib: Afib  Macrocytic anemia: Macrocytic anemia  Alcoholic hepatitis: Alcoholic hepatitis  Ascites: Ascites  Hyponatremia: Hyponatremia  Severe sepsis: Severe sepsis

## 2018-09-13 NOTE — PROGRESS NOTE ADULT - ASSESSMENT
Alcoholic Hepatitis  Decompensated Cirrhosis, presumed alcoholic, complicated by ascites, r/o SBP  Hyponatremia    Rec:  Dx paracentesis (on abx) without elevated cell count.  Continue Lasix 40 IV BID  Spironolactone increased to 100 qD  Xifaxan added.  Abx per ID

## 2018-09-13 NOTE — PROGRESS NOTE ADULT - ASSESSMENT
52M pmhx of Afib on xarelto, alcoholic hepatitis with cirrhosis and ascities with severe hyponatremia and fluid overloaded    1- hyponatremia  2- ascites  3- alcoholism     na increasing with improving volume status   k low increase aldactone 100 mg daily   lasix  40 mg iv bid weight not improving but overall edema is improving   One liter fluid restriction to cont   CIWA protocol   thiamine

## 2018-09-13 NOTE — DIETITIAN INITIAL EVALUATION ADULT. - PROBLEM SELECTOR PLAN 3
Diagnostic paracentesis before therapeutic paracentesis for ascites 2/2 alcoholic hepatitis.  Ordered Paracentesis

## 2018-09-13 NOTE — DIETITIAN INITIAL EVALUATION ADULT. - ENERGY NEEDS
Height: 68 inches, Weight: 184 pounds (9/13, standing)  BMI: 28.0 kg/m2 IBW: 154 pounds (+/-10%), %IBW: 119%  Pertinent Info: 3+ edema to R/L legs, no pressure ulcers noted at this time.   Other pertinent info: 52yoM admitted with alcoholic hepatitis, cirrhosis, and ascites. Now s/p paracentesis 9/10. Severe sepsis on admit resolved. Pt with macrocytic anemia and hyponatremia. Erythema of lower extremity. Pt not a transplant candidate given active alcohol use. Hypokalemia noted now being repleted. Pt at high risk for alcohol withdrawal.

## 2018-09-13 NOTE — DIETITIAN INITIAL EVALUATION ADULT. - NUTRITION INTERVENTION
Medical Food Supplements/Nutrition Education/Collaboration and Referral of Nutrition Care/Meals and Snack/Vitamin

## 2018-09-13 NOTE — DIETITIAN INITIAL EVALUATION ADULT. - NS AS NUTRI INTERV COLLABORAT
Discussed nutrition supplement and vitamins with team. Discussed nutrition supplement and vitamins with NP.

## 2018-09-14 LAB
ANION GAP SERPL CALC-SCNC: 9 MMOL/L — SIGNIFICANT CHANGE UP (ref 5–17)
BUN SERPL-MCNC: 12 MG/DL — SIGNIFICANT CHANGE UP (ref 7–23)
CALCIUM SERPL-MCNC: 8.4 MG/DL — SIGNIFICANT CHANGE UP (ref 8.4–10.5)
CHLORIDE SERPL-SCNC: 91 MMOL/L — LOW (ref 96–108)
CO2 SERPL-SCNC: 28 MMOL/L — SIGNIFICANT CHANGE UP (ref 22–31)
CREAT SERPL-MCNC: 0.97 MG/DL — SIGNIFICANT CHANGE UP (ref 0.5–1.3)
GLUCOSE SERPL-MCNC: 107 MG/DL — HIGH (ref 70–99)
HCT VFR BLD CALC: 31.4 % — LOW (ref 39–50)
HGB BLD-MCNC: 10.3 G/DL — LOW (ref 13–17)
MAGNESIUM SERPL-MCNC: 2 MG/DL — SIGNIFICANT CHANGE UP (ref 1.6–2.6)
MCHC RBC-ENTMCNC: 32.8 GM/DL — SIGNIFICANT CHANGE UP (ref 32–36)
MCHC RBC-ENTMCNC: 34.7 PG — HIGH (ref 27–34)
MCV RBC AUTO: 105.7 FL — HIGH (ref 80–100)
PLATELET # BLD AUTO: 113 K/UL — LOW (ref 150–400)
POTASSIUM SERPL-MCNC: 3.7 MMOL/L — SIGNIFICANT CHANGE UP (ref 3.5–5.3)
POTASSIUM SERPL-SCNC: 3.7 MMOL/L — SIGNIFICANT CHANGE UP (ref 3.5–5.3)
RBC # BLD: 2.97 M/UL — LOW (ref 4.2–5.8)
RBC # FLD: 15.4 % — HIGH (ref 10.3–14.5)
SODIUM SERPL-SCNC: 128 MMOL/L — LOW (ref 135–145)
WBC # BLD: 16.68 K/UL — HIGH (ref 3.8–10.5)
WBC # FLD AUTO: 16.68 K/UL — HIGH (ref 3.8–10.5)

## 2018-09-14 RX ORDER — METOPROLOL TARTRATE 50 MG
25 TABLET ORAL
Qty: 0 | Refills: 0 | Status: DISCONTINUED | OUTPATIENT
Start: 2018-09-14 | End: 2018-09-17

## 2018-09-14 RX ADMIN — SPIRONOLACTONE 100 MILLIGRAM(S): 25 TABLET, FILM COATED ORAL at 05:36

## 2018-09-14 RX ADMIN — Medication 40 MILLIGRAM(S): at 05:36

## 2018-09-14 RX ADMIN — Medication 1 MILLIGRAM(S): at 12:06

## 2018-09-14 RX ADMIN — Medication 40 MILLIGRAM(S): at 18:37

## 2018-09-14 RX ADMIN — PREGABALIN 500 MICROGRAM(S): 225 CAPSULE ORAL at 12:06

## 2018-09-14 RX ADMIN — AMPICILLIN SODIUM AND SULBACTAM SODIUM 100 GRAM(S): 250; 125 INJECTION, POWDER, FOR SUSPENSION INTRAMUSCULAR; INTRAVENOUS at 23:03

## 2018-09-14 RX ADMIN — Medication 25 MILLIGRAM(S): at 12:05

## 2018-09-14 RX ADMIN — AMPICILLIN SODIUM AND SULBACTAM SODIUM 100 GRAM(S): 250; 125 INJECTION, POWDER, FOR SUSPENSION INTRAMUSCULAR; INTRAVENOUS at 18:37

## 2018-09-14 RX ADMIN — AMPICILLIN SODIUM AND SULBACTAM SODIUM 100 GRAM(S): 250; 125 INJECTION, POWDER, FOR SUSPENSION INTRAMUSCULAR; INTRAVENOUS at 12:05

## 2018-09-14 RX ADMIN — AMPICILLIN SODIUM AND SULBACTAM SODIUM 100 GRAM(S): 250; 125 INJECTION, POWDER, FOR SUSPENSION INTRAMUSCULAR; INTRAVENOUS at 00:45

## 2018-09-14 RX ADMIN — Medication 100 MILLIGRAM(S): at 12:05

## 2018-09-14 RX ADMIN — Medication 25 MILLIGRAM(S): at 23:03

## 2018-09-14 RX ADMIN — AMPICILLIN SODIUM AND SULBACTAM SODIUM 100 GRAM(S): 250; 125 INJECTION, POWDER, FOR SUSPENSION INTRAMUSCULAR; INTRAVENOUS at 05:36

## 2018-09-14 NOTE — PROGRESS NOTE ADULT - ASSESSMENT
Alcoholic Hepatitis  Decompensated Cirrhosis, presumed alcoholic, complicated by ascites, r/o SBP  Hyponatremia    Rec:  Dx paracentesis (on abx) without elevated cell count.  Continue Lasix 40 IV BID  Continue aldactone to 100 qD  Continue xifaxan  Abx per ID

## 2018-09-14 NOTE — PHYSICAL THERAPY INITIAL EVALUATION ADULT - BALANCE TRAINING, PT EVAL
GOAL: Pt will demonstrate improved static/dynamic standing balance to good, in order to improve stability, decrease fall risk and increase independence with transfers and ambulation within 4 weeks.

## 2018-09-14 NOTE — PROGRESS NOTE ADULT - ASSESSMENT
52m with afib, + etoh and cirrhosis  here with edema and erythema-- r/o cellulitis  leukocytosis-- stable  Changes at this point look mostly like venous insufficiency though on R side there is some possibility of an enlarged LN in the thigh and rash potentially related to lymphangitis (though could be prostraumatic in setting opf thrombocytopenia)

## 2018-09-14 NOTE — PHYSICAL THERAPY INITIAL EVALUATION ADULT - CRITERIA FOR SKILLED THERAPEUTIC INTERVENTIONS
therapy frequency/impairments found/predicted duration of therapy intervention/rehab potential/risk reduction/prevention/functional limitations in following categories/anticipated discharge recommendation/anticipated equipment needs at discharge

## 2018-09-14 NOTE — PHYSICAL THERAPY INITIAL EVALUATION ADULT - LIVES WITH, PROFILE
Pt lives with his girlfriend in private home with 1 stair to enter, independent in functional mobility prior to admission./significant other significant other/Pt lives with his girlfriend in private home with 1 stair to enter, independent in functional mobility without AD prior to admission.

## 2018-09-14 NOTE — PROGRESS NOTE ADULT - SUBJECTIVE AND OBJECTIVE BOX
Patient is a 52y old  Male who presents with a chief complaint of Abdominal swelling (14 Sep 2018 11:35)      SUBJECTIVE / OVERNIGHT EVENTS:  unchanged.  retaining urine. straight cath done.  monitoring for urinary retention.  the gf at bedside.   pt feel okay.  still slow to speech.  denied pain.       Vital Signs Last 24 Hrs  T(C): 37.7 (14 Sep 2018 18:32), Max: 37.7 (14 Sep 2018 18:32)  T(F): 99.9 (14 Sep 2018 18:32), Max: 99.9 (14 Sep 2018 18:32)  HR: 113 (14 Sep 2018 18:32) (102 - 114)  BP: 115/69 (14 Sep 2018 18:32) (109/66 - 128/77)  BP(mean): --  RR: 17 (14 Sep 2018 18:32) (17 - 18)  SpO2: 93% (14 Sep 2018 18:32) (91% - 96%)  I&O's Summary    13 Sep 2018 07:01  -  14 Sep 2018 07:00  --------------------------------------------------------  IN: 600 mL / OUT: 2350 mL / NET: -1750 mL    14 Sep 2018 07:01  -  14 Sep 2018 20:08  --------------------------------------------------------  IN: 480 mL / OUT: 625 mL / NET: -145 mL        PHYSICAL EXAM:  GENERAL: NAD  HEAD:  Atraumatic, Normocephalic  EYES: EOMI, mild (+)icterus  NECK: Supple, No JVD, No LAD  CHEST/LUNG: mild dec breath sounds bilaterally; No wheeze  HEART: Regular rate and rhythm; No murmurs, rubs, or gallops  ABDOMEN: Markedly distended without focal tenderness  EXTREMITIES:  2+ Peripheral Pulses, mild tremors in hands when elbow extended.   SKIN: Anasarca with beefy red skin rash that is previously warm to touch, but now dissipating. almost normal skin color now, except mild erythema. resolved edema.   NEURO:  AAOx3, nonfocal CN/motor/sensory/reflexes, minimal tremors of upper extremities.       LABS:                        10.3   16.68 )-----------( 113      ( 14 Sep 2018 09:41 )             31.4     09-14    128<L>  |  91<L>  |  12  ----------------------------<  107<H>  3.7   |  28  |  0.97    Ca    8.4      14 Sep 2018 05:58  Mg     2.0     09-14      PT/INR - ( 13 Sep 2018 07:56 )   PT: 28.5 sec;   INR: 2.48 ratio         PTT - ( 13 Sep 2018 07:56 )  PTT:31.8 sec  CAPILLARY BLOOD GLUCOSE                RADIOLOGY & ADDITIONAL TESTS:    Imaging Personally Reviewed:  [x] YES  [ ] NO    Consultant(s) Notes Reviewed:  [x] YES  [ ] NO      MEDICATIONS  (STANDING):  ampicillin/sulbactam  IVPB 1.5 Gram(s) IV Intermittent every 6 hours  chlordiazePOXIDE 25 milliGRAM(s) Oral every 12 hours  cyanocobalamin 500 MICROGram(s) Oral daily  folic acid 1 milliGRAM(s) Oral daily  furosemide   Injectable 40 milliGRAM(s) IV Push two times a day  influenza   Vaccine 0.5 milliLiter(s) IntraMuscular once  metoprolol tartrate 25 milliGRAM(s) Oral two times a day  rifaximin 550 milliGRAM(s) Oral two times a day  spironolactone 100 milliGRAM(s) Oral daily  thiamine 100 milliGRAM(s) Oral daily    MEDICATIONS  (PRN):      Care Discussed with Consultants/Other Providers [x] YES  [ ] NO    HEALTH ISSUES - PROBLEM Dx:  Erythema of lower extremity: Erythema of lower extremity  SBP (spontaneous bacterial peritonitis): SBP (spontaneous bacterial peritonitis)  Swelling of lower limb: Swelling of lower limb  Suspected deep vein thrombosis  Alcohol abuse: Alcohol abuse  Afib: Afib  Macrocytic anemia: Macrocytic anemia  Alcoholic hepatitis: Alcoholic hepatitis  Ascites: Ascites  Hyponatremia: Hyponatremia  Severe sepsis: Severe sepsis

## 2018-09-14 NOTE — PHYSICAL THERAPY INITIAL EVALUATION ADULT - PERTINENT HX OF CURRENT PROBLEM, REHAB EVAL
52M pmhx of Afib on xarelto, alcoholic hepatitis with cirrhosis and ascities who presents from outpt office for evaluation of worsening LE edema. A/w with severe sepsis and ascites,s/p paracentesis

## 2018-09-14 NOTE — PROGRESS NOTE ADULT - SUBJECTIVE AND OBJECTIVE BOX
[facility], Division of Infectious Diseases  GHAZALA Oliver A. Lee  [phone]    Name: HANDY EARLY  Age: 52y  Gender: Male  MRN: [id number]    Interval History--  Notes reviewed. Slightly confused. Asking for a cream to help with itching on his legs. No other complaints.    Past Medical History--  Atrial fibrillation  Liver disease due to alcohol  Dupuytren contracture      For details regarding the patient's social history, family history, and other miscellaneous elements, please refer the initial infectious diseases consultation and/or the admitting history and physical examination for this admission.    Allergies    No Known Allergies    Intolerances        Medications--  Antibiotics:  ampicillin/sulbactam  IVPB 1.5 Gram(s) IV Intermittent every 6 hours  rifaximin 550 milliGRAM(s) Oral two times a day    Immunologic:  influenza   Vaccine 0.5 milliLiter(s) IntraMuscular once    Other:  chlordiazePOXIDE  cyanocobalamin  folic acid  furosemide   Injectable  spironolactone  thiamine      Review of Systems--  Does not appear to be reliable at this time.    Physical Examination--  Vital Signs: T(F): 98.6 (09-14-18 @ 04:48), Max: 99 (09-14-18 @ 02:57)  HR: 105 (09-14-18 @ 04:48)  BP: 114/66 (09-14-18 @ 04:48)  RR: 18 (09-14-18 @ 04:48)  SpO2: 94% (09-14-18 @ 04:48)  Wt(kg): --  General: Nontoxic-appearing Male in no acute distress. Does not appear to be compliant with fluid restriction (near empty soda, half-full water pitcher on bedside table).   HEENT: AT/NC.  Anicteric. Conjunctiva pink and moist. Oropharynx clear.   Neck: Not rigid. No sense of mass.  Nodes: None palpable.  Lungs: diminished breath sounds bilaterally without rales, wheezing or rhonchi  Heart: Irreg Irreg. No Murmur. No rub. No gallop. No palpable thrill.  Abdomen: Bowel sounds present and normoactive. Soft. Modestly distended. Dull. Nontender. No sense of mass. No organomegaly.  Back: No spinal tenderness. No costovertebral angle tenderness.   Extremities: No cyanosis or clubbing. 2-3+ edema with distal symmetric LE partially blanching erythema. On R leg some petechial rash anteriorly mid thigh, and some ?swelling medially thigh. All areas NT.   Skin: Warm. Dry. Good turgor. No rash. No vasculitic stigmata.  Psychiatric: Slow mentation, slightly confused.      Laboratory Studies--  CBC                        10.3   16.68 )-----------( 113      ( 14 Sep 2018 09:41 )             31.4       Chemistries  09-14    128<L>  |  91<L>  |  12  ----------------------------<  107<H>  3.7   |  28  |  0.97    Ca    8.4      14 Sep 2018 05:58  Mg     2.0     09-14        Culture Data    Culture - Fungal, Body Fluid (collected 10 Sep 2018 22:26)  Source: .Body Fluid Peritoneal Fluid  Preliminary Report (11 Sep 2018 07:11):    Testing in progress    Culture - Body Fluid with Gram Stain (collected 10 Sep 2018 22:26)  Source: .Body Fluid Peritoneal Fluid  Gram Stain (10 Sep 2018 23:36):    polymorphonuclear leukocytes seen    No organisms seen by cytocentrifuge  Preliminary Report (11 Sep 2018 17:14):    No growth    Culture - Acid Fast - Body Fluid w/Smear (collected 10 Sep 2018 22:26)  Source: .Body Fluid Peritoneal Fluid

## 2018-09-14 NOTE — PROGRESS NOTE ADULT - SUBJECTIVE AND OBJECTIVE BOX
Awake, alert.  Mild tremor.  Still slow moving but improved from yesterday.  LE edema improving. Persistent erythema.    Vital Signs Last 24 Hrs  T(C): 37 (14 Sep 2018 04:48), Max: 37.2 (14 Sep 2018 02:57)  T(F): 98.6 (14 Sep 2018 04:48), Max: 99 (14 Sep 2018 02:57)  HR: 105 (14 Sep 2018 04:48) (105 - 114)  BP: 114/66 (14 Sep 2018 04:48) (112/73 - 128/77)  BP(mean): --  RR: 18 (14 Sep 2018 04:48) (18 - 18)  SpO2: 94% (14 Sep 2018 04:48) (93% - 96%)    PHYSICAL EXAM:    Constitutional: NAD, well-developed  Neck: No LAD, supple  Respiratory: CTA and P  Cardiovascular: S1 and S2, RRR, no M  Gastrointestinal: BS+, soft, ND  Extremities: ++ peripheral edema, neg clubing, cyanosis  Vascular: 2+ peripheral pulses      MEDICATIONS  (STANDING):  ampicillin/sulbactam  IVPB 1.5 Gram(s) IV Intermittent every 6 hours  chlordiazePOXIDE 25 milliGRAM(s) Oral every 12 hours  folic acid 1 milliGRAM(s) Oral daily  furosemide   Injectable 40 milliGRAM(s) IV Push two times a day  influenza   Vaccine 0.5 milliLiter(s) IntraMuscular once  spironolactone 50 milliGRAM(s) Oral daily  thiamine 100 milliGRAM(s) Oral daily    MEDICATIONS  (PRN):  LORazepam   Injectable 2 milliGRAM(s) IntraMuscular every 4 hours PRN CIWA > 8      Allergies    No Known Allergies    Intolerances          LABS:                        10.3   16.68 )-----------( 113      ( 14 Sep 2018 09:41 )             31.4     09-14    128<L>  |  91<L>  |  12  ----------------------------<  107<H>  3.7   |  28  |  0.97    Ca    8.4      14 Sep 2018 05:58  Mg     2.0     09-14        PT/INR - ( 13 Sep 2018 07:56 )   PT: 28.5 sec;   INR: 2.48 ratio         PTT - ( 13 Sep 2018 07:56 )  PTT:31.8 sec                    RADIOLOGY & ADDITIONAL TESTS:

## 2018-09-15 LAB
ANION GAP SERPL CALC-SCNC: 9 MMOL/L — SIGNIFICANT CHANGE UP (ref 5–17)
BUN SERPL-MCNC: 12 MG/DL — SIGNIFICANT CHANGE UP (ref 7–23)
CALCIUM SERPL-MCNC: 8.4 MG/DL — SIGNIFICANT CHANGE UP (ref 8.4–10.5)
CHLORIDE SERPL-SCNC: 89 MMOL/L — LOW (ref 96–108)
CO2 SERPL-SCNC: 29 MMOL/L — SIGNIFICANT CHANGE UP (ref 22–31)
CREAT SERPL-MCNC: 1.01 MG/DL — SIGNIFICANT CHANGE UP (ref 0.5–1.3)
CULTURE RESULTS: SIGNIFICANT CHANGE UP
GLUCOSE SERPL-MCNC: 111 MG/DL — HIGH (ref 70–99)
HCT VFR BLD CALC: 31 % — LOW (ref 39–50)
HGB BLD-MCNC: 10.7 G/DL — LOW (ref 13–17)
MCHC RBC-ENTMCNC: 34.5 GM/DL — SIGNIFICANT CHANGE UP (ref 32–36)
MCHC RBC-ENTMCNC: 36.9 PG — HIGH (ref 27–34)
MCV RBC AUTO: 106.9 FL — HIGH (ref 80–100)
PLATELET # BLD AUTO: 111 K/UL — LOW (ref 150–400)
POTASSIUM SERPL-MCNC: 3.5 MMOL/L — SIGNIFICANT CHANGE UP (ref 3.5–5.3)
POTASSIUM SERPL-SCNC: 3.5 MMOL/L — SIGNIFICANT CHANGE UP (ref 3.5–5.3)
RBC # BLD: 2.9 M/UL — LOW (ref 4.2–5.8)
RBC # FLD: 14.9 % — HIGH (ref 10.3–14.5)
SODIUM SERPL-SCNC: 127 MMOL/L — LOW (ref 135–145)
SPECIMEN SOURCE: SIGNIFICANT CHANGE UP
WBC # BLD: 14.2 K/UL — HIGH (ref 3.8–10.5)
WBC # FLD AUTO: 14.2 K/UL — HIGH (ref 3.8–10.5)

## 2018-09-15 RX ADMIN — Medication 40 MILLIGRAM(S): at 17:04

## 2018-09-15 RX ADMIN — Medication 25 MILLIGRAM(S): at 21:35

## 2018-09-15 RX ADMIN — AMPICILLIN SODIUM AND SULBACTAM SODIUM 100 GRAM(S): 250; 125 INJECTION, POWDER, FOR SUSPENSION INTRAMUSCULAR; INTRAVENOUS at 05:56

## 2018-09-15 RX ADMIN — Medication 1 MILLIGRAM(S): at 10:11

## 2018-09-15 RX ADMIN — Medication 25 MILLIGRAM(S): at 10:12

## 2018-09-15 RX ADMIN — SPIRONOLACTONE 100 MILLIGRAM(S): 25 TABLET, FILM COATED ORAL at 05:56

## 2018-09-15 RX ADMIN — PREGABALIN 500 MICROGRAM(S): 225 CAPSULE ORAL at 10:11

## 2018-09-15 RX ADMIN — Medication 40 MILLIGRAM(S): at 05:56

## 2018-09-15 RX ADMIN — Medication 25 MILLIGRAM(S): at 05:56

## 2018-09-15 RX ADMIN — Medication 100 MILLIGRAM(S): at 10:11

## 2018-09-15 NOTE — PROGRESS NOTE ADULT - SUBJECTIVE AND OBJECTIVE BOX
In bed  Arousable and communicative.   Answers some questions.          Review of Systems:    General:  No wt loss, fevers, chills, night sweats,fatigue,   CV:  No pain, palpitatioins, hypo/hypertension  Resp:  No dyspnea, cough, tachypnea, wheezing  GI:  No pain, No nausea, No vomiting, No diarrhea, No constipatiion, No weight loss, No fever, No pruritis, No rectal bleeding, No tarry stools, No dysphagia,  :  No pain, bleeding, incontinence, nocturia  Muscle:  No pain, weakness      Vital Signs Last 24 Hrs  T(C): 36.7 (15 Sep 2018 04:15), Max: 37.7 (14 Sep 2018 18:32)  T(F): 98.1 (15 Sep 2018 04:15), Max: 99.9 (14 Sep 2018 18:32)  HR: 109 (15 Sep 2018 04:15) (102 - 118)  BP: 108/68 (15 Sep 2018 04:15) (108/68 - 118/69)  BP(mean): --  RR: 18 (15 Sep 2018 04:15) (16 - 18)  SpO2: 90% (15 Sep 2018 04:15) (90% - 93%)    PHYSICAL EXAM:    Constitutional: NAD, well-developed  Neck: No LAD, supple  Respiratory: CTA and P  Cardiovascular: S1 and S2, RRR, no M  Gastrointestinal: BS+, soft, ++ Ascites,  non tender  Extremities: No peripheral edema, neg clubing, cyanosis      MEDICATIONS  (STANDING):  chlordiazePOXIDE 25 milliGRAM(s) Oral every 12 hours  cyanocobalamin 500 MICROGram(s) Oral daily  folic acid 1 milliGRAM(s) Oral daily  furosemide   Injectable 40 milliGRAM(s) IV Push two times a day  influenza   Vaccine 0.5 milliLiter(s) IntraMuscular once  metoprolol tartrate 25 milliGRAM(s) Oral two times a day  rifaximin 550 milliGRAM(s) Oral two times a day  spironolactone 100 milliGRAM(s) Oral daily  thiamine 100 milliGRAM(s) Oral daily    MEDICATIONS  (PRN):      Allergies    No Known Allergies    Intolerances          LABS:                        10.7   14.20 )-----------( 111      ( 15 Sep 2018 07:03 )             31.0                         10.3   16.68 )-----------( 113      ( 14 Sep 2018 09:41 )             31.4                         9.9    12.53 )-----------( 101      ( 13 Sep 2018 07:50 )             28.7     09-15    127<L>  |  89<L>  |  12  ----------------------------<  111<H>  3.5   |  29  |  1.01    Ca    8.4      15 Sep 2018 06:15  Mg     2.0     09-14                RADIOLOGY & ADDITIONAL TESTS:

## 2018-09-15 NOTE — PROVIDER CONTACT NOTE (OTHER) - ASSESSMENT
Patient is A&Ox3-4, asymptomatic no s/sx acute respiratory distress, no nasal flaring, sternal retractions, use of accessory muscles. VSS. 2L NC given to patient. As per patient and significant other at bedside, patient has hx of ARYAN, uses Cpap machine at home.

## 2018-09-15 NOTE — PROGRESS NOTE ADULT - SUBJECTIVE AND OBJECTIVE BOX
[facility]   [phone]  Dr. Mccrary (covering for Dr. Alejandra)  RENAL FOLLOW UP NOTE  --------------------------------------------------------------------------------  Patient seen and examined.  More somnolent today as per girlfrienbrianna at bedside.  Patient arousable, and he states that he did not sleep well last night due to having "the runs".    PAST HISTORY  --------------------------------------------------------------------------------  No significant changes to PMH, PSH, FHx, SHx, unless otherwise noted    ALLERGIES & MEDICATIONS  --------------------------------------------------------------------------------  Allergies    No Known Allergies    Intolerances      Standing Inpatient Medications  chlordiazePOXIDE 25 milliGRAM(s) Oral every 12 hours  cyanocobalamin 500 MICROGram(s) Oral daily  folic acid 1 milliGRAM(s) Oral daily  furosemide   Injectable 40 milliGRAM(s) IV Push two times a day  influenza   Vaccine 0.5 milliLiter(s) IntraMuscular once  metoprolol tartrate 25 milliGRAM(s) Oral two times a day  rifaximin 550 milliGRAM(s) Oral two times a day  spironolactone 100 milliGRAM(s) Oral daily  thiamine 100 milliGRAM(s) Oral daily    PRN Inpatient Medications      FOCUSED REVIEW OF SYSTEMS  --------------------------------------------------------------------------------  +fatigue/somnolence  denies CP/palpitations  denies SOB/cough  denies N/V. +decreased appetite.  +abdominal distention  +urinary retention      VITALS/PHYSICAL EXAM  --------------------------------------------------------------------------------  T(C): 37.1 (09-15-18 @ 12:10), Max: 37.7 (09-14-18 @ 18:32)  HR: 95 (09-15-18 @ 12:43) (91 - 118)  BP: 102/58 (09-15-18 @ 12:43) (95/51 - 118/69)  RR: 17 (09-15-18 @ 12:10) (16 - 18)  SpO2: 91% (09-15-18 @ 12:10) (90% - 93%)  Wt(kg): --        09-14-18 @ 07:01  -  09-15-18 @ 07:00  --------------------------------------------------------  IN: 480 mL / OUT: 2525 mL / NET: -2045 mL    09-15-18 @ 07:01  -  09-15-18 @ 14:00  --------------------------------------------------------  IN: 120 mL / OUT: 0 mL / NET: 120 mL      Physical Exam:  	Gen: icteric, somnolent but arousable  	Pulm: CTA B/L  	CV: irregular, S1S2  	Abd: +BS, distended  	: No suprapubic tenderness.  + knapp          Extremity: B/L LE pedal edema  	Neuro: A&O x 3      LABS/STUDIES  --------------------------------------------------------------------------------              10.7   14.20 >-----------<  111      [09-15-18 @ 07:03]              31.0     127  |  89  |  12  ----------------------------<  111      [09-15-18 @ 06:15]  3.5   |  29  |  1.01        Ca     8.4     [09-15-18 @ 06:15]      Mg     2.0     [09-14-18 @ 05:58]            eGFR if Non African American: 85 mL/min/1.73M2 (09-15 @ 06:15)  eGFR if African American: 99 mL/min/1.73M2 (09-15 @ 06:15)    Creatinine Trend:  SCr 1.01 [09-15 @ 06:15]  SCr 0.97 [09-14 @ 05:58]  SCr 0.89 [09-13 @ 19:28]  SCr 0.82 [09-13 @ 06:20]  SCr 0.86 [09-12 @ 06:05]              Urinalysis - [09-06-18 @ 01:50]      Color Brown / Appearance SL Turbid / SG 1.018 / pH 6.0      Gluc Negative / Ketone Negative  / Bili Moderate / Urobili 8       Blood Negative / Protein Trace / Leuk Est Negative / Nitrite Negative      RBC 0-2 / WBC 3-5 / Hyaline 0-2 / Gran  / Sq Epi  / Non Sq Epi OCC / Bacteria Few      Iron 67, TIBC 94, %sat 71      [09-08-18 @ 10:01]  Ferritin 1886      [09-08-18 @ 09:14]    LEANA: titer 1:160, pattern [id number]      [09-08-18 @ 09:14]

## 2018-09-15 NOTE — PROVIDER CONTACT NOTE (OTHER) - ACTION/TREATMENT ORDERED:
Yissel Earl NP aware and acknowledged, will order for cpap machine at night. Will continue to monitor and maintain safety.

## 2018-09-15 NOTE — PROGRESS NOTE ADULT - SUBJECTIVE AND OBJECTIVE BOX
Patient is a 52y old  Male who presents with a chief complaint of Abdominal swelling (15 Sep 2018 14:00)      SUBJECTIVE / OVERNIGHT EVENTS:  feels the same.  urinary retention with 600 cc post void residual.  s/p knapp placed last night.  the gf at bedside.  no cp, no sob, no n/v/d. no abdominal pain.  no headache, no dizziness.   he wants lotion for dry skin of legs.       Vital Signs Last 24 Hrs  T(C): 37.1 (15 Sep 2018 12:10), Max: 37.7 (14 Sep 2018 18:32)  T(F): 98.8 (15 Sep 2018 12:10), Max: 99.9 (14 Sep 2018 18:32)  HR: 89 (15 Sep 2018 17:03) (89 - 118)  BP: 100/62 (15 Sep 2018 17:03) (95/51 - 118/69)  BP(mean): --  RR: 17 (15 Sep 2018 12:10) (16 - 18)  SpO2: 91% (15 Sep 2018 12:10) (90% - 93%)  I&O's Summary    14 Sep 2018 07:01  -  15 Sep 2018 07:00  --------------------------------------------------------  IN: 480 mL / OUT: 2525 mL / NET: -2045 mL    15 Sep 2018 07:01  -  15 Sep 2018 17:30  --------------------------------------------------------  IN: 120 mL / OUT: 790 mL / NET: -670 mL        PHYSICAL EXAM:  GENERAL: NAD  HEAD:  Atraumatic, Normocephalic  EYES: EOMI  NECK: Supple, No JVD, No LAD  CHEST/LUNG: mild dec breath sounds bilaterally; No wheeze  HEART: Regular rate and rhythm; No murmurs, rubs, or gallops  ABDOMEN: Markedly distended without focal tenderness  EXTREMITIES:  2+ Peripheral Pulses, mild tremors in hands when elbow extended.   SKIN: Anasarca with beefy red skin rash that is previously warm to touch, but now dissipating. almost normal skin color now, except mild erythema. resolved edema.   NEURO:  AAOx3, sleepy, but awake, nonfocal CN/motor/sensory/reflexes, minimal tremors of upper extremities.       LABS:                        10.7   14.20 )-----------( 111      ( 15 Sep 2018 07:03 )             31.0     09-15    127<L>  |  89<L>  |  12  ----------------------------<  111<H>  3.5   |  29  |  1.01    Ca    8.4      15 Sep 2018 06:15  Mg     2.0     09-14        CAPILLARY BLOOD GLUCOSE                RADIOLOGY & ADDITIONAL TESTS:    Imaging Personally Reviewed:  [x] YES  [ ] NO    Consultant(s) Notes Reviewed:  [x] YES  [ ] NO      MEDICATIONS  (STANDING):  chlordiazePOXIDE 25 milliGRAM(s) Oral at bedtime  cyanocobalamin 500 MICROGram(s) Oral daily  folic acid 1 milliGRAM(s) Oral daily  furosemide   Injectable 40 milliGRAM(s) IV Push two times a day  influenza   Vaccine 0.5 milliLiter(s) IntraMuscular once  metoprolol tartrate 25 milliGRAM(s) Oral two times a day  rifaximin 550 milliGRAM(s) Oral two times a day  spironolactone 100 milliGRAM(s) Oral daily  thiamine 100 milliGRAM(s) Oral daily    MEDICATIONS  (PRN):      Care Discussed with Consultants/Other Providers [x] YES  [ ] NO    HEALTH ISSUES - PROBLEM Dx:  Erythema of lower extremity: Erythema of lower extremity  SBP (spontaneous bacterial peritonitis): SBP (spontaneous bacterial peritonitis)  Swelling of lower limb: Swelling of lower limb  Suspected deep vein thrombosis  Alcohol abuse: Alcohol abuse  Afib: Afib  Macrocytic anemia: Macrocytic anemia  Alcoholic hepatitis: Alcoholic hepatitis  Ascites: Ascites  Hyponatremia: Hyponatremia  Severe sepsis: Severe sepsis

## 2018-09-15 NOTE — PROGRESS NOTE ADULT - ASSESSMENT
52M pmhx of Afib on xarelto, alcoholic hepatitis with cirrhosis and ascites with hyponatremia and volume overload

## 2018-09-15 NOTE — PROGRESS NOTE ADULT - PROBLEM SELECTOR PLAN 4
Hypervolemic hyponatremia likely 2/2 ascites  Appreciate renal  Fluid restriction 1L  Strict I/Os  Daily weights  removed sodium restriction from diet.

## 2018-09-16 DIAGNOSIS — E87.6 HYPOKALEMIA: ICD-10-CM

## 2018-09-16 LAB
ANION GAP SERPL CALC-SCNC: 10 MMOL/L — SIGNIFICANT CHANGE UP (ref 5–17)
BUN SERPL-MCNC: 13 MG/DL — SIGNIFICANT CHANGE UP (ref 7–23)
CALCIUM SERPL-MCNC: 7.9 MG/DL — LOW (ref 8.4–10.5)
CHLORIDE SERPL-SCNC: 89 MMOL/L — LOW (ref 96–108)
CO2 SERPL-SCNC: 28 MMOL/L — SIGNIFICANT CHANGE UP (ref 22–31)
CREAT SERPL-MCNC: 0.91 MG/DL — SIGNIFICANT CHANGE UP (ref 0.5–1.3)
GLUCOSE SERPL-MCNC: 86 MG/DL — SIGNIFICANT CHANGE UP (ref 70–99)
HCT VFR BLD CALC: 29.4 % — LOW (ref 39–50)
HGB BLD-MCNC: 10 G/DL — LOW (ref 13–17)
MCHC RBC-ENTMCNC: 34 GM/DL — SIGNIFICANT CHANGE UP (ref 32–36)
MCHC RBC-ENTMCNC: 36.5 PG — HIGH (ref 27–34)
MCV RBC AUTO: 107.3 FL — HIGH (ref 80–100)
PLATELET # BLD AUTO: 113 K/UL — LOW (ref 150–400)
POTASSIUM SERPL-MCNC: 3 MMOL/L — LOW (ref 3.5–5.3)
POTASSIUM SERPL-SCNC: 3 MMOL/L — LOW (ref 3.5–5.3)
RBC # BLD: 2.74 M/UL — LOW (ref 4.2–5.8)
RBC # FLD: 14.9 % — HIGH (ref 10.3–14.5)
SODIUM SERPL-SCNC: 127 MMOL/L — LOW (ref 135–145)
WBC # BLD: 12.97 K/UL — HIGH (ref 3.8–10.5)
WBC # FLD AUTO: 12.97 K/UL — HIGH (ref 3.8–10.5)

## 2018-09-16 RX ORDER — SIMETHICONE 80 MG/1
80 TABLET, CHEWABLE ORAL ONCE
Qty: 0 | Refills: 0 | Status: COMPLETED | OUTPATIENT
Start: 2018-09-16 | End: 2018-09-17

## 2018-09-16 RX ORDER — POTASSIUM CHLORIDE 20 MEQ
40 PACKET (EA) ORAL EVERY 4 HOURS
Qty: 0 | Refills: 0 | Status: COMPLETED | OUTPATIENT
Start: 2018-09-16 | End: 2018-09-16

## 2018-09-16 RX ADMIN — Medication 25 MILLIGRAM(S): at 05:33

## 2018-09-16 RX ADMIN — Medication 40 MILLIGRAM(S): at 05:13

## 2018-09-16 RX ADMIN — Medication 100 MILLIGRAM(S): at 08:22

## 2018-09-16 RX ADMIN — Medication 40 MILLIEQUIVALENT(S): at 09:15

## 2018-09-16 RX ADMIN — Medication 40 MILLIEQUIVALENT(S): at 13:01

## 2018-09-16 RX ADMIN — Medication 40 MILLIGRAM(S): at 17:07

## 2018-09-16 RX ADMIN — Medication 1 MILLIGRAM(S): at 08:23

## 2018-09-16 RX ADMIN — SPIRONOLACTONE 100 MILLIGRAM(S): 25 TABLET, FILM COATED ORAL at 05:13

## 2018-09-16 RX ADMIN — PREGABALIN 500 MICROGRAM(S): 225 CAPSULE ORAL at 08:22

## 2018-09-16 NOTE — PROGRESS NOTE ADULT - SUBJECTIVE AND OBJECTIVE BOX
More awake, alert and communicative.    Has been eating better.          Review of Systems:    General:  No wt loss, fevers, chills, night sweats,fatigue,   CV:  No pain, palpitatioins, hypo/hypertension  Resp:  No dyspnea, cough, tachypnea, wheezing  GI:  No pain, No nausea, No vomiting, No diarrhea, No constipatiion, No weight loss, No fever, No pruritis, No rectal bleeding, No tarry stools, No dysphagia,  :  No pain, bleeding, incontinence, nocturia      Vital Signs Last 24 Hrs  T(C): 36.7 (16 Sep 2018 04:02), Max: 37.1 (15 Sep 2018 12:10)  T(F): 98 (16 Sep 2018 04:02), Max: 98.8 (15 Sep 2018 12:10)  HR: 83 (16 Sep 2018 08:51) (83 - 95)  BP: 107/69 (16 Sep 2018 05:31) (95/51 - 107/69)  BP(mean): --  RR: 18 (16 Sep 2018 05:31) (17 - 18)  SpO2: 92% (16 Sep 2018 08:51) (89% - 98%)    PHYSICAL EXAM:    Constitutional: NAD, well-developed  Neck: No LAD, supple  Respiratory: CTA and P  Cardiovascular: S1 and S2, RRR, no M  Gastrointestinal: BS+, soft, NT/ND, neg HSM,  Extremities: No peripheral edema, neg clubing, cyanosis  Vascular: 2+ peripheral pulses  Neurological: alert and oriented to name and place, no focal deficits  Psychiatric: Normal mood, normal affect  Skin: No rashes    MEDICATIONS  (STANDING):  chlordiazePOXIDE 25 milliGRAM(s) Oral at bedtime  cyanocobalamin 500 MICROGram(s) Oral daily  folic acid 1 milliGRAM(s) Oral daily  furosemide   Injectable 40 milliGRAM(s) IV Push two times a day  influenza   Vaccine 0.5 milliLiter(s) IntraMuscular once  metoprolol tartrate 25 milliGRAM(s) Oral two times a day  potassium chloride    Tablet ER 40 milliEquivalent(s) Oral every 4 hours  rifaximin 550 milliGRAM(s) Oral two times a day  spironolactone 100 milliGRAM(s) Oral daily  thiamine 100 milliGRAM(s) Oral daily    MEDICATIONS  (PRN):      Allergies    No Known Allergies    Intolerances          LABS:                        10.7   14.20 )-----------( 111      ( 15 Sep 2018 07:03 )             31.0                         10.3   16.68 )-----------( 113      ( 14 Sep 2018 09:41 )             31.4     09-16    127<L>  |  89<L>  |  13  ----------------------------<  86  3.0<L>   |  28  |  0.91    Ca    7.9<L>      16 Sep 2018 06:50                RADIOLOGY & ADDITIONAL TESTS:

## 2018-09-16 NOTE — PROGRESS NOTE ADULT - SUBJECTIVE AND OBJECTIVE BOX
Patient is a 52y old  Male who presents with a chief complaint of Abdominal swelling (16 Sep 2018 09:28)      SUBJECTIVE / OVERNIGHT EVENTS:  more awake, still slow speech.  eating lunch on his own.  the gf at bedside.  no agitations.  Librium d/c.  no cp, no sob, no n/v/d. no abdominal pain.  no headache, no dizziness.       Vital Signs Last 24 Hrs  T(C): 36.6 (16 Sep 2018 12:00), Max: 37 (15 Sep 2018 20:00)  T(F): 97.9 (16 Sep 2018 12:00), Max: 98.6 (15 Sep 2018 20:00)  HR: 88 (16 Sep 2018 12:00) (83 - 94)  BP: 98/64 (16 Sep 2018 12:00) (96/59 - 107/69)  BP(mean): --  RR: 19 (16 Sep 2018 12:00) (18 - 19)  SpO2: 93% (16 Sep 2018 12:00) (89% - 98%)  I&O's Summary    15 Sep 2018 07:01  -  16 Sep 2018 07:00  --------------------------------------------------------  IN: 240 mL / OUT: 2290 mL / NET: -2050 mL    16 Sep 2018 07:01  -  16 Sep 2018 14:25  --------------------------------------------------------  IN: 480 mL / OUT: 0 mL / NET: 480 mL        PHYSICAL EXAM:  GENERAL: NAD  HEAD:  Atraumatic, Normocephalic  EYES: EOMI  NECK: Supple, No JVD, No LAD  CHEST/LUNG: mild dec breath sounds bilaterally; No wheeze  HEART: Regular rate and rhythm; No murmurs, rubs, or gallops  ABDOMEN: Markedly distended without focal tenderness  EXTREMITIES:  2+ Peripheral Pulses, mild tremors in hands when elbow extended.   SKIN: Anasarca with beefy red skin rash that is previously warm to touch, but now dissipating. almost normal skin color now, except mild erythema. resolved edema.   NEURO:  AAOx3, sleepy, but awake, nonfocal CN/motor/sensory/reflexes, minimal tremors of upper extremities.       LABS:                        10.0   12.97 )-----------( 113      ( 16 Sep 2018 09:21 )             29.4     09-16    127<L>  |  89<L>  |  13  ----------------------------<  86  3.0<L>   |  28  |  0.91    Ca    7.9<L>      16 Sep 2018 06:50        CAPILLARY BLOOD GLUCOSE                RADIOLOGY & ADDITIONAL TESTS:    Imaging Personally Reviewed:  [x] YES  [ ] NO    Consultant(s) Notes Reviewed:  [x] YES  [ ] NO      MEDICATIONS  (STANDING):  cyanocobalamin 500 MICROGram(s) Oral daily  folic acid 1 milliGRAM(s) Oral daily  furosemide   Injectable 40 milliGRAM(s) IV Push two times a day  influenza   Vaccine 0.5 milliLiter(s) IntraMuscular once  metoprolol tartrate 25 milliGRAM(s) Oral two times a day  rifaximin 550 milliGRAM(s) Oral two times a day  spironolactone 100 milliGRAM(s) Oral daily  thiamine 100 milliGRAM(s) Oral daily    MEDICATIONS  (PRN):      Care Discussed with Consultants/Other Providers [x] YES  [ ] NO    HEALTH ISSUES - PROBLEM Dx:  Erythema of lower extremity: Erythema of lower extremity  SBP (spontaneous bacterial peritonitis): SBP (spontaneous bacterial peritonitis)  Swelling of lower limb: Swelling of lower limb  Suspected deep vein thrombosis  Alcohol abuse: Alcohol abuse  Afib: Afib  Macrocytic anemia: Macrocytic anemia  Alcoholic hepatitis: Alcoholic hepatitis  Ascites: Ascites  Hyponatremia: Hyponatremia  Severe sepsis: Severe sepsis

## 2018-09-16 NOTE — PROGRESS NOTE ADULT - ASSESSMENT
52M pmhx of Afib on xarelto, alcoholic hepatitis with cirrhosis and ascites with hyponatremia and volume overload; hypokalemia noted today

## 2018-09-16 NOTE — PROGRESS NOTE ADULT - SUBJECTIVE AND OBJECTIVE BOX
[facility]   [phone]  Dr. Mccrary (covering for Dr. Alejandra)  RENAL FOLLOW UP NOTE  --------------------------------------------------------------------------------  Patient seen and examined.  More alert today as per family and eating better.    PAST HISTORY  --------------------------------------------------------------------------------  No significant changes to PMH, PSH, FHx, SHx, unless otherwise noted    ALLERGIES & MEDICATIONS  --------------------------------------------------------------------------------  Allergies    No Known Allergies    Intolerances      Standing Inpatient Medications  cyanocobalamin 500 MICROGram(s) Oral daily  folic acid 1 milliGRAM(s) Oral daily  furosemide   Injectable 40 milliGRAM(s) IV Push two times a day  influenza   Vaccine 0.5 milliLiter(s) IntraMuscular once  metoprolol tartrate 25 milliGRAM(s) Oral two times a day  rifaximin 550 milliGRAM(s) Oral two times a day  spironolactone 100 milliGRAM(s) Oral daily  thiamine 100 milliGRAM(s) Oral daily    PRN Inpatient Medications      FOCUSED REVIEW OF SYSTEMS  --------------------------------------------------------------------------------  +fatigue  denies fevers  denies CP/palpitations  denies SOB  denies N/V/abd pain      VITALS/PHYSICAL EXAM  --------------------------------------------------------------------------------  T(C): 36.6 (09-16-18 @ 12:00), Max: 37 (09-15-18 @ 20:00)  HR: 88 (09-16-18 @ 12:00) (83 - 94)  BP: 98/64 (09-16-18 @ 12:00) (96/59 - 107/69)  RR: 19 (09-16-18 @ 12:00) (18 - 19)  SpO2: 93% (09-16-18 @ 12:00) (89% - 98%)  Wt(kg): --        09-15-18 @ 07:01  -  09-16-18 @ 07:00  --------------------------------------------------------  IN: 240 mL / OUT: 2290 mL / NET: -2050 mL    09-16-18 @ 07:01  -  09-16-18 @ 15:23  --------------------------------------------------------  IN: 480 mL / OUT: 300 mL / NET: 180 mL      Physical Exam:  	Gen: icteric, more alert than yesterday  	Pulm: CTA B/L  	CV: irregular, S1S2  	Abd: +BS, distended  	: No suprapubic tenderness.  + knapp          Extremity: B/L LE pedal edema  	Neuro: A&O x 3    LABS/STUDIES  --------------------------------------------------------------------------------              10.0   12.97 >-----------<  113      [09-16-18 @ 09:21]              29.4     127  |  89  |  13  ----------------------------<  86      [09-16-18 @ 06:50]  3.0   |  28  |  0.91        Ca     7.9     [09-16-18 @ 06:50]            eGFR if Non African American: 97 mL/min/1.73M2 (09-16 @ 06:50)  eGFR if : 112 mL/min/1.73M2 (09-16 @ 06:50)    Creatinine Trend:  SCr 0.91 [09-16 @ 06:50]  SCr 1.01 [09-15 @ 06:15]  SCr 0.97 [09-14 @ 05:58]  SCr 0.89 [09-13 @ 19:28]  SCr 0.82 [09-13 @ 06:20]              Urinalysis - [09-06-18 @ 01:50]      Color Brown / Appearance SL Turbid / SG 1.018 / pH 6.0      Gluc Negative / Ketone Negative  / Bili Moderate / Urobili 8       Blood Negative / Protein Trace / Leuk Est Negative / Nitrite Negative      RBC 0-2 / WBC 3-5 / Hyaline 0-2 / Gran  / Sq Epi  / Non Sq Epi OCC / Bacteria Few      Iron 67, TIBC 94, %sat 71      [09-08-18 @ 10:01]  Ferritin 1886      [09-08-18 @ 09:14]    LEANA: titer 1:160, pattern [id number]      [09-08-18 @ 09:14]

## 2018-09-17 LAB
ANION GAP SERPL CALC-SCNC: 11 MMOL/L — SIGNIFICANT CHANGE UP (ref 5–17)
BUN SERPL-MCNC: 15 MG/DL — SIGNIFICANT CHANGE UP (ref 7–23)
CALCIUM SERPL-MCNC: 8.1 MG/DL — LOW (ref 8.4–10.5)
CHLORIDE SERPL-SCNC: 89 MMOL/L — LOW (ref 96–108)
CO2 SERPL-SCNC: 28 MMOL/L — SIGNIFICANT CHANGE UP (ref 22–31)
CREAT SERPL-MCNC: 0.86 MG/DL — SIGNIFICANT CHANGE UP (ref 0.5–1.3)
GLUCOSE SERPL-MCNC: 85 MG/DL — SIGNIFICANT CHANGE UP (ref 70–99)
HCT VFR BLD CALC: 29.7 % — LOW (ref 39–50)
HGB BLD-MCNC: 9.9 G/DL — LOW (ref 13–17)
MAGNESIUM SERPL-MCNC: 2.2 MG/DL — SIGNIFICANT CHANGE UP (ref 1.6–2.6)
MCHC RBC-ENTMCNC: 33.3 GM/DL — SIGNIFICANT CHANGE UP (ref 32–36)
MCHC RBC-ENTMCNC: 35.2 PG — HIGH (ref 27–34)
MCV RBC AUTO: 105.7 FL — HIGH (ref 80–100)
PLATELET # BLD AUTO: 133 K/UL — LOW (ref 150–400)
POTASSIUM SERPL-MCNC: 3.9 MMOL/L — SIGNIFICANT CHANGE UP (ref 3.5–5.3)
POTASSIUM SERPL-SCNC: 3.9 MMOL/L — SIGNIFICANT CHANGE UP (ref 3.5–5.3)
RBC # BLD: 2.81 M/UL — LOW (ref 4.2–5.8)
RBC # FLD: 14.9 % — HIGH (ref 10.3–14.5)
SODIUM SERPL-SCNC: 128 MMOL/L — LOW (ref 135–145)
WBC # BLD: 13.7 K/UL — HIGH (ref 3.8–10.5)
WBC # FLD AUTO: 13.7 K/UL — HIGH (ref 3.8–10.5)

## 2018-09-17 RX ORDER — TAMSULOSIN HYDROCHLORIDE 0.4 MG/1
0.4 CAPSULE ORAL AT BEDTIME
Qty: 0 | Refills: 0 | Status: DISCONTINUED | OUTPATIENT
Start: 2018-09-17 | End: 2018-09-25

## 2018-09-17 RX ORDER — FUROSEMIDE 40 MG
40 TABLET ORAL
Qty: 0 | Refills: 0 | Status: DISCONTINUED | OUTPATIENT
Start: 2018-09-17 | End: 2018-09-20

## 2018-09-17 RX ADMIN — Medication 1 MILLIGRAM(S): at 11:07

## 2018-09-17 RX ADMIN — Medication 40 MILLIGRAM(S): at 05:10

## 2018-09-17 RX ADMIN — SPIRONOLACTONE 100 MILLIGRAM(S): 25 TABLET, FILM COATED ORAL at 05:11

## 2018-09-17 RX ADMIN — Medication 40 MILLIGRAM(S): at 18:04

## 2018-09-17 RX ADMIN — SIMETHICONE 80 MILLIGRAM(S): 80 TABLET, CHEWABLE ORAL at 05:11

## 2018-09-17 RX ADMIN — Medication 25 MILLIGRAM(S): at 18:04

## 2018-09-17 RX ADMIN — PREGABALIN 500 MICROGRAM(S): 225 CAPSULE ORAL at 11:07

## 2018-09-17 RX ADMIN — Medication 25 MILLIGRAM(S): at 05:11

## 2018-09-17 RX ADMIN — TAMSULOSIN HYDROCHLORIDE 0.4 MILLIGRAM(S): 0.4 CAPSULE ORAL at 21:20

## 2018-09-17 RX ADMIN — Medication 100 MILLIGRAM(S): at 11:08

## 2018-09-17 NOTE — CONSULT NOTE ADULT - ATTENDING COMMENTS
Pt seen and examined. Pt somnolent this am. SPoke with pts wife. Suspect etiology of retention is multifactorial. With initial retention of almost 1L, suspect overstretch and needs decompression to aid in potential for trial of void. Recommend cont knapp for 1 week. Begin flomax. repeat TOV next week. As possible, encourage ambulation and minimize narcotics/anticholinergics.
Patient seen and examined.  Agree with resident note as above.  Patient presents with increasing LE edema, admitted for possible sepsis.  HypoNa incidentally found and is likely multifactorial.  Was fluid restricted overnight while repeat labs and urine studies were pending.  This AM labs are returning and will defer to renal regarding further tx and diagnostics.  Na is stable and patient is awake and alert, pleasant.  Maintain on medical floor.

## 2018-09-17 NOTE — PROGRESS NOTE ADULT - SUBJECTIVE AND OBJECTIVE BOX
[facility], Division of Infectious Diseases  GHAZALA Oliver A. Lee  [phone]  Name: HANDY EARLY  Age: 52y  Gender: Male  MRN: [id number]    Interval History--  Notes reviewed  much more alert today, states legs are less swollen, but his right leg hurts the way he is laying on it.  has an eczematous rash on right thigh that his girlfriend noted.    Past Medical History--  Atrial fibrillation  Liver disease due to alcohol  Dupuytren contracture      For details regarding the patient's social history, family history, and other miscellaneous elements, please refer the initial infectious diseases consultation and/or the admitting history and physical examination for this admission.    Allergies    No Known Allergies    Intolerances        Medications--  Antibiotics:  rifaximin 550 milliGRAM(s) Oral two times a day    Immunologic:  influenza   Vaccine 0.5 milliLiter(s) IntraMuscular once    Other:  cyanocobalamin  folic acid  furosemide   Injectable  metoprolol tartrate  spironolactone  thiamine      Review of Systems--  A 10-point review of systems was obtained.     Pertinent positives and negatives--  Constitutional: No fevers. No Chills. No Rigors.   Cardiovascular: No chest pain. No palpitations.  Respiratory: No shortness of breath. No cough.  Gastrointestinal: No nausea or vomiting. No diarrhea or constipation.   Psychiatric: + depresision    Review of systems otherwise negative except as previously noted.    Physical Examination--  Vital Signs: T(F): 98.2 (09-17-18 @ 03:50), Max: 98.6 (09-17-18 @ 00:11)  HR: 80 (09-17-18 @ 05:56)  BP: 99/64 (09-17-18 @ 05:56)  RR: 19 (09-17-18 @ 05:56)  SpO2: 95% (09-17-18 @ 05:56)  Wt(kg): --  General: Nontoxic-appearing Male in no acute distress.  HEENT: AT/NC.  Anicteric. Dentition fair.  Neck: Not rigid. No sense of mass.  Nodes: None palpable.  Lungs: Clear bilaterally without rales, wheezing or rhonchi  Heart: Regular rate and rhythm. No Murmur.   Abdomen: Bowel sounds present and normoactive. Soft. Nondistended. Nontender.   Extremities: No cyanosis or clubbing. ++ decreased edema. les erythema  Skin: Warm. Dry. Good turgor. No rash. No vasculitic stigmata.  Psychiatric: flat affect          Laboratory Studies--  CBC                        9.9    13.70 )-----------( 133      ( 17 Sep 2018 07:54 )             29.7       Chemistries  09-17    128<L>  |  89<L>  |  15  ----------------------------<  85  3.9   |  28  |  0.86    Ca    8.1<L>      17 Sep 2018 06:25  Mg     2.2     09-17        Culture Data    Culture - Fungal, Body Fluid (collected 10 Sep 2018 22:26)  Source: .Body Fluid Peritoneal Fluid  Preliminary Report (11 Sep 2018 07:11):    Testing in progress    Culture - Body Fluid with Gram Stain (collected 10 Sep 2018 22:26)  Source: .Body Fluid Peritoneal Fluid  Gram Stain (10 Sep 2018 23:36):    polymorphonuclear leukocytes seen    No organisms seen by cytocentrifuge  Final Report (15 Sep 2018 17:44):    No growth at 5 days    Culture - Acid Fast - Body Fluid w/Smear (collected 10 Sep 2018 22:26)  Source: .Body Fluid Peritoneal Fluid

## 2018-09-17 NOTE — CONSULT NOTE ADULT - SUBJECTIVE AND OBJECTIVE BOX
[facility]  [phone]  NEPHROLOGY      INITIAL CONSULT NOTE  --------------------------------------------------------------------------------    HPI:  52M pmhx of Afib on xarelto, alcoholic hepatitis with cirrhosis and ascities who presents from outpt office for evalution of worsening LE edema.  He drinks 4-6 beers or hard liquor drinks per day every day for the last > 20 years.  Unsure if he has W/D becuase he has never been a day w/o drinking.  he recently had a sonogram which showed ascites.  His legs started swelling last week which was concerning to him so he went to his PCP . noticed with hyponatremia     PAST HISTORY  --------------------------------------------------------------------------------  PAST MEDICAL & SURGICAL HISTORY:  Atrial fibrillation  Liver disease due to alcohol  Dupuytren contracture    FAMILY HISTORY:  No pertinent family history in first degree relatives    PAST SOCIAL HISTORY: + ETOH     ALLERGIES & MEDICATIONS  --------------------------------------------------------------------------------  Allergies    No Known Allergies    Intolerances      Standing Inpatient Medications  cefTRIAXone   IVPB      cefTRIAXone   IVPB 1 Gram(s) IV Intermittent every 24 hours  chlordiazePOXIDE 25 milliGRAM(s) Oral every 8 hours  folic acid 1 milliGRAM(s) Oral daily  influenza   Vaccine 0.5 milliLiter(s) IntraMuscular once  thiamine 100 milliGRAM(s) Oral daily    PRN Inpatient Medications  LORazepam   Injectable 2 milliGRAM(s) IntraMuscular every 4 hours PRN      REVIEW OF SYSTEMS  --------------------------------------------------------------------------------  Gen: No  fevers/chills   Skin: No rashes  Head/Eyes/Ears/Mouth: No headache; Normal hearing;  No sinus pain/discomfort, sore throat  Respiratory: No dyspnea, cough, wheezing, hemoptysis  CV: No chest pain, orthopnea  GI: No abdominal pain, diarrhea, nausea, vomiting, melena, hematochezia  : No dysuria, decrease urination or hesitancy urinating  hematuria, nocturia  MSK: No joint pain/swelling; no back pain  Neuro: No dizziness/lightheadedness, weakness, seizures, numbness, tingling  Heme: No easy bruising or bleeding  Endo: No heat/cold intolerance  Psych: No significant nervousness, anxiety or depression  also with + edema     All other systems were reviewed and are negative, except as noted.    VITALS/PHYSICAL EXAM  --------------------------------------------------------------------------------  T(C): 37.1 (09-06-18 @ 20:10), Max: 37.2 (09-06-18 @ 08:24)  HR: 119 (09-06-18 @ 20:10) (87 - 119)  BP: 107/65 (09-06-18 @ 20:10) (102/67 - 119/71)  RR: 18 (09-06-18 @ 20:10) (18 - 18)  SpO2: 94% (09-06-18 @ 20:10) (92% - 96%)  Wt(kg): --  Height (cm): 172.72 (09-06-18 @ 10:37)  Weight (kg): 92.4 (09-06-18 @ 10:37)  BMI (kg/m2): 31 (09-06-18 @ 10:37)  BSA (m2): 2.06 (09-06-18 @ 10:37)      09-06-18 @ 07:01  -  09-06-18 @ 20:32  --------------------------------------------------------  IN: 640 mL / OUT: 400 mL / NET: 240 mL      Physical Exam:  	Gen:  comfortable appearing   	no jvd , supple neck,   	Pulm: decrease bs  no rales or ronchi or wheezing  	CV: RRR, S1S2; no rub  	Abd: +BS, soft, + ascites   	: No suprapubic tenderness  	UE: Warm, no cyanosis  no clubbing,  no edema; no asterixis  	LE: Warm, no cyanosis  no clubbing, 3+ edema  	Neuro: alert and oriented. speech coherent   	  LABS/STUDIES  --------------------------------------------------------------------------------              10.8   13.20 >-----------<  147      [09-06-18 @ 07:49]              30.1   na 114  120  |  82  |  8   ----------------------------<  121      [09-06-18 @ 18:32]  4.0   |  23  |  0.76        Ca     7.9     [09-06-18 @ 18:32]      Mg     1.8     [09-06-18 @ 06:15]      Phos  2.4     [09-05-18 @ 16:09]    TPro  6.4  /  Alb  2.2  /  TBili  9.0  /  DBili  x   /  AST  181  /  ALT  76  /  AlkPhos  140  [09-06-18 @ 06:15]    PT/INR: PT 60.0 , INR 5.14       [09-06-18 @ 07:49]  PTT: 42.2       [09-06-18 @ 07:49]    Serum Osmolality 247      [09-05-18 @ 23:58]    Creatinine Trend:  SCr 0.76 [09-06 @ 18:32]  SCr 0.58 [09-06 @ 13:01]  SCr 0.69 [09-06 @ 06:15]  SCr 0.68 [09-05 @ 23:58]  SCr 0.72 [09-05 @ 16:09]    Urinalysis - [09-06-18 @ 01:50]      Color Brown / Appearance SL Turbid / SG 1.018 / pH 6.0      Gluc Negative / Ketone Negative  / Bili Moderate / Urobili 8       Blood Negative / Protein Trace / Leuk Est Negative / Nitrite Negative      RBC 0-2 / WBC 3-5 / Hyaline 0-2 / Gran  / Sq Epi  / Non Sq Epi OCC / Bacteria Few    Urine Sodium <20      [09-06-18 @ 11:26]  Urine Osmolality 422      [09-06-18 @ 04:57]
CHIEF COMPLAINT: lower extremity swelling     HPI: 51 yo Male with pmhx of AF on xarelto, alcohol use c/b cirrhosis presents with worsening lower extremity edema. Patient reports he first started developing bilateral lower extremity swelling in . Swelling has worsened this week as redness has developed bilaterally. His primary care doctor started spironolactone and furosemide this week. As per patient he had a transthoracic echocardiogram completed last week which was normal. Denies fevers, chills, nausea, vomiting, chest pain, SOB, abdominal pain. No orthopnea. Patient drinks 4-6 beers a day every day for the past 10 years. Last drink was this morning- had one beer. Reports no hx of seizures, intubation, or hospitalization for withdrawal. Denies tremors, palpitations, headache. Patient was admitted to the medicine floors for sepsis concerning for SBP. Patient has been started on ceftriaxone with plans for diagnostic paracentesis. MICU team was consulted for worsening hyponatremia (117->112). Patient at this time denies any nausea, vomiting, pain or complaints.      PAST MEDICAL & SURGICAL HISTORY:  Atrial fibrillation  Liver disease due to alcohol  Dupuytren contracture      FAMILY HISTORY:  No pertinent family history in first degree relatives      SOCIAL HISTORY:  Smoking: prior hx of tobacco use   EtOH Use: daily   Marital Status: has a girlfriend     Allergies    No Known Allergies    Intolerances        HOME MEDICATIONS:    REVIEW OF SYSTEMS:  [X ] All other systems negative  [ ] Unable to assess ROS because ________    OBJECTIVE:  ICU Vital Signs Last 24 Hrs  T(C): 36.7 (06 Sep 2018 00:00), Max: 37.2 (05 Sep 2018 19:37)  T(F): 98 (06 Sep 2018 00:00), Max: 98.9 (05 Sep 2018 19:37)  HR: 90 (06 Sep 2018 00:00) (87 - 93)  BP: 102/67 (06 Sep 2018 00:00) (97/61 - 127/80)  BP(mean): --  ABP: --  ABP(mean): --  RR: 18 (06 Sep 2018 00:00) (18 - 23)  SpO2: 93% (06 Sep 2018 00:00) (93% - 96%)    CAPILLARY BLOOD GLUCOSE    PHYSICAL EXAM:  General: NAD  HEENT: scleral icterus   Respiratory: CTAB, no wheezes  Cardiovascular: RRR, no murmurs  Abdomen: distended, NTP  Extremities: 2+ pitting edema to knees   Skin: erythema on anterior shins bilaterally    Neurological: AAOx3, negative asterixis   Psychiatry: flat affect     LINES:     HOSPITAL MEDICATIONS:  MEDICATIONS  (STANDING):  cefTRIAXone   IVPB      cefTRIAXone   IVPB 1 Gram(s) IV Intermittent every 24 hours  chlordiazePOXIDE 25 milliGRAM(s) Oral every 8 hours  folic acid 1 milliGRAM(s) Oral daily  influenza   Vaccine 0.5 milliLiter(s) IntraMuscular once  thiamine 100 milliGRAM(s) Oral daily    MEDICATIONS  (PRN):  LORazepam   Injectable 2 milliGRAM(s) IntraMuscular every 4 hours PRN CIWA > 8      LABS:                        12.2   15.3  )-----------( 154      ( 05 Sep 2018 16:09 )             36.0         112<LL>  |  78<L>  |  6<L>  ----------------------------<  113<H>  3.2<L>   |  23  |  0.68    Ca    7.8<L>      05 Sep 2018 23:58  Phos  2.4       Mg     1.8         TPro  7.2  /  Alb  2.5<L>  /  TBili  9.2<H>  /  DBili  3.9<H>  /  AST  213<H>  /  ALT  87<H>  /  AlkPhos  153<H>      PT/INR - ( 05 Sep 2018 16:22 )   PT: 66.7 sec;   INR: 5.96 ratio         PTT - ( 05 Sep 2018 16:22 )  PTT:45.4 sec  Urinalysis Basic - ( 06 Sep 2018 01:50 )    Color: Brown / Appearance: SL Turbid / S.018 / pH: x  Gluc: x / Ketone: Negative  / Bili: Moderate / Urobili: 8 mg/dL   Blood: x / Protein: Trace / Nitrite: Negative   Leuk Esterase: Negative / RBC: x / WBC x   Sq Epi: x / Non Sq Epi: x / Bacteria: x        Venous Blood Gas:   @ 16:22  7.50/35/40/27/72  VBG Lactate: 3.3      RADIOLOGY:  [X ] Reviewed and interpreted by me- CXR
[facility], Division of Infectious Diseases  GHAZALA Oliver A. Lee  [phone]  HANDY EARLY  52y, Male  [id number]    HPI:  52M pmhx of Afib on xarelto, alcoholic hepatitis with cirrhosis and ascities who presents from outpt office for evalution of worsening LE edema.  Pt states legs started swelling in June 2018 with the left more swollen at first.  In the last week he states they started getting red also and he went to see his pcp who recommended evaluation in ED.  He denies fevers, chills.  Legs hurt when you push on them.  NO recent antibx and did not have recent trauma to legs      PMH/PSH--  Atrial fibrillation  Liver disease due to alcohol  Dupuytren contracture      Allergies--  NKDA    Medications--  Antibiotics: cefTRIAXone   IVPB      cefTRIAXone   IVPB 1 Gram(s) IV Intermittent every 24 hours  vancomycin  IVPB 1000 milliGRAM(s) IV Intermittent every 12 hours    Immunologic: influenza   Vaccine 0.5 milliLiter(s) IntraMuscular once    Other: chlordiazePOXIDE  folic acid  furosemide   Injectable  LORazepam   Injectable PRN  potassium chloride    Tablet ER  thiamine      Social History--  EtOH: +++*  Tobacco: denies ***  Drug Use: denies ***    Family/Marital History--  NC  Remainder not relevant to clinical concern.    Travel/Environmental/Occupational History:  retired     Review of Systems:  A >=10-point review of systems was obtained.     Pertinent positives and negatives--  Constitutional: No fevers. No Chills. No Rigors.   Eyes: no blurry vision  ENMT: no rhinorrhea, no sore throat  Cardiovascular: No chest pain. No palpitations.  Respiratory: No shortness of breath. No cough.  Gastrointestinal: No nausea or vomiting. No diarrhea or constipation.   Genitourinary: no dysuria  Musculoskeletal: + weakness  Skin: no rash  Neurologic: no headache  Psychiatric: no anxiety  Review of systems otherwise negative except as previously noted.    Physical Exam--  Vital Signs: T(F): 98.3 (09-10-18 @ 04:00), Max: 98.6 (09-10-18 @ 00:06)  HR: 109 (09-10-18 @ 04:00)  BP: 105/67 (09-10-18 @ 04:00)  RR: 18 (09-10-18 @ 04:00)  SpO2: 92% (09-10-18 @ 04:00)  Wt(kg): --  General: Nontoxic-appearing Male in no acute distress.  HEENT: AT/NC. Anicteric. Conjunctiva pink and moist. Oropharynx clear. Dentition fair.  Neck: Not rigid. No sense of mass.  Nodes: None palpable.  Lungs: Clear bilaterally without rales, wheezing or rhonchi  Heart: tachy s1s2  Abdomen: Bowel sounds present and normoactive. Soft. ++ distended  Extremities: No cyanosis or clubbing. +++ edema. ++ erythema, nonpurulent, tender + warm  Skin: Warm. Dry. Good turgor. No rash. No vasculitic stigmata.  Psychiatric: slow and lethargic        Laboratory & Imaging Data--  CBC                        10.8   14.62 )-----------( 135      ( 10 Sep 2018 09:29 )             32.1       Chemistries  09-10    127<L>  |  85<L>  |  14  ----------------------------<  108<H>  3.1<L>   |  26  |  0.83    Ca    8.5      10 Sep 2018 07:21    TPro  5.9<L>  /  Alb  2.2<L>  /  TBili  5.5<H>  /  DBili  x   /  AST  162<H>  /  ALT  66<H>  /  AlkPhos  128<H>  09-09      Culture Data    Culture - Urine (collected 06 Sep 2018 05:09)  Source: .Urine Clean Catch (Midstream)  Final Report (07 Sep 2018 05:25):    No growth    Culture - Blood (collected 06 Sep 2018 04:44)  Source: .Blood Blood-Peripheral  Preliminary Report (07 Sep 2018 05:01):    No growth to date.    Culture - Blood (collected 06 Sep 2018 04:44)  Source: .Blood Blood-Peripheral  Preliminary Report (07 Sep 2018 05:01):    No growth to date.    < from: [facility] Duplex Lower Ext Vein Scan, Bilat (09.08.18 @ 11:02) >    EXAM:  DUPLEX SCAN EXT VEINS LOWER BI                            PROCEDURE DATE:  09/08/2018            INTERPRETATION:  CLINICAL INFORMATION: Lower extremity edema.    COMPARISON: None available.    TECHNIQUE: Duplex sonography of the BILATERAL LOWER extremities with   color and spectral Doppler, with and without compression.      FINDINGS:    There is normal compressibility of the bilateral common femoral, femoral   and popliteal veins. No calf vein thrombosis is detected.    Doppler examination shows normal spontaneous and phasic flow.   Subcutaneous edema is noted.    IMPRESSION:     No evidence of bilateral lower extremity deep venous thrombosis.    < end of copied text >      < from: Xray Chest 1 View AP/PA (09.05.18 @ 18:48) >    EXAM:  XR CHEST AP OR PA 1V                            PROCEDURE DATE:  09/05/2018            INTERPRETATION:  CLINICAL INFORMATION: Abdominal bilateral leg swelling   since June.    EXAM: Frontal radiograph of the chest.    COMPARISON: Chest radiograph from 8/19/2017    FINDINGS:    Poor inspiratory result. Bibasilar atelectasis. Otherwise the lungs are   clear. There are no pleural effusions or pneumothorax.    The cardiomediastinal silhouette is within normal limits.    The visualized osseous structures are within normal limits.    IMPRESSION:     Bibasilar atelectasis. Otherwise the lungs are clear.       < end of copied text >
Urology PA Consult Note    HPI:  This is a 52y old Male with PMHx of ETOH use c/b alcoholic hepatitis, cirrhosis, ascites s/p paracentesis 9/10, afib on xarelto, admitted for LE edema and cellulitis, who was found to be in urinary retention.  He had a catheter placed 9/15 for 950cc, and had the catheter removed today.  He failed TOV for 300cc and had the catheter replaced.  Denies hematuria, nocturia, constipation, history of  problems.  States he has not had a bowel movement today.  Currently without acute complaints.    PAST MEDICAL & SURGICAL HISTORY:  Atrial fibrillation  Liver disease due to alcohol  Dupuytren contracture      FAMILY HISTORY:  No pertinent family history in first degree relatives      SOCIAL HISTORY:   ETOH use daily    cyanocobalamin 500 MICROGram(s) Oral daily  folic acid 1 milliGRAM(s) Oral daily  furosemide    Tablet 40 milliGRAM(s) Oral two times a day  influenza   Vaccine 0.5 milliLiter(s) IntraMuscular once  rifaximin 550 milliGRAM(s) Oral two times a day  spironolactone 100 milliGRAM(s) Oral daily  tamsulosin 0.4 milliGRAM(s) Oral at bedtime  thiamine 100 milliGRAM(s) Oral daily      Allergies    No Known Allergies      REVIEW OF SYSTEMS: Pertinent positives and negatives as stated in HPI, otherwise negative    Vital signs  T(C): 36.9 (09-17-18 @ 20:08), Max: 37 (09-17-18 @ 00:11)  HR: 83 (09-17-18 @ 22:11)  BP: 96/59 (09-17-18 @ 20:08)  SpO2: 95% (09-17-18 @ 22:11)  Wt(kg): --    I&O's Summary    16 Sep 2018 07:01  -  17 Sep 2018 07:00  --------------------------------------------------------  IN: 720 mL / OUT: 2100 mL / NET: -1380 mL    17 Sep 2018 07:01  -  17 Sep 2018 22:18  --------------------------------------------------------  IN: 830 mL / OUT: 650 mL / NET: 180 mL        Physical Exam  Gen: NAD, A+Ox3  Abd: Soft, NT  : prostate 20gms, nontender, smooth  Ext: +edema present b/l    LABS:                            9.9    13.70 )-----------( 133      ( 17 Sep 2018 07:54 )             29.7     09-17    128<L>  |  89<L>  |  15  ----------------------------<  85  3.9   |  28  |  0.86    Ca    8.1<L>      17 Sep 2018 06:25  Mg     2.2     09-17            Urine culture: no growth  Blood culture: no growth
Patient is a 52y old  Male who presents with a chief complaint of Abdominal swelling (06 Sep 2018 11:14)      HPI:  52M pmhx of Afib on xarelto, alcoholic hepatitis with cirrhosis and ascities who presents from outpt office for evalution of worsening LE edema.  He drinks 4-6 beers or hard liquor drinks per day every day for the last > 20 years.  Unsure if he has W/D becuase he has never been a day w/o drinking.  He denies any fevers or chills, no nausea, vomiting or diarrhea.  His abdomen has been swollen for a while and he recently had a sonogram which showed ascites.  His legs started swelling last week which was concerning to him so he went to his PCP today.  He denies any confusion or disorientation, no seizures or fatigue. Occasional black/dark stools last was this morning.  He says that he had a Cscope last year with polyps for which he was due for repeat. No history of EGD. Has admitted to some recent nosebleeds. Has chronic slight cough, not productive of sputum. (05 Sep 2018 18:40)      PAST MEDICAL & SURGICAL HISTORY:  Atrial fibrillation  Liver disease due to alcohol  Dupuytren contracture      MEDICATIONS  (STANDING):  cefTRIAXone   IVPB      cefTRIAXone   IVPB 1 Gram(s) IV Intermittent every 24 hours  chlordiazePOXIDE 25 milliGRAM(s) Oral every 8 hours  folic acid 1 milliGRAM(s) Oral daily  influenza   Vaccine 0.5 milliLiter(s) IntraMuscular once  thiamine 100 milliGRAM(s) Oral daily    MEDICATIONS  (PRN):  LORazepam   Injectable 2 milliGRAM(s) IntraMuscular every 4 hours PRN CIWA > 8      Allergies    No Known Allergies    Intolerances        Review of Systems:    General:  No wt loss, fevers, chills, night sweats,fatigue,   CV:  No pain, palpitations, hypo/hypertension  Resp:  No dyspnea, cough, tachypnea, wheezing  GI:  see HPI  :  No pain, bleeding, incontinence, nocturia  Muscle:  No pain, weakness  Neuro:  No weakness, tingling, memory problems  Psych:  No fatigue, insomnia, mood problems, depression  Endocrine:  No polyuria, polydypsia, cold/heat intolerance  Heme:  No petechiae, ecchymosis, easy bruisability  Skin:  No rash, tattoos, scars, edema      Vital Signs Last 24 Hrs  T(C): 36.9 (06 Sep 2018 12:11), Max: 37.2 (05 Sep 2018 19:37)  T(F): 98.5 (06 Sep 2018 12:11), Max: 99 (06 Sep 2018 08:24)  HR: 110 (06 Sep 2018 12:11) (87 - 110)  BP: 106/64 (06 Sep 2018 12:11) (102/67 - 127/80)  BP(mean): --  RR: 18 (06 Sep 2018 12:11) (18 - 22)  SpO2: 96% (06 Sep 2018 12:11) (92% - 96%)    PHYSICAL EXAM:    Constitutional: NAD, well-developed  Neck: No LAD, supple  Respiratory: CTA and P  Cardiovascular: S1 and S2, RRR, no M  Gastrointestinal: BS+, soft, NT. Distended., neg HSM,  Extremities: LE pitting edema with b/l erythema.  Vascular: 2+ peripheral pulses  Neurological: A/O x 3, no focal deficits  Psychiatric: Normal mood, normal affect  Skin: No rashes      LABS:                        10.8   13.20 )-----------( 147      ( 06 Sep 2018 07:49 )             30.1                         12.2   15.3  )-----------( 154      ( 05 Sep 2018 16:09 )             36.0     09-    114<LL>  |  78<L>  |  7   ----------------------------<  125<H>  3.9   |  23  |  0.58    Ca    8.3<L>      06 Sep 2018 13:01  Phos  2.4     -  Mg     1.8         TPro  6.4  /  Alb  2.2<L>  /  TBili  9.0<H>  /  DBili  x   /  AST  181<H>  /  ALT  76<H>  /  AlkPhos  140<H>  09-    PT/INR - ( 06 Sep 2018 07:49 )   PT: 60.0 sec;   INR: 5.14 ratio         PTT - ( 06 Sep 2018 07:49 )  PTT:42.2 sec  Urinalysis Basic - ( 06 Sep 2018 01:50 )    Color: Brown / Appearance: SL Turbid / S.018 / pH: x  Gluc: x / Ketone: Negative  / Bili: Moderate / Urobili: 8 mg/dL   Blood: x / Protein: Trace / Nitrite: Negative   Leuk Esterase: Negative / RBC: 0-2 /HPF / WBC 3-5 /HPF   Sq Epi: x / Non Sq Epi: OCC /HPF / Bacteria: Few /HPF      LIVER FUNCTIONS - ( 06 Sep 2018 06:15 )  Alb: 2.2 g/dL / Pro: 6.4 g/dL / ALK PHOS: 140 U/L / ALT: 76 U/L / AST: 181 U/L / GGT: x         LIVER FUNCTIONS - ( 05 Sep 2018 16:09 )  Alb: 2.5 g/dL / Pro: 7.2 g/dL / ALK PHOS: 153 U/L / ALT: 87 U/L / AST: 213 U/L / GGT: x           Lactate, Blood: 2.0 mmol/L (18 @ 23:58)        RADIOLOGY & ADDITIONAL TESTS:

## 2018-09-17 NOTE — PROGRESS NOTE ADULT - ASSESSMENT
52m with afib, + etoh and cirrhosis  here with edema and erythema-- r/o cellulitis  leukocytosis-- stable  rash potentially related to lymphangitis (though could be prostraumatic in setting opf thrombocytopenia)

## 2018-09-17 NOTE — CHART NOTE - NSCHARTNOTEFT_GEN_A_CORE
d/w GI Dr. Kidd.  BP borderline.  HR likely due to EtOH withdrawl symptoms earlier.   will hold metoprolol and monitor for now.   monitor BP and HR.     - Dr. WOODS Htet ([facility])  - [phone]

## 2018-09-17 NOTE — CONSULT NOTE ADULT - ASSESSMENT
52 year old male with urinary retention    -encourage physical therapy, ambulation  -stool softeners and bowel regimen  -flomax qd  -avoid narcotics, anticholinergics

## 2018-09-17 NOTE — PROGRESS NOTE ADULT - SUBJECTIVE AND OBJECTIVE BOX
Patient is a 52y old  Male who presents with a chief complaint of Abdominal swelling (17 Sep 2018 17:18)      SUBJECTIVE / OVERNIGHT EVENTS:  more awake.  failed TOV.  900cc residuals.  no cp, no sob, no n/v/d. no abdominal pain.  no headache, no dizziness.   BP labile, but HR better controlled.   tele with heart rates 80-90s      Vital Signs Last 24 Hrs  T(C): 36.9 (17 Sep 2018 18:01), Max: 37 (17 Sep 2018 00:11)  T(F): 98.4 (17 Sep 2018 18:01), Max: 98.6 (17 Sep 2018 00:11)  HR: 95 (17 Sep 2018 18:01) (53 - 95)  BP: 100/66 (17 Sep 2018 18:01) (95/59 - 103/63)  BP(mean): --  RR: 18 (17 Sep 2018 18:01) (18 - 19)  SpO2: 95% (17 Sep 2018 18:01) (92% - 98%)  I&O's Summary    16 Sep 2018 07:01  -  17 Sep 2018 07:00  --------------------------------------------------------  IN: 720 mL / OUT: 2100 mL / NET: -1380 mL    17 Sep 2018 07:01  -  17 Sep 2018 18:52  --------------------------------------------------------  IN: 830 mL / OUT: 650 mL / NET: 180 mL      PHYSICAL EXAM:  GENERAL: NAD  HEAD:  Atraumatic, Normocephalic  EYES: EOMI  NECK: Supple, No JVD, No LAD  CHEST/LUNG: mild dec breath sounds bilaterally; No wheeze  HEART: Regular rate and rhythm; No murmurs, rubs, or gallops  ABDOMEN: Markedly distended without focal tenderness  EXTREMITIES:  2+ Peripheral Pulses, mild tremors in hands when elbow extended.   SKIN: Anasarca with beefy red skin rash that is previously warm to touch, but now dissipating. almost normal skin color now, except mild erythema. resolved edema.   NEURO:  AAOx3, sleepy, but awake, nonfocal CN/motor/sensory/reflexes, minimal tremors of upper extremities.     LABS:                        9.9    13.70 )-----------( 133      ( 17 Sep 2018 07:54 )             29.7     09-17    128<L>  |  89<L>  |  15  ----------------------------<  85  3.9   |  28  |  0.86    Ca    8.1<L>      17 Sep 2018 06:25  Mg     2.2     09-17        CAPILLARY BLOOD GLUCOSE                RADIOLOGY & ADDITIONAL TESTS:    Imaging Personally Reviewed:  [x] YES  [ ] NO    Consultant(s) Notes Reviewed:  [x] YES  [ ] NO      MEDICATIONS  (STANDING):  cyanocobalamin 500 MICROGram(s) Oral daily  folic acid 1 milliGRAM(s) Oral daily  furosemide    Tablet 40 milliGRAM(s) Oral two times a day  influenza   Vaccine 0.5 milliLiter(s) IntraMuscular once  metoprolol tartrate 25 milliGRAM(s) Oral two times a day  rifaximin 550 milliGRAM(s) Oral two times a day  spironolactone 100 milliGRAM(s) Oral daily  tamsulosin 0.4 milliGRAM(s) Oral at bedtime  thiamine 100 milliGRAM(s) Oral daily    MEDICATIONS  (PRN):      Care Discussed with Consultants/Other Providers [x] YES  [ ] NO    HEALTH ISSUES - PROBLEM Dx:  Hypokalemia: Hypokalemia  Erythema of lower extremity: Erythema of lower extremity  SBP (spontaneous bacterial peritonitis): SBP (spontaneous bacterial peritonitis)  Swelling of lower limb: Swelling of lower limb  Suspected deep vein thrombosis  Alcohol abuse: Alcohol abuse  Afib: Afib  Macrocytic anemia: Macrocytic anemia  Alcoholic hepatitis: Alcoholic hepatitis  Ascites: Ascites  Hyponatremia: Hyponatremia  Severe sepsis: Severe sepsis

## 2018-09-17 NOTE — PROGRESS NOTE ADULT - SUBJECTIVE AND OBJECTIVE BOX
[facility]   [phone]  RENAL FOLLOW UP NOTE  --------------------------------------------------------------------------------  Chief Complaint:    24 hour events/subjective:    seen earlier. pt brother at bedside  also d/w GI team as well   still c/o fatigue and states right thigh pain     PAST HISTORY  --------------------------------------------------------------------------------  No significant changes to PMH, PSH, FHx, SHx, unless otherwise noted    ALLERGIES & MEDICATIONS  --------------------------------------------------------------------------------  Allergies    No Known Allergies    Intolerances      Standing Inpatient Medications  cyanocobalamin 500 MICROGram(s) Oral daily  folic acid 1 milliGRAM(s) Oral daily  furosemide    Tablet 40 milliGRAM(s) Oral two times a day  influenza   Vaccine 0.5 milliLiter(s) IntraMuscular once  rifaximin 550 milliGRAM(s) Oral two times a day  spironolactone 100 milliGRAM(s) Oral daily  tamsulosin 0.4 milliGRAM(s) Oral at bedtime  thiamine 100 milliGRAM(s) Oral daily    PRN Inpatient Medications      REVIEW OF SYSTEMS  --------------------------------------------------------------------------------    Gen: denies fatigue, fevers/chills,  CVS: denies chest pain/palpitations  Resp: denies SOB/Cough  GI: Denies N/V/Abd pain  : Denies dysuria    All other systems were reviewed and are negative, except as noted.    VITALS/PHYSICAL EXAM  --------------------------------------------------------------------------------  T(C): 36.9 (09-17-18 @ 20:08), Max: 37 (09-17-18 @ 00:11)  HR: 83 (09-17-18 @ 22:11) (53 - 95)  BP: 96/59 (09-17-18 @ 20:08) (95/59 - 103/63)  RR: 18 (09-17-18 @ 20:08) (18 - 19)  SpO2: 95% (09-17-18 @ 22:11) (92% - 98%)  Wt(kg): --        09-16-18 @ 07:01  -  09-17-18 @ 07:00  --------------------------------------------------------  IN: 720 mL / OUT: 2100 mL / NET: -1380 mL    09-17-18 @ 07:01  -  09-17-18 @ 23:02  --------------------------------------------------------  IN: 830 mL / OUT: 1150 mL / NET: -320 mL      Physical Exam:  	Gen:  comfortable appearing   	no jvd , supple neck,   	Pulm: decrease bs  no rales or ronchi or wheezing  	CV: RRR, S1S2; no rub  	Abd: +BS, soft, + ascites decreased   	: No suprapubic tenderness  	UE: Warm, no cyanosis  no clubbing,  no edema; no asterixis  	LE: Warm, no cyanosis  no clubbing,  significantly decreased 2- edema      LABS/STUDIES  --------------------------------------------------------------------------------              9.9    13.70 >-----------<  133      [09-17-18 @ 07:54]              29.7     128  |  89  |  15  ----------------------------<  85      [09-17-18 @ 06:25]  3.9   |  28  |  0.86        Ca     8.1     [09-17-18 @ 06:25]      Mg     2.2     [09-17-18 @ 06:25]            Creatinine Trend:  SCr 0.86 [09-17 @ 06:25]  SCr 0.91 [09-16 @ 06:50]  SCr 1.01 [09-15 @ 06:15]  SCr 0.97 [09-14 @ 05:58]  SCr 0.89 [09-13 @ 19:28]              Urinalysis - [09-06-18 @ 01:50]      Color Brown / Appearance SL Turbid / SG 1.018 / pH 6.0      Gluc Negative / Ketone Negative  / Bili Moderate / Urobili 8       Blood Negative / Protein Trace / Leuk Est Negative / Nitrite Negative      RBC 0-2 / WBC 3-5 / Hyaline 0-2 / Gran  / Sq Epi  / Non Sq Epi OCC / Bacteria Few      Iron 67, TIBC 94, %sat 71      [09-08-18 @ 10:01]  Ferritin 1886      [09-08-18 @ 09:14]    LEANA: titer 1:160, pattern DFS70      [09-08-18 @ 09:14]

## 2018-09-17 NOTE — CONSULT NOTE ADULT - REASON FOR ADMISSION
Abdominal swelling
Abdominal swelling and leg redness

## 2018-09-17 NOTE — PROGRESS NOTE ADULT - SUBJECTIVE AND OBJECTIVE BOX
Awake and alert but slowed speech  Legs much less swollen and erythematous    Vital Signs Last 24 Hrs  T(C): 36.8 (17 Sep 2018 12:07), Max: 37 (17 Sep 2018 00:11)  T(F): 98.3 (17 Sep 2018 12:07), Max: 98.6 (17 Sep 2018 00:11)  HR: 53 (17 Sep 2018 12:07) (53 - 94)  BP: 95/59 (17 Sep 2018 12:07) (95/59 - 103/63)  BP(mean): --  RR: 18 (17 Sep 2018 12:07) (18 - 19)  SpO2: 95% (17 Sep 2018 12:07) (92% - 98%)    PHYSICAL EXAM:    Constitutional: NAD, well-developed  Neck: No LAD, supple  Respiratory: CTA and P  Cardiovascular: S1 and S2, RRR, no M  Gastrointestinal: BS+, soft, + ascites  Extremities: 2+ peripheral edema, neg clubing, cyanosis  Neurological: A/O x 3, no focal deficits, no asterixis      MEDICATIONS  (STANDING):  cyanocobalamin 500 MICROGram(s) Oral daily  folic acid 1 milliGRAM(s) Oral daily  furosemide   Injectable 40 milliGRAM(s) IV Push two times a day  influenza   Vaccine 0.5 milliLiter(s) IntraMuscular once  metoprolol tartrate 25 milliGRAM(s) Oral two times a day  rifaximin 550 milliGRAM(s) Oral two times a day  spironolactone 100 milliGRAM(s) Oral daily  tamsulosin 0.4 milliGRAM(s) Oral at bedtime  thiamine 100 milliGRAM(s) Oral daily    MEDICATIONS  (PRN):      Allergies    No Known Allergies    Intolerances          LABS:                        9.9    13.70 )-----------( 133      ( 17 Sep 2018 07:54 )             29.7                         10.0   12.97 )-----------( 113      ( 16 Sep 2018 09:21 )             29.4                         10.7   14.20 )-----------( 111      ( 15 Sep 2018 07:03 )             31.0     09-17    128<L>  |  89<L>  |  15  ----------------------------<  85  3.9   |  28  |  0.86    Ca    8.1<L>      17 Sep 2018 06:25  Mg     2.2     09-17                RADIOLOGY & ADDITIONAL TESTS:

## 2018-09-17 NOTE — PROGRESS NOTE ADULT - ASSESSMENT
52M pmhx of Afib on xarelto, alcoholic hepatitis with cirrhosis and ascities with severe hyponatremia and fluid overloaded    1- hyponatremia  2- ascites  3- alcoholism   4- anemia   5- hypokalemia    na improving  fluid status is improving  change lasix to 40 mg po bid  cont with aldactone k is better.   trend hb  d/w family at bedside  d/w gi team

## 2018-09-17 NOTE — PROGRESS NOTE ADULT - ASSESSMENT
Alcoholic Hepatitis  Decompensated Cirrhosis, presumed alcoholic, complicated by ascites, r/o SBP  Hyponatremia    Rec:  Continue Lasix 40 IV BID  Continue aldactone to 100 qD  Continue xifaxan  PT/OOB Alcoholic Hepatitis  Decompensated Cirrhosis, presumed alcoholic, complicated by ascites, r/o SBP  Hyponatremia    Rec:  Will change Lasix 40 IV BID to PO BID  Continue aldactone to 100 qD  Continue xifaxan  PT/OOB  Consider decreasing metoprolol dose or discontinuing as patient's BP and HR do not seem to tolerate standing dose

## 2018-09-18 DIAGNOSIS — R33.9 RETENTION OF URINE, UNSPECIFIED: ICD-10-CM

## 2018-09-18 LAB
ALBUMIN SERPL ELPH-MCNC: 2.1 G/DL — LOW (ref 3.3–5)
ALP SERPL-CCNC: 122 U/L — HIGH (ref 40–120)
ALT FLD-CCNC: 60 U/L — HIGH (ref 10–45)
ANION GAP SERPL CALC-SCNC: 12 MMOL/L — SIGNIFICANT CHANGE UP (ref 5–17)
AST SERPL-CCNC: 117 U/L — HIGH (ref 10–40)
BILIRUB DIRECT SERPL-MCNC: 1.6 MG/DL — HIGH (ref 0–0.2)
BILIRUB INDIRECT FLD-MCNC: 1.6 MG/DL — HIGH (ref 0.2–1)
BILIRUB SERPL-MCNC: 3.2 MG/DL — HIGH (ref 0.2–1.2)
BUN SERPL-MCNC: 16 MG/DL — SIGNIFICANT CHANGE UP (ref 7–23)
CALCIUM SERPL-MCNC: 8 MG/DL — LOW (ref 8.4–10.5)
CHLORIDE SERPL-SCNC: 91 MMOL/L — LOW (ref 96–108)
CO2 SERPL-SCNC: 27 MMOL/L — SIGNIFICANT CHANGE UP (ref 22–31)
CREAT SERPL-MCNC: 0.86 MG/DL — SIGNIFICANT CHANGE UP (ref 0.5–1.3)
GLUCOSE SERPL-MCNC: 90 MG/DL — SIGNIFICANT CHANGE UP (ref 70–99)
HCT VFR BLD CALC: 29.6 % — LOW (ref 39–50)
HGB BLD-MCNC: 10 G/DL — LOW (ref 13–17)
MCHC RBC-ENTMCNC: 33.8 GM/DL — SIGNIFICANT CHANGE UP (ref 32–36)
MCHC RBC-ENTMCNC: 36.1 PG — HIGH (ref 27–34)
MCV RBC AUTO: 106.9 FL — HIGH (ref 80–100)
PLATELET # BLD AUTO: 120 K/UL — LOW (ref 150–400)
POTASSIUM SERPL-MCNC: 4 MMOL/L — SIGNIFICANT CHANGE UP (ref 3.5–5.3)
POTASSIUM SERPL-SCNC: 4 MMOL/L — SIGNIFICANT CHANGE UP (ref 3.5–5.3)
PROT SERPL-MCNC: 6.2 G/DL — SIGNIFICANT CHANGE UP (ref 6–8.3)
RBC # BLD: 2.77 M/UL — LOW (ref 4.2–5.8)
RBC # FLD: 14.6 % — HIGH (ref 10.3–14.5)
SODIUM SERPL-SCNC: 130 MMOL/L — LOW (ref 135–145)
WBC # BLD: 11.84 K/UL — HIGH (ref 3.8–10.5)
WBC # FLD AUTO: 11.84 K/UL — HIGH (ref 3.8–10.5)

## 2018-09-18 PROCEDURE — 99253 IP/OBS CNSLTJ NEW/EST LOW 45: CPT

## 2018-09-18 RX ORDER — SENNA PLUS 8.6 MG/1
2 TABLET ORAL AT BEDTIME
Qty: 0 | Refills: 0 | Status: DISCONTINUED | OUTPATIENT
Start: 2018-09-18 | End: 2018-09-25

## 2018-09-18 RX ORDER — DOCUSATE SODIUM 100 MG
100 CAPSULE ORAL THREE TIMES A DAY
Qty: 0 | Refills: 0 | Status: DISCONTINUED | OUTPATIENT
Start: 2018-09-18 | End: 2018-09-25

## 2018-09-18 RX ADMIN — Medication 40 MILLIGRAM(S): at 17:30

## 2018-09-18 RX ADMIN — PREGABALIN 500 MICROGRAM(S): 225 CAPSULE ORAL at 11:26

## 2018-09-18 RX ADMIN — SENNA PLUS 2 TABLET(S): 8.6 TABLET ORAL at 21:08

## 2018-09-18 RX ADMIN — Medication 100 MILLIGRAM(S): at 12:19

## 2018-09-18 RX ADMIN — TAMSULOSIN HYDROCHLORIDE 0.4 MILLIGRAM(S): 0.4 CAPSULE ORAL at 21:08

## 2018-09-18 RX ADMIN — Medication 40 MILLIGRAM(S): at 05:05

## 2018-09-18 RX ADMIN — SPIRONOLACTONE 100 MILLIGRAM(S): 25 TABLET, FILM COATED ORAL at 05:05

## 2018-09-18 RX ADMIN — Medication 1 MILLIGRAM(S): at 11:26

## 2018-09-18 RX ADMIN — Medication 100 MILLIGRAM(S): at 21:08

## 2018-09-18 RX ADMIN — Medication 100 MILLIGRAM(S): at 11:27

## 2018-09-18 NOTE — PROGRESS NOTE ADULT - NSHPATTENDINGPLANDISCUSS_GEN_ALL_CORE
pt and the HCP GF at bedside.
pt and NP Noelle
pt and the HCP GF at bedside.
pt and the HCP GF at bedside. d/w NP Noelle
pt and the gf at bedside.

## 2018-09-18 NOTE — PROGRESS NOTE ADULT - SUBJECTIVE AND OBJECTIVE BOX
Patient is a 52y old  Male who presents with a chief complaint of Abdominal swelling (18 Sep 2018 09:19)      SUBJECTIVE / OVERNIGHT EVENTS:  remained sleepy.  HR stable.  the gf at bedside.  states he ate well.  still have knapp.  no events.       Vital Signs Last 24 Hrs  T(C): 37.1 (18 Sep 2018 20:00), Max: 37.3 (18 Sep 2018 04:00)  T(F): 98.8 (18 Sep 2018 20:00), Max: 99.1 (18 Sep 2018 04:00)  HR: 106 (18 Sep 2018 20:00) (83 - 109)  BP: 110/56 (18 Sep 2018 20:00) (93/56 - 119/70)  BP(mean): --  RR: 17 (18 Sep 2018 20:00) (17 - 18)  SpO2: 91% (18 Sep 2018 20:00) (91% - 95%)  I&O's Summary    17 Sep 2018 07:01  -  18 Sep 2018 07:00  --------------------------------------------------------  IN: 830 mL / OUT: 1775 mL / NET: -945 mL    18 Sep 2018 07:01  -  18 Sep 2018 21:00  --------------------------------------------------------  IN: 830 mL / OUT: 750 mL / NET: 80 mL        PHYSICAL EXAM:  GENERAL: NAD  HEAD:  Atraumatic, Normocephalic  EYES: EOMI  NECK: Supple, No JVD, No LAD  CHEST/LUNG: mild dec breath sounds bilaterally; No wheeze  HEART: Regular rate and rhythm; No murmurs, rubs, or gallops  ABDOMEN: Markedly distended without focal tenderness  EXTREMITIES:  2+ Peripheral Pulses, mild tremors in hands when elbow extended.   SKIN: Anasarca with beefy red skin rash that is previously warm to touch, but now dissipating. almost normal skin color now, except mild erythema. resolved edema. + edema  NEURO:  AAOx3, sleepy, but awake, nonfocal CN/motor/sensory/reflexes, minimal tremors of upper extremities.       LABS:                        10.0   11.84 )-----------( 120      ( 18 Sep 2018 07:54 )             29.6     09-18    130<L>  |  91<L>  |  16  ----------------------------<  90  4.0   |  27  |  0.86    Ca    8.0<L>      18 Sep 2018 05:54  Mg     2.2     09-17    TPro  6.2  /  Alb  2.1<L>  /  TBili  3.2<H>  /  DBili  1.6<H>  /  AST  117<H>  /  ALT  60<H>  /  AlkPhos  122<H>  09-18      CAPILLARY BLOOD GLUCOSE                RADIOLOGY & ADDITIONAL TESTS:    Imaging Personally Reviewed:  [x] YES  [ ] NO    Consultant(s) Notes Reviewed:  [x] YES  [ ] NO      MEDICATIONS  (STANDING):  cyanocobalamin 500 MICROGram(s) Oral daily  docusate sodium 100 milliGRAM(s) Oral three times a day  folic acid 1 milliGRAM(s) Oral daily  furosemide    Tablet 40 milliGRAM(s) Oral two times a day  influenza   Vaccine 0.5 milliLiter(s) IntraMuscular once  rifaximin 550 milliGRAM(s) Oral two times a day  senna 2 Tablet(s) Oral at bedtime  spironolactone 100 milliGRAM(s) Oral daily  tamsulosin 0.4 milliGRAM(s) Oral at bedtime  thiamine 100 milliGRAM(s) Oral daily    MEDICATIONS  (PRN):      Care Discussed with Consultants/Other Providers [x] YES  [ ] NO    HEALTH ISSUES - PROBLEM Dx:  Hypokalemia: Hypokalemia  Erythema of lower extremity: Erythema of lower extremity  SBP (spontaneous bacterial peritonitis): SBP (spontaneous bacterial peritonitis)  Swelling of lower limb: Swelling of lower limb  Suspected deep vein thrombosis  Alcohol abuse: Alcohol abuse  Afib: Afib  Macrocytic anemia: Macrocytic anemia  Alcoholic hepatitis: Alcoholic hepatitis  Ascites: Ascites  Hyponatremia: Hyponatremia  Severe sepsis: Severe sepsis

## 2018-09-18 NOTE — PROGRESS NOTE ADULT - ASSESSMENT
Alcoholic Hepatitis  Decompensated Cirrhosis, presumed alcoholic, complicated by ascites, r/o SBP  Hyponatremia    Rec:  Lasix 40 PO BID,  aldactone to 100 qD  Continue xifaxan  Thiamine, b12, folate  PT/OOB

## 2018-09-18 NOTE — PROGRESS NOTE ADULT - SUBJECTIVE AND OBJECTIVE BOX
Awake and alert. Tolerating breakfast. Tremulous.  Issues with CPAP mask and knapp overnight.  Legs much less swollen and erythema resolved.  Bilirubin improved    Vital Signs Last 24 Hrs  T(C): 37.3 (18 Sep 2018 04:00), Max: 37.3 (18 Sep 2018 04:00)  T(F): 99.1 (18 Sep 2018 04:00), Max: 99.1 (18 Sep 2018 04:00)  HR: 85 (18 Sep 2018 05:49) (53 - 95)  BP: 94/54 (18 Sep 2018 05:04) (93/56 - 100/66)  BP(mean): --  RR: 18 (18 Sep 2018 04:00) (18 - 18)  SpO2: 92% (18 Sep 2018 05:49) (91% - 95%)    PHYSICAL EXAM:    Constitutional: NAD, well-developed  Neck: No LAD, supple  Respiratory: CTA and P  Cardiovascular: S1 and S2, RRR, no M  Gastrointestinal: BS+, soft, + ascites  Extremities: 2+ peripheral edema, neg clubing, cyanosis  Neurological: A/O x 3, no focal deficits, no asterixis      MEDICATIONS  (STANDING):  cyanocobalamin 500 MICROGram(s) Oral daily  folic acid 1 milliGRAM(s) Oral daily  furosemide   Injectable 40 milliGRAM(s) IV Push two times a day  influenza   Vaccine 0.5 milliLiter(s) IntraMuscular once  metoprolol tartrate 25 milliGRAM(s) Oral two times a day  rifaximin 550 milliGRAM(s) Oral two times a day  spironolactone 100 milliGRAM(s) Oral daily  tamsulosin 0.4 milliGRAM(s) Oral at bedtime  thiamine 100 milliGRAM(s) Oral daily    MEDICATIONS  (PRN):      Allergies    No Known Allergies    Intolerances    LABS:                        10.0   11.84 )-----------( 120      ( 18 Sep 2018 07:54 )             29.6     09-18    130<L>  |  91<L>  |  16  ----------------------------<  90  4.0   |  27  |  0.86    Ca    8.0<L>      18 Sep 2018 05:54  Mg     2.2     09-17    TPro  6.2  /  Alb  2.1<L>  /  TBili  3.2<H>  /  DBili  1.6<H>  /  AST  117<H>  /  ALT  60<H>  /  AlkPhos  122<H>  09-18    LIVER FUNCTIONS - ( 18 Sep 2018 05:54 )  Alb: 2.1 g/dL / Pro: 6.2 g/dL / ALK PHOS: 122 U/L / ALT: 60 U/L / AST: 117 U/L / GGT: x                 RADIOLOGY & ADDITIONAL TESTS:

## 2018-09-18 NOTE — PROGRESS NOTE ADULT - ATTENDING COMMENTS
I'm available for issues over the weekend, please call if ID input needed.    Ambrosio Galvan MD  [phone]
over weekend  call [phone] with questions
- Dr. CHUCK Pierce ([facility])  - [phone]
# uriniary retention - unclear etiology  s/p knapp for 600 cc PVR initially.  failed TOV 9/17. 900cc residuals.  started flomax.     - Dr. CHUCK Stocket ([facility])  - [phone]
# uriniary retention - unclear etiology  s/p knapp for 600 cc PVR.  close monitoring.   will try TOV once he is more awake.     - Dr. CHUCK Pierce ([facility])  - [phone]
# uriniary retention - unclear etiology  s/p knapp for 600 cc PVR.  close monitoring.   will try TOV once he is more awake. likely tomorrow.  librium is off.     - Dr. CHUCK Pierce ([facility])  - [phone]
# uriniary retention, unclear.   straight cath and knapp as needed.   abdominal distention with ascitic fluid may play a role vs. pt unable to keep up with diuresis.   close monitoring.     - Dr. CHUCK Pierce ([facility])  - [phone]
- Dr. CHUCK Pierce ([facility])  - [phone]
Milton Villegas will be covering for me starting 9/19/18. He can be reached at  if needed.     - Dr. CHUCK Pierce ([facility])  - [phone]

## 2018-09-19 LAB
ANION GAP SERPL CALC-SCNC: 11 MMOL/L — SIGNIFICANT CHANGE UP (ref 5–17)
BUN SERPL-MCNC: 16 MG/DL — SIGNIFICANT CHANGE UP (ref 7–23)
CALCIUM SERPL-MCNC: 8.3 MG/DL — LOW (ref 8.4–10.5)
CHLORIDE SERPL-SCNC: 90 MMOL/L — LOW (ref 96–108)
CO2 SERPL-SCNC: 28 MMOL/L — SIGNIFICANT CHANGE UP (ref 22–31)
CREAT SERPL-MCNC: 0.88 MG/DL — SIGNIFICANT CHANGE UP (ref 0.5–1.3)
GLUCOSE SERPL-MCNC: 107 MG/DL — HIGH (ref 70–99)
HCT VFR BLD CALC: 28 % — LOW (ref 39–50)
HGB BLD-MCNC: 9.7 G/DL — LOW (ref 13–17)
MCHC RBC-ENTMCNC: 34.6 GM/DL — SIGNIFICANT CHANGE UP (ref 32–36)
MCHC RBC-ENTMCNC: 36.6 PG — HIGH (ref 27–34)
MCV RBC AUTO: 105.7 FL — HIGH (ref 80–100)
PLATELET # BLD AUTO: 117 K/UL — LOW (ref 150–400)
POTASSIUM SERPL-MCNC: 3.8 MMOL/L — SIGNIFICANT CHANGE UP (ref 3.5–5.3)
POTASSIUM SERPL-SCNC: 3.8 MMOL/L — SIGNIFICANT CHANGE UP (ref 3.5–5.3)
RBC # BLD: 2.65 M/UL — LOW (ref 4.2–5.8)
RBC # FLD: 15 % — HIGH (ref 10.3–14.5)
SODIUM SERPL-SCNC: 129 MMOL/L — LOW (ref 135–145)
WBC # BLD: 11.7 K/UL — HIGH (ref 3.8–10.5)
WBC # FLD AUTO: 11.7 K/UL — HIGH (ref 3.8–10.5)

## 2018-09-19 PROCEDURE — 93970 EXTREMITY STUDY: CPT | Mod: 26

## 2018-09-19 RX ORDER — LACTULOSE 10 G/15ML
20 SOLUTION ORAL
Qty: 0 | Refills: 0 | Status: DISCONTINUED | OUTPATIENT
Start: 2018-09-19 | End: 2018-09-25

## 2018-09-19 RX ORDER — POLYETHYLENE GLYCOL 3350 17 G/17G
17 POWDER, FOR SOLUTION ORAL ONCE
Qty: 0 | Refills: 0 | Status: COMPLETED | OUTPATIENT
Start: 2018-09-19 | End: 2018-09-19

## 2018-09-19 RX ADMIN — POLYETHYLENE GLYCOL 3350 17 GRAM(S): 17 POWDER, FOR SOLUTION ORAL at 14:23

## 2018-09-19 RX ADMIN — TAMSULOSIN HYDROCHLORIDE 0.4 MILLIGRAM(S): 0.4 CAPSULE ORAL at 21:08

## 2018-09-19 RX ADMIN — Medication 100 MILLIGRAM(S): at 21:08

## 2018-09-19 RX ADMIN — SPIRONOLACTONE 100 MILLIGRAM(S): 25 TABLET, FILM COATED ORAL at 05:17

## 2018-09-19 RX ADMIN — Medication 100 MILLIGRAM(S): at 05:17

## 2018-09-19 RX ADMIN — Medication 40 MILLIGRAM(S): at 17:58

## 2018-09-19 RX ADMIN — Medication 100 MILLIGRAM(S): at 13:42

## 2018-09-19 RX ADMIN — LACTULOSE 20 GRAM(S): 10 SOLUTION ORAL at 13:45

## 2018-09-19 RX ADMIN — Medication 40 MILLIGRAM(S): at 05:17

## 2018-09-19 RX ADMIN — Medication 1 MILLIGRAM(S): at 13:41

## 2018-09-19 RX ADMIN — PREGABALIN 500 MICROGRAM(S): 225 CAPSULE ORAL at 13:41

## 2018-09-19 RX ADMIN — SENNA PLUS 2 TABLET(S): 8.6 TABLET ORAL at 21:08

## 2018-09-19 NOTE — PROGRESS NOTE ADULT - SUBJECTIVE AND OBJECTIVE BOX
Slowed affect and speech      Vital Signs Last 24 Hrs  T(C): 37 (19 Sep 2018 12:14), Max: 37.1 (18 Sep 2018 20:00)  T(F): 98.6 (19 Sep 2018 12:14), Max: 98.8 (18 Sep 2018 20:00)  HR: 109 (19 Sep 2018 12:14) (97 - 109)  BP: 117/75 (19 Sep 2018 12:14) (99/58 - 119/70)  BP(mean): --  RR: 18 (19 Sep 2018 12:14) (17 - 18)  SpO2: 92% (19 Sep 2018 12:14) (90% - 96%)    PHYSICAL EXAM:    Constitutional: NAD, well-developed  Neck: No LAD, supple  Respiratory: CTA and P  Cardiovascular: S1 and S2, RRR, no M  Gastrointestinal: BS+, soft, ++ ascites  Extremities: decreased edema. erythema resolved  Neuro: No asterixis, flattened affect    MEDICATIONS  (STANDING):  cyanocobalamin 500 MICROGram(s) Oral daily  docusate sodium 100 milliGRAM(s) Oral three times a day  folic acid 1 milliGRAM(s) Oral daily  furosemide    Tablet 40 milliGRAM(s) Oral two times a day  influenza   Vaccine 0.5 milliLiter(s) IntraMuscular once  rifaximin 550 milliGRAM(s) Oral two times a day  senna 2 Tablet(s) Oral at bedtime  spironolactone 100 milliGRAM(s) Oral daily  tamsulosin 0.4 milliGRAM(s) Oral at bedtime  thiamine 100 milliGRAM(s) Oral daily    MEDICATIONS  (PRN):      Allergies    No Known Allergies    Intolerances          LABS:                        9.7    11.70 )-----------( 117      ( 19 Sep 2018 07:59 )             28.0                         10.0   11.84 )-----------( 120      ( 18 Sep 2018 07:54 )             29.6                         9.9    13.70 )-----------( 133      ( 17 Sep 2018 07:54 )             29.7     09-19    129<L>  |  90<L>  |  16  ----------------------------<  107<H>  3.8   |  28  |  0.88    Ca    8.3<L>      19 Sep 2018 06:45    TPro  6.2  /  Alb  2.1<L>  /  TBili  3.2<H>  /  DBili  1.6<H>  /  AST  117<H>  /  ALT  60<H>  /  AlkPhos  122<H>  09-18        LIVER FUNCTIONS - ( 18 Sep 2018 05:54 )  Alb: 2.1 g/dL / Pro: 6.2 g/dL / ALK PHOS: 122 U/L / ALT: 60 U/L / AST: 117 U/L / GGT: x               RADIOLOGY & ADDITIONAL TESTS:

## 2018-09-19 NOTE — PROGRESS NOTE ADULT - ASSESSMENT
Alcoholic Hepatitis  Decompensated Cirrhosis, presumed alcoholic, complicated by ascites, r/o SBP  Hyponatremia    Rec:  Lasix 40 PO BID,  aldactone to 100 qD  Continue xifaxan  Add lactulose BID to improve mental status  Continues to have R leg pain. Recommend US to r/o DVT  Consider therapeutic paracenteiss  Thiamine, b12, folate  PT/OOB

## 2018-09-19 NOTE — PROGRESS NOTE ADULT - SUBJECTIVE AND OBJECTIVE BOX
sleeping but arousable  No acute SOB or abd pain    Vital Signs Last 24 Hrs  T(C): 36.7 (19 Sep 2018 04:12), Max: 37.1 (18 Sep 2018 20:00)  T(F): 98 (19 Sep 2018 04:12), Max: 98.8 (18 Sep 2018 20:00)  HR: 97 (19 Sep 2018 05:37) (97 - 109)  BP: 99/58 (19 Sep 2018 04:12) (96/59 - 119/70)  BP(mean): --  RR: 17 (19 Sep 2018 04:12) (17 - 18)  SpO2: 95% (19 Sep 2018 05:37) (90% - 96%)    GENERAL: NAD  HEAD:  Atraumatic, Normocephalic  EYES: EOMI  NECK: Supple, No JVD, No LAD  CHEST/LUNG: mild dec breath sounds bilaterally; No wheeze  HEART: Regular rate and rhythm; No murmurs, rubs, or gallops  ABDOMEN: (+)distension, no focal tenderness  EXTREMITIES:  2+ Peripheral Pulses, mild tremors in hands when elbow extended.   SKIN: Anasarca with beefy red skin rash that is previously warm to touch, but now dissipating. almost normal skin color now, except mild erythema. (+)b/l LE edema  NEURO:  AAOx3, sleepy, but readily arousable, nonfocal CN/motor/sensory/reflexes, minimal tremors of upper extremities.     LABS:                        9.7    11.70 )-----------( 117      ( 19 Sep 2018 07:59 )             28.0     09-19    129<L>  |  90<L>  |  16  ----------------------------<  107<H>  3.8   |  28  |  0.88    Ca    8.3<L>      19 Sep 2018 06:45    TPro  6.2  /  Alb  2.1<L>  /  TBili  3.2<H>  /  DBili  1.6<H>  /  AST  117<H>  /  ALT  60<H>  /  AlkPhos  122<H>  09-18      CAPILLARY BLOOD GLUCOSE

## 2018-09-19 NOTE — PROVIDER CONTACT NOTE (OTHER) - ASSESSMENT
pt Aox4. pt has knapp in place for retention. pt denies urge to urinate. overnight shift output from [id number] is 700ml.

## 2018-09-20 DIAGNOSIS — Z29.9 ENCOUNTER FOR PROPHYLACTIC MEASURES, UNSPECIFIED: ICD-10-CM

## 2018-09-20 LAB
ANION GAP SERPL CALC-SCNC: 10 MMOL/L — SIGNIFICANT CHANGE UP (ref 5–17)
BUN SERPL-MCNC: 16 MG/DL — SIGNIFICANT CHANGE UP (ref 7–23)
CALCIUM SERPL-MCNC: 8.5 MG/DL — SIGNIFICANT CHANGE UP (ref 8.4–10.5)
CHLORIDE SERPL-SCNC: 93 MMOL/L — LOW (ref 96–108)
CO2 SERPL-SCNC: 26 MMOL/L — SIGNIFICANT CHANGE UP (ref 22–31)
CREAT SERPL-MCNC: 0.89 MG/DL — SIGNIFICANT CHANGE UP (ref 0.5–1.3)
GLUCOSE SERPL-MCNC: 96 MG/DL — SIGNIFICANT CHANGE UP (ref 70–99)
HCT VFR BLD CALC: 27.8 % — LOW (ref 39–50)
HGB BLD-MCNC: 9.5 G/DL — LOW (ref 13–17)
MCHC RBC-ENTMCNC: 34.2 GM/DL — SIGNIFICANT CHANGE UP (ref 32–36)
MCHC RBC-ENTMCNC: 36.3 PG — HIGH (ref 27–34)
MCV RBC AUTO: 106.1 FL — HIGH (ref 80–100)
PLATELET # BLD AUTO: 124 K/UL — LOW (ref 150–400)
POTASSIUM SERPL-MCNC: 4 MMOL/L — SIGNIFICANT CHANGE UP (ref 3.5–5.3)
POTASSIUM SERPL-SCNC: 4 MMOL/L — SIGNIFICANT CHANGE UP (ref 3.5–5.3)
RBC # BLD: 2.62 M/UL — LOW (ref 4.2–5.8)
RBC # FLD: 14.9 % — HIGH (ref 10.3–14.5)
SODIUM SERPL-SCNC: 129 MMOL/L — LOW (ref 135–145)
WBC # BLD: 14.32 K/UL — HIGH (ref 3.8–10.5)
WBC # FLD AUTO: 14.32 K/UL — HIGH (ref 3.8–10.5)

## 2018-09-20 PROCEDURE — 76705 ECHO EXAM OF ABDOMEN: CPT | Mod: 26

## 2018-09-20 RX ORDER — ACETAMINOPHEN 500 MG
650 TABLET ORAL ONCE
Qty: 0 | Refills: 0 | Status: COMPLETED | OUTPATIENT
Start: 2018-09-20 | End: 2018-09-20

## 2018-09-20 RX ORDER — FUROSEMIDE 40 MG
80 TABLET ORAL
Qty: 0 | Refills: 0 | Status: DISCONTINUED | OUTPATIENT
Start: 2018-09-20 | End: 2018-09-24

## 2018-09-20 RX ORDER — HEPARIN SODIUM 5000 [USP'U]/ML
5000 INJECTION INTRAVENOUS; SUBCUTANEOUS EVERY 12 HOURS
Qty: 0 | Refills: 0 | Status: DISCONTINUED | OUTPATIENT
Start: 2018-09-20 | End: 2018-09-25

## 2018-09-20 RX ORDER — PHYTONADIONE (VIT K1) 5 MG
5 TABLET ORAL ONCE
Qty: 0 | Refills: 0 | Status: COMPLETED | OUTPATIENT
Start: 2018-09-20 | End: 2018-09-20

## 2018-09-20 RX ADMIN — HEPARIN SODIUM 5000 UNIT(S): 5000 INJECTION INTRAVENOUS; SUBCUTANEOUS at 17:41

## 2018-09-20 RX ADMIN — Medication 40 MILLIGRAM(S): at 05:19

## 2018-09-20 RX ADMIN — LACTULOSE 20 GRAM(S): 10 SOLUTION ORAL at 05:18

## 2018-09-20 RX ADMIN — Medication 5 MILLIGRAM(S): at 17:36

## 2018-09-20 RX ADMIN — Medication 1 MILLIGRAM(S): at 12:23

## 2018-09-20 RX ADMIN — TAMSULOSIN HYDROCHLORIDE 0.4 MILLIGRAM(S): 0.4 CAPSULE ORAL at 22:30

## 2018-09-20 RX ADMIN — Medication 650 MILLIGRAM(S): at 09:53

## 2018-09-20 RX ADMIN — SPIRONOLACTONE 100 MILLIGRAM(S): 25 TABLET, FILM COATED ORAL at 05:19

## 2018-09-20 RX ADMIN — Medication 100 MILLIGRAM(S): at 22:30

## 2018-09-20 RX ADMIN — Medication 80 MILLIGRAM(S): at 17:37

## 2018-09-20 RX ADMIN — Medication 650 MILLIGRAM(S): at 10:53

## 2018-09-20 RX ADMIN — PREGABALIN 500 MICROGRAM(S): 225 CAPSULE ORAL at 12:24

## 2018-09-20 RX ADMIN — Medication 100 MILLIGRAM(S): at 12:24

## 2018-09-20 RX ADMIN — Medication 100 MILLIGRAM(S): at 05:18

## 2018-09-20 RX ADMIN — SENNA PLUS 2 TABLET(S): 8.6 TABLET ORAL at 22:30

## 2018-09-20 RX ADMIN — Medication 100 MILLIGRAM(S): at 13:23

## 2018-09-20 RX ADMIN — LACTULOSE 20 GRAM(S): 10 SOLUTION ORAL at 17:36

## 2018-09-20 NOTE — PROGRESS NOTE ADULT - ASSESSMENT
52M pmhx of Afib on xarelto, alcoholic hepatitis with cirrhosis and ascities with severe hyponatremia and fluid overloaded    1- hyponatremia  2- ascites  3- alcoholism   4- anemia   5- hypokalemia    na still low   fluid status worsened again   change lasix to  80 mg po bid  cont with aldactone k is steady   trend hb  d/w family at bedside

## 2018-09-20 NOTE — PROGRESS NOTE ADULT - SUBJECTIVE AND OBJECTIVE BOX
[facility]   [phone]  RENAL FOLLOW UP NOTE  --------------------------------------------------------------------------------  Chief Complaint:    24 hour events/subjective:    c/o tremor   c/o edema     PAST HISTORY  --------------------------------------------------------------------------------  No significant changes to PMH, PSH, FHx, SHx, unless otherwise noted    ALLERGIES & MEDICATIONS  --------------------------------------------------------------------------------  Allergies    No Known Allergies    Intolerances      Standing Inpatient Medications  cyanocobalamin 500 MICROGram(s) Oral daily  docusate sodium 100 milliGRAM(s) Oral three times a day  folic acid 1 milliGRAM(s) Oral daily  furosemide    Tablet 80 milliGRAM(s) Oral two times a day  heparin  Injectable 5000 Unit(s) SubCutaneous every 12 hours  influenza   Vaccine 0.5 milliLiter(s) IntraMuscular once  lactulose Syrup 20 Gram(s) Oral two times a day  rifaximin 550 milliGRAM(s) Oral two times a day  senna 2 Tablet(s) Oral at bedtime  spironolactone 100 milliGRAM(s) Oral daily  tamsulosin 0.4 milliGRAM(s) Oral at bedtime  thiamine 100 milliGRAM(s) Oral daily    PRN Inpatient Medications      REVIEW OF SYSTEMS  --------------------------------------------------------------------------------    Gen: denies fevers/chills,  CVS: denies chest pain/palpitations  Resp: denies SOB/Cough  GI: Denies N/V/Abd pain  : Denies dysuria/oliguria/hematuria    All other systems were reviewed and are negative, except as noted.    VITALS/PHYSICAL EXAM  --------------------------------------------------------------------------------  T(C): 36.9 (09-20-18 @ 19:57), Max: 37.2 (09-19-18 @ 23:48)  HR: 108 (09-20-18 @ 19:57) (95 - 114)  BP: 102/66 (09-20-18 @ 19:57) (96/57 - 110/65)  RR: 18 (09-20-18 @ 19:57) (18 - 18)  SpO2: 92% (09-20-18 @ 19:57) (90% - 96%)  Wt(kg): --        09-19-18 @ 07:01  -  09-20-18 @ 07:00  --------------------------------------------------------  IN: 360 mL / OUT: 1600 mL / NET: -1240 mL    09-20-18 @ 07:01  -  09-20-18 @ 21:24  --------------------------------------------------------  IN: 480 mL / OUT: 500 mL / NET: -20 mL      Physical Exam:  	  Gen:  tremors   	no jvd , supple neck,   	Pulm: decrease bs  no rales or ronchi or wheezing  	CV: RRR, S1S2; no rub  	Abd: +BS, soft, + ascites  	: No suprapubic tenderness  	UE: Warm, no cyanosis  no clubbing,  no edema; no asterixis  	LE: Warm, no cyanosis  no clubbing,  significantly decreased 2- edema    LABS/STUDIES  --------------------------------------------------------------------------------              9.5    14.32 >-----------<  124      [09-20-18 @ 07:43]              27.8     129  |  93  |  16  ----------------------------<  96      [09-20-18 @ 05:38]  4.0   |  26  |  0.89        Ca     8.5     [09-20-18 @ 05:38]            Creatinine Trend:  SCr 0.89 [09-20 @ 05:38]  SCr 0.88 [09-19 @ 06:45]  SCr 0.86 [09-18 @ 05:54]  SCr 0.86 [09-17 @ 06:25]  SCr 0.91 [09-16 @ 06:50]              Urinalysis - [09-06-18 @ 01:50]      Color Brown / Appearance SL Turbid / SG 1.018 / pH 6.0      Gluc Negative / Ketone Negative  / Bili Moderate / Urobili 8       Blood Negative / Protein Trace / Leuk Est Negative / Nitrite Negative      RBC 0-2 / WBC 3-5 / Hyaline 0-2 / Gran  / Sq Epi  / Non Sq Epi OCC / Bacteria Few      Iron 67, TIBC 94, %sat 71      [09-08-18 @ 10:01]  Ferritin 1886      [09-08-18 @ 09:14]    LEANA: titer 1:160, pattern [id number]      [09-08-18 @ 09:14]

## 2018-09-20 NOTE — PROGRESS NOTE ADULT - ASSESSMENT
Alcoholic Hepatitis  Decompensated Cirrhosis, presumed alcoholic, complicated by ascites, r/o SBP  Hyponatremia    Rec:  Continue lasix/aldactone  Continue lactulose/xifaxan  Negative LE DVT study.  Repeat therapeutic paracentesis  Thiamine, b12, folate  PT/OOB

## 2018-09-20 NOTE — PROGRESS NOTE ADULT - SUBJECTIVE AND OBJECTIVE BOX
Rt leg discomfort persists  venous dopplers were (-) for DVT  getting on stretcher for abd U/S    Vital Signs Last 24 Hrs  T(C): 36.9 (20 Sep 2018 04:11), Max: 37.2 (19 Sep 2018 23:48)  T(F): 98.5 (20 Sep 2018 04:11), Max: 98.9 (19 Sep 2018 23:48)  HR: 112 (20 Sep 2018 04:11) (95 - 112)  BP: 100/50 (20 Sep 2018 05:20) (96/57 - 117/75)  BP(mean): --  RR: 18 (20 Sep 2018 04:11) (18 - 18)  SpO2: 90% (20 Sep 2018 04:11) (90% - 96%)    GENERAL: NAD  HEAD:  Atraumatic, Normocephalic  EYES: EOMI  NECK: Supple, No JVD, No LAD  CHEST/LUNG: mild dec breath sounds bilaterally; No wheeze  HEART: Regular rate and rhythm; No murmurs, rubs, or gallops  ABDOMEN: (+)distension, no focal tenderness  EXTREMITIES:  2+ Peripheral Pulses, mild tremors in hands when elbow extended.   SKIN: Anasarca with beefy red skin rash that is previously warm to touch, but now dissipating. almost normal skin color now, except mild erythema. (+)b/l LE edema  NEURO:  AAOx3, sleepy, but readily arousable, nonfocal CN/motor/sensory/reflexes, minimal tremors of upper extremities.     LABS:                        9.5    14.32 )-----------( 124      ( 20 Sep 2018 07:43 )             27.8     09-20    129<L>  |  93<L>  |  16  ----------------------------<  96  4.0   |  26  |  0.89    Ca    8.5      20 Sep 2018 05:38        CAPILLARY BLOOD GLUCOSE

## 2018-09-21 LAB
ANION GAP SERPL CALC-SCNC: 10 MMOL/L — SIGNIFICANT CHANGE UP (ref 5–17)
BUN SERPL-MCNC: 15 MG/DL — SIGNIFICANT CHANGE UP (ref 7–23)
CALCIUM SERPL-MCNC: 8.2 MG/DL — LOW (ref 8.4–10.5)
CHLORIDE SERPL-SCNC: 91 MMOL/L — LOW (ref 96–108)
CO2 SERPL-SCNC: 26 MMOL/L — SIGNIFICANT CHANGE UP (ref 22–31)
CREAT SERPL-MCNC: 0.86 MG/DL — SIGNIFICANT CHANGE UP (ref 0.5–1.3)
GLUCOSE SERPL-MCNC: 84 MG/DL — SIGNIFICANT CHANGE UP (ref 70–99)
HCT VFR BLD CALC: 28.2 % — LOW (ref 39–50)
HGB BLD-MCNC: 9.6 G/DL — LOW (ref 13–17)
INR BLD: 1.57 RATIO — HIGH (ref 0.88–1.16)
MCHC RBC-ENTMCNC: 34 GM/DL — SIGNIFICANT CHANGE UP (ref 32–36)
MCHC RBC-ENTMCNC: 36.2 PG — HIGH (ref 27–34)
MCV RBC AUTO: 106.4 FL — HIGH (ref 80–100)
PLATELET # BLD AUTO: 127 K/UL — LOW (ref 150–400)
POTASSIUM SERPL-MCNC: 3.5 MMOL/L — SIGNIFICANT CHANGE UP (ref 3.5–5.3)
POTASSIUM SERPL-SCNC: 3.5 MMOL/L — SIGNIFICANT CHANGE UP (ref 3.5–5.3)
PROTHROM AB SERPL-ACNC: 17.9 SEC — HIGH (ref 10–13.1)
RBC # BLD: 2.65 M/UL — LOW (ref 4.2–5.8)
RBC # FLD: 15 % — HIGH (ref 10.3–14.5)
SODIUM SERPL-SCNC: 127 MMOL/L — LOW (ref 135–145)
WBC # BLD: 11.78 K/UL — HIGH (ref 3.8–10.5)
WBC # FLD AUTO: 11.78 K/UL — HIGH (ref 3.8–10.5)

## 2018-09-21 PROCEDURE — 49083 ABD PARACENTESIS W/IMAGING: CPT

## 2018-09-21 RX ADMIN — TAMSULOSIN HYDROCHLORIDE 0.4 MILLIGRAM(S): 0.4 CAPSULE ORAL at 23:15

## 2018-09-21 RX ADMIN — LACTULOSE 20 GRAM(S): 10 SOLUTION ORAL at 05:41

## 2018-09-21 RX ADMIN — Medication 80 MILLIGRAM(S): at 05:41

## 2018-09-21 RX ADMIN — Medication 100 MILLIGRAM(S): at 23:15

## 2018-09-21 RX ADMIN — Medication 100 MILLIGRAM(S): at 12:15

## 2018-09-21 RX ADMIN — Medication 100 MILLIGRAM(S): at 05:41

## 2018-09-21 RX ADMIN — LACTULOSE 20 GRAM(S): 10 SOLUTION ORAL at 17:05

## 2018-09-21 RX ADMIN — Medication 80 MILLIGRAM(S): at 17:06

## 2018-09-21 RX ADMIN — HEPARIN SODIUM 5000 UNIT(S): 5000 INJECTION INTRAVENOUS; SUBCUTANEOUS at 17:05

## 2018-09-21 RX ADMIN — Medication 1 MILLIGRAM(S): at 12:14

## 2018-09-21 RX ADMIN — Medication 100 MILLIGRAM(S): at 12:14

## 2018-09-21 RX ADMIN — PREGABALIN 500 MICROGRAM(S): 225 CAPSULE ORAL at 12:15

## 2018-09-21 RX ADMIN — SPIRONOLACTONE 100 MILLIGRAM(S): 25 TABLET, FILM COATED ORAL at 05:41

## 2018-09-21 NOTE — PROGRESS NOTE ADULT - ASSESSMENT
52M pmhx of Afib on xarelto, alcoholic hepatitis with cirrhosis and ascities with severe hyponatremia and fluid overloaded    1- hyponatremia  2- ascites  3- alcoholism   4- hypokalemia    na still low    lasix to  80 mg po bid edema improving in am likely can change to 40 bid   cont with aldactone k is steady   trend hb

## 2018-09-21 NOTE — PROGRESS NOTE ADULT - SUBJECTIVE AND OBJECTIVE BOX
Interventional Radiology    52y Male with PMH as below with H/O alcoholism with liver cirrhosis and recurrent ascites.  Pt referred to IR for therapeutic paracentesis.      PAST MEDICAL & SURGICAL HISTORY:  Atrial fibrillation  Liver disease due to alcohol  Dupuytren contracture    Allergies  No Known Allergies    Labs:                        9.6    11.78 )-----------( 127      ( 21 Sep 2018 07:50 )             28.2     09-21    127<L>  |  91<L>  |  15  ----------------------------<  84  3.5   |  26  |  0.86    Ca    8.2<L>      21 Sep 2018 06:28      PT/INR - ( 21 Sep 2018 09:15 )   PT: 17.9 sec;   INR: 1.57 ratio    Activated Partial Thromboplastin Time in AM (09.13.18 @ 07:56)    Activated Partial Thromboplastin Time: 31.8    After risks, benefits, alternatives discussion            Consent: Interventional Radiology    52y Male with PMH as below with H/O alcoholism with liver cirrhosis and recurrent ascites.  Pt referred to IR for therapeutic paracentesis.      PAST MEDICAL & SURGICAL HISTORY:  Atrial fibrillation  Liver disease due to alcohol  Dupuytren contracture    Allergies  No Known Allergies    Labs:                        9.6    11.78 )-----------( 127      ( 21 Sep 2018 07:50 )             28.2     09-21    127<L>  |  91<L>  |  15  ----------------------------<  84  3.5   |  26  |  0.86    Ca    8.2<L>      21 Sep 2018 06:28      PT/INR - ( 21 Sep 2018 09:15 )   PT: 17.9 sec;   INR: 1.57 ratio    Activated Partial Thromboplastin Time in AM (09.13.18 @ 07:56)    Activated Partial Thromboplastin Time: 31.8    After risks, benefits, alternatives discussion pt verbalized understanding and signed informed consent.  Will plan for image-guided paracentesis.    PARUL Long  [facility] [id number]  Ext [phone]             Consent:

## 2018-09-21 NOTE — PROGRESS NOTE ADULT - SUBJECTIVE AND OBJECTIVE BOX
S/p recurrent 4 L paracentesis.  Persistent left leg pain. (US neg for DVT)  Otherwise no current complaints.    Vital Signs Last 24 Hrs  T(C): 37 (21 Sep 2018 15:50), Max: 37 (21 Sep 2018 15:50)  T(F): 98.6 (21 Sep 2018 15:50), Max: 98.6 (21 Sep 2018 15:50)  HR: 109 (21 Sep 2018 15:50) (92 - 114)  BP: 97/51 (21 Sep 2018 15:50) (97/51 - 109/70)  BP(mean): --  RR: 18 (21 Sep 2018 15:50) (18 - 18)  SpO2: 93% (21 Sep 2018 15:50) (90% - 95%)    PHYSICAL EXAM:    Constitutional: NAD, well-developed  Neck: No LAD, supple  Respiratory: CTA and P  Cardiovascular: S1 and S2, RRR, no M  Gastrointestinal: BS+, soft, ascites improved  Extremities: decreased edema. erythema resolved  Neuro: No asterixis, flattened affect    MEDICATIONS  (STANDING):  cyanocobalamin 500 MICROGram(s) Oral daily  docusate sodium 100 milliGRAM(s) Oral three times a day  folic acid 1 milliGRAM(s) Oral daily  furosemide    Tablet 40 milliGRAM(s) Oral two times a day  influenza   Vaccine 0.5 milliLiter(s) IntraMuscular once  rifaximin 550 milliGRAM(s) Oral two times a day  senna 2 Tablet(s) Oral at bedtime  spironolactone 100 milliGRAM(s) Oral daily  tamsulosin 0.4 milliGRAM(s) Oral at bedtime  thiamine 100 milliGRAM(s) Oral daily    MEDICATIONS  (PRN):      Allergies    No Known Allergies    Intolerances      LABS:                        9.6    11.78 )-----------( 127      ( 21 Sep 2018 07:50 )             28.2     09-21    127<L>  |  91<L>  |  15  ----------------------------<  84  3.5   |  26  |  0.86    Ca    8.2<L>      21 Sep 2018 06:28        PT/INR - ( 21 Sep 2018 09:15 )   PT: 17.9 sec;   INR: 1.57 ratio                                         RADIOLOGY & ADDITIONAL TESTS:

## 2018-09-21 NOTE — PROGRESS NOTE ADULT - SUBJECTIVE AND OBJECTIVE BOX
denies any new s ymptoms    Vital Signs Last 24 Hrs  T(C): 37 (21 Sep 2018 15:50), Max: 37 (21 Sep 2018 15:50)  T(F): 98.6 (21 Sep 2018 15:50), Max: 98.6 (21 Sep 2018 15:50)  HR: 109 (21 Sep 2018 15:50) (92 - 114)  BP: 97/51 (21 Sep 2018 15:50) (97/51 - 109/70)  BP(mean): --  RR: 18 (21 Sep 2018 15:50) (18 - 18)  SpO2: 93% (21 Sep 2018 15:50) (90% - 95%)    GENERAL: NAD  HEAD:  Atraumatic, Normocephalic  EYES: EOMI  NECK: Supple, No JVD, No LAD  CHEST/LUNG: mild dec breath sounds bilaterally; No wheeze  HEART: Regular rate and rhythm; No murmurs, rubs, or gallops  ABDOMEN: (+)distension, no focal tenderness  EXTREMITIES:  2+ Peripheral Pulses, mild tremors in hands when elbow extended.   SKIN: Anasarca with beefy red skin rash that is previously warm to touch, but now dissipating. almost normal skin color now, except mild erythema. (+)b/l LE edema  NEURO:  AAOx3, sleepy, but readily arousable, nonfocal CN/motor/sensory/reflexes, minimal tremors of upper extremities.     LABS:                        9.6    11.78 )-----------( 127      ( 21 Sep 2018 07:50 )             28.2     09-21    127<L>  |  91<L>  |  15  ----------------------------<  84  3.5   |  26  |  0.86    Ca    8.2<L>      21 Sep 2018 06:28      PT/INR - ( 21 Sep 2018 09:15 )   PT: 17.9 sec;   INR: 1.57 ratio           CAPILLARY BLOOD GLUCOSE

## 2018-09-21 NOTE — PROGRESS NOTE ADULT - SUBJECTIVE AND OBJECTIVE BOX
[facility]   [phone]  RENAL FOLLOW UP NOTE  --------------------------------------------------------------------------------  Chief Complaint:    24 hour events/subjective:    seen. states feels "ok"    PAST HISTORY  --------------------------------------------------------------------------------  No significant changes to PMH, PSH, FHx, SHx, unless otherwise noted    ALLERGIES & MEDICATIONS  --------------------------------------------------------------------------------  Allergies    No Known Allergies    Intolerances      Standing Inpatient Medications  cyanocobalamin 500 MICROGram(s) Oral daily  docusate sodium 100 milliGRAM(s) Oral three times a day  folic acid 1 milliGRAM(s) Oral daily  furosemide    Tablet 80 milliGRAM(s) Oral two times a day  heparin  Injectable 5000 Unit(s) SubCutaneous every 12 hours  influenza   Vaccine 0.5 milliLiter(s) IntraMuscular once  lactulose Syrup 20 Gram(s) Oral two times a day  rifaximin 550 milliGRAM(s) Oral two times a day  senna 2 Tablet(s) Oral at bedtime  spironolactone 100 milliGRAM(s) Oral daily  tamsulosin 0.4 milliGRAM(s) Oral at bedtime  thiamine 100 milliGRAM(s) Oral daily    PRN Inpatient Medications      REVIEW OF SYSTEMS  --------------------------------------------------------------------------------    Gen: denies , fevers/chills,  CVS: denies chest pain/palpitations  Resp: denies SOB/Cough  GI: Denies N/V/Abd pain  : Denies dysuria    All other systems were reviewed and are negative, except as noted.    VITALS/PHYSICAL EXAM  --------------------------------------------------------------------------------  T(C): 36.9 (09-21-18 @ 11:25), Max: 36.9 (09-20-18 @ 19:57)  HR: 95 (09-21-18 @ 11:31) (95 - 114)  BP: 101/53 (09-21-18 @ 11:25) (101/53 - 109/70)  RR: 18 (09-21-18 @ 11:25) (18 - 18)  SpO2: 94% (09-21-18 @ 11:31) (90% - 95%)  Wt(kg): --        09-20-18 @ 07:01  -  09-21-18 @ 07:00  --------------------------------------------------------  IN: 480 mL / OUT: 1700 mL / NET: -1220 mL    09-21-18 @ 07:01  -  09-21-18 @ 13:40  --------------------------------------------------------  IN: 480 mL / OUT: 900 mL / NET: -420 mL      Physical Exam:  	  Gen:  tremors   	no jvd , supple neck,   	Pulm: decrease bs  no rales or ronchi or wheezing  	CV: RRR, S1S2; no rub  	Abd: +BS, soft, + ascites  	: No suprapubic tenderness  	UE: Warm, no cyanosis  no clubbing,  no edema; no asterixis  	LE: Warm, no cyanosis  no clubbing,  significantly decreased 2- edema  LABS/STUDIES  --------------------------------------------------------------------------------              9.6    11.78 >-----------<  127      [09-21-18 @ 07:50]              28.2     127  |  91  |  15  ----------------------------<  84      [09-21-18 @ 06:28]  3.5   |  26  |  0.86        Ca     8.2     [09-21-18 @ 06:28]      PT/INR: PT 17.9 , INR 1.57       [09-21-18 @ 09:15]      Creatinine Trend:  SCr 0.86 [09-21 @ 06:28]  SCr 0.89 [09-20 @ 05:38]  SCr 0.88 [09-19 @ 06:45]  SCr 0.86 [09-18 @ 05:54]  SCr 0.86 [09-17 @ 06:25]        	      Urinalysis - [09-06-18 @ 01:50]      Color Brown / Appearance SL Turbid / SG 1.018 / pH 6.0      Gluc Negative / Ketone Negative  / Bili Moderate / Urobili 8       Blood Negative / Protein Trace / Leuk Est Negative / Nitrite Negative      RBC 0-2 / WBC 3-5 / Hyaline 0-2 / Gran  / Sq Epi  / Non Sq Epi OCC / Bacteria Few      Iron 67, TIBC 94, %sat 71      [09-08-18 @ 10:01]  Ferritin 1886      [09-08-18 @ 09:14]    LEANA: titer 1:160, pattern [id number]      [09-08-18 @ 09:14]

## 2018-09-21 NOTE — PROGRESS NOTE ADULT - ASSESSMENT
For sore throat  Rapid strep test today is negative.   Your throat culture is pending. [facility] will call you if positive results to start antibiotics at that time.  No phone call if strep culture is negative  Symptomatic treat with fluids, rest, salt water gargles, lozenges, and over the counter pain reliever, such as tylenol or ibuprofen as needed.   Please follow up with primary care provider if not improving, worsening or new symptoms     For ear and nasal congestion  Sudafed (decongestant) for congestion.  Apply warm compresses/packs for 15 minutes daily.  Steam treatments or humidifier.  Tylenol or ibuprofen as needed for pain or fever  Please follow up with primary care provider if not improving, worsening or new symptoms    Alcoholic Hepatitis  Decompensated Cirrhosis, presumed alcoholic, complicated by ascites, r/o SBP  Hyponatremia    Rec:  Continue lasix/aldactone  Continue lactulose/xifaxan  Negative LE DVT study.  Thiamine, b12, folate  PT/OOB

## 2018-09-21 NOTE — PROGRESS NOTE ADULT - SUBJECTIVE AND OBJECTIVE BOX
Interventional Radiology  Procedure Note    Procedure: Paracentesis    Pre- Procedure Diagnosis: ETOH cirrhosis    Post- Procedure Diagnosis: same    Indication: recurrent ascites    Physician: Dedrick  PA: Elke Calvillo PA-C, Ryan Kemp     Needle: 5Fr drainer     Ascites Drained: 4.7L    Color: clear yellow    Specimens: none    Albumin: 25% in 50cc x1    Estimated Blood Loss: minimal    Complications:    Preliminary Report:  Under sterile conditions, abdomen prepped and draped, lidocaine 2% local given, using ultrasound guidance to right lower quadrant a  5Fr drainage needle drained 4.7L of ascites. Dry sterile dressing applied to insertion site. No immediate complications. Patient tolerated procedure well, VSS. ( Official report to follow).

## 2018-09-22 LAB
ANION GAP SERPL CALC-SCNC: 12 MMOL/L — SIGNIFICANT CHANGE UP (ref 5–17)
BUN SERPL-MCNC: 12 MG/DL — SIGNIFICANT CHANGE UP (ref 7–23)
CALCIUM SERPL-MCNC: 8.2 MG/DL — LOW (ref 8.4–10.5)
CHLORIDE SERPL-SCNC: 91 MMOL/L — LOW (ref 96–108)
CO2 SERPL-SCNC: 25 MMOL/L — SIGNIFICANT CHANGE UP (ref 22–31)
CREAT SERPL-MCNC: 0.83 MG/DL — SIGNIFICANT CHANGE UP (ref 0.5–1.3)
GLUCOSE SERPL-MCNC: 92 MG/DL — SIGNIFICANT CHANGE UP (ref 70–99)
HCT VFR BLD CALC: 29.9 % — LOW (ref 39–50)
HGB BLD-MCNC: 10.3 G/DL — LOW (ref 13–17)
MCHC RBC-ENTMCNC: 34.4 GM/DL — SIGNIFICANT CHANGE UP (ref 32–36)
MCHC RBC-ENTMCNC: 36.3 PG — HIGH (ref 27–34)
MCV RBC AUTO: 105.3 FL — HIGH (ref 80–100)
PLATELET # BLD AUTO: 131 K/UL — LOW (ref 150–400)
POTASSIUM SERPL-MCNC: 3.5 MMOL/L — SIGNIFICANT CHANGE UP (ref 3.5–5.3)
POTASSIUM SERPL-SCNC: 3.5 MMOL/L — SIGNIFICANT CHANGE UP (ref 3.5–5.3)
RBC # BLD: 2.84 M/UL — LOW (ref 4.2–5.8)
RBC # FLD: 14.8 % — HIGH (ref 10.3–14.5)
SODIUM SERPL-SCNC: 128 MMOL/L — LOW (ref 135–145)
WBC # BLD: 11.79 K/UL — HIGH (ref 3.8–10.5)
WBC # FLD AUTO: 11.79 K/UL — HIGH (ref 3.8–10.5)

## 2018-09-22 RX ADMIN — PREGABALIN 500 MICROGRAM(S): 225 CAPSULE ORAL at 11:34

## 2018-09-22 RX ADMIN — SENNA PLUS 2 TABLET(S): 8.6 TABLET ORAL at 21:14

## 2018-09-22 RX ADMIN — Medication 100 MILLIGRAM(S): at 11:35

## 2018-09-22 RX ADMIN — Medication 100 MILLIGRAM(S): at 21:14

## 2018-09-22 RX ADMIN — SPIRONOLACTONE 100 MILLIGRAM(S): 25 TABLET, FILM COATED ORAL at 05:45

## 2018-09-22 RX ADMIN — Medication 100 MILLIGRAM(S): at 05:45

## 2018-09-22 RX ADMIN — HEPARIN SODIUM 5000 UNIT(S): 5000 INJECTION INTRAVENOUS; SUBCUTANEOUS at 17:19

## 2018-09-22 RX ADMIN — Medication 80 MILLIGRAM(S): at 05:45

## 2018-09-22 RX ADMIN — Medication 1 MILLIGRAM(S): at 11:34

## 2018-09-22 RX ADMIN — Medication 100 MILLIGRAM(S): at 11:34

## 2018-09-22 RX ADMIN — TAMSULOSIN HYDROCHLORIDE 0.4 MILLIGRAM(S): 0.4 CAPSULE ORAL at 21:14

## 2018-09-22 RX ADMIN — HEPARIN SODIUM 5000 UNIT(S): 5000 INJECTION INTRAVENOUS; SUBCUTANEOUS at 05:45

## 2018-09-22 RX ADMIN — LACTULOSE 20 GRAM(S): 10 SOLUTION ORAL at 17:19

## 2018-09-22 RX ADMIN — LACTULOSE 20 GRAM(S): 10 SOLUTION ORAL at 05:45

## 2018-09-22 RX ADMIN — Medication 80 MILLIGRAM(S): at 17:19

## 2018-09-22 NOTE — PROGRESS NOTE ADULT - ASSESSMENT
Alcoholic Hepatitis  Decompensated Cirrhosis, presumed alcoholic, complicated by ascites, possible SBP s/p short course of unasyn    Rec:  Continue lasix/aldactone.low salt diet  Continue lactulose/xifaxan for PSE  Negative LE DVT study.  Thiamine, b12, folate  PT/OOB  Repeat LFTS/coags in AM.  Xarelto on hold for now

## 2018-09-22 NOTE — PROGRESS NOTE ADULT - SUBJECTIVE AND OBJECTIVE BOX
INTERVAL HPI/OVERNIGHT EVENTS:  Reports generalized fatigue adn LE weakness; reports 1 BM daily; no abdominal pain; No N/V; No f/C    MEDICATIONS  (STANDING):  cyanocobalamin 500 MICROGram(s) Oral daily  docusate sodium 100 milliGRAM(s) Oral three times a day  folic acid 1 milliGRAM(s) Oral daily  furosemide    Tablet 80 milliGRAM(s) Oral two times a day  heparin  Injectable 5000 Unit(s) SubCutaneous every 12 hours  influenza   Vaccine 0.5 milliLiter(s) IntraMuscular once  lactulose Syrup 20 Gram(s) Oral two times a day  rifaximin 550 milliGRAM(s) Oral two times a day  senna 2 Tablet(s) Oral at bedtime  spironolactone 100 milliGRAM(s) Oral daily  tamsulosin 0.4 milliGRAM(s) Oral at bedtime  thiamine 100 milliGRAM(s) Oral daily    MEDICATIONS  (PRN):      Allergies    No Known Allergies      Vital Signs Last 24 Hrs  T(C): 36.9 (22 Sep 2018 04:43), Max: 37.2 (21 Sep 2018 20:30)  T(F): 98.4 (22 Sep 2018 04:43), Max: 98.9 (21 Sep 2018 20:30)  HR: 103 (22 Sep 2018 04:43) (92 - 115)  BP: 102/57 (22 Sep 2018 04:43) (93/57 - 107/58)  BP(mean): --  RR: 18 (22 Sep 2018 05:52) (18 - 19)  SpO2: 95% (22 Sep 2018 05:52) (90% - 95%)    PHYSICAL EXAM:      Constitutional: NAD, well-developed  HEENT: anicteric  Respiratory: CTA and P  Cardiovascular: S1 and S2, RRR, no M  Gastrointestinal: abdomen distended but soft; NTND  Extremities: + b/l edema  Neurological: A/O x 3, no focal deficits; no asterixis but tremulous  Psychiatric: blunted affect    LABS:                        10.3   11.79 )-----------( 131      ( 22 Sep 2018 06:54 )             29.9     Hemoglobin: 10.3 g/dL (09-22 @ 06:54)  Hemoglobin: 9.6 g/dL (09-21 @ 07:50)  Hemoglobin: 9.5 g/dL (09-20 @ 07:43)      09-22    128<L>  |  91<L>  |  12  ----------------------------<  92  3.5   |  25  |  0.83    Ca    8.2<L>      22 Sep 2018 05:35                    RADIOLOGY & ADDITIONAL TESTS:

## 2018-09-22 NOTE — PROGRESS NOTE ADULT - SUBJECTIVE AND OBJECTIVE BOX
He denies any new symptoms    Vital Signs Last 24 Hrs  T(C): 36.6 (22 Sep 2018 11:42), Max: 37.2 (21 Sep 2018 20:30)  T(F): 97.9 (22 Sep 2018 11:42), Max: 98.9 (21 Sep 2018 20:30)  HR: 92 (22 Sep 2018 12:25) (92 - 115)  BP: 102/56 (22 Sep 2018 11:42) (93/57 - 107/58)  BP(mean): --  RR: 18 (22 Sep 2018 11:42) (18 - 19)  SpO2: 98% (22 Sep 2018 12:25) (90% - 98%)    GENERAL: NAD  HEAD:  Atraumatic, Normocephalic  EYES: EOMI  NECK: Supple, No JVD, No LAD  CHEST/LUNG: mild dec breath sounds bilaterally; No wheeze  HEART: Regular rate and rhythm; No murmurs, rubs, or gallops  ABDOMEN: (+)distension, no focal tenderness  EXTREMITIES:  2+ Peripheral Pulses, mild tremors in hands when elbow extended.   SKIN: Anasarca with beefy red skin rash that is previously warm to touch, but now dissipating. almost normal skin color now, except mild erythema. (+)b/l LE edema  NEURO:  AAOx3, sleepy, but readily arousable, nonfocal CN/motor/sensory/reflexes, minimal tremors of upper extremities.     LABS:                        10.3   11.79 )-----------( 131      ( 22 Sep 2018 06:54 )             29.9     09-22    128<L>  |  91<L>  |  12  ----------------------------<  92  3.5   |  25  |  0.83    Ca    8.2<L>      22 Sep 2018 05:35      PT/INR - ( 21 Sep 2018 09:15 )   PT: 17.9 sec;   INR: 1.57 ratio           CAPILLARY BLOOD GLUCOSE

## 2018-09-23 LAB
ALBUMIN SERPL ELPH-MCNC: 2.6 G/DL — LOW (ref 3.3–5)
ALP SERPL-CCNC: 125 U/L — HIGH (ref 40–120)
ALT FLD-CCNC: 55 U/L — HIGH (ref 10–45)
ANION GAP SERPL CALC-SCNC: 12 MMOL/L — SIGNIFICANT CHANGE UP (ref 5–17)
APTT BLD: 33.9 SEC — SIGNIFICANT CHANGE UP (ref 27.5–37.4)
AST SERPL-CCNC: 94 U/L — HIGH (ref 10–40)
BILIRUB SERPL-MCNC: 3.9 MG/DL — HIGH (ref 0.2–1.2)
BUN SERPL-MCNC: 13 MG/DL — SIGNIFICANT CHANGE UP (ref 7–23)
CALCIUM SERPL-MCNC: 8.3 MG/DL — LOW (ref 8.4–10.5)
CHLORIDE SERPL-SCNC: 89 MMOL/L — LOW (ref 96–108)
CO2 SERPL-SCNC: 26 MMOL/L — SIGNIFICANT CHANGE UP (ref 22–31)
CREAT SERPL-MCNC: 0.82 MG/DL — SIGNIFICANT CHANGE UP (ref 0.5–1.3)
GLUCOSE SERPL-MCNC: 114 MG/DL — HIGH (ref 70–99)
HCT VFR BLD CALC: 31.1 % — LOW (ref 39–50)
HGB BLD-MCNC: 10.5 G/DL — LOW (ref 13–17)
INR BLD: 1.45 RATIO — HIGH (ref 0.88–1.16)
MCHC RBC-ENTMCNC: 33.8 GM/DL — SIGNIFICANT CHANGE UP (ref 32–36)
MCHC RBC-ENTMCNC: 35.5 PG — HIGH (ref 27–34)
MCV RBC AUTO: 105.1 FL — HIGH (ref 80–100)
PLATELET # BLD AUTO: 142 K/UL — LOW (ref 150–400)
POTASSIUM SERPL-MCNC: 2.9 MMOL/L — CRITICAL LOW (ref 3.5–5.3)
POTASSIUM SERPL-MCNC: 4.2 MMOL/L — SIGNIFICANT CHANGE UP (ref 3.5–5.3)
POTASSIUM SERPL-SCNC: 2.9 MMOL/L — CRITICAL LOW (ref 3.5–5.3)
POTASSIUM SERPL-SCNC: 4.2 MMOL/L — SIGNIFICANT CHANGE UP (ref 3.5–5.3)
PROT SERPL-MCNC: 7.2 G/DL — SIGNIFICANT CHANGE UP (ref 6–8.3)
PROTHROM AB SERPL-ACNC: 16.5 SEC — HIGH (ref 10–13.1)
RBC # BLD: 2.96 M/UL — LOW (ref 4.2–5.8)
RBC # FLD: 14.5 % — SIGNIFICANT CHANGE UP (ref 10.3–14.5)
SODIUM SERPL-SCNC: 127 MMOL/L — LOW (ref 135–145)
WBC # BLD: 12.54 K/UL — HIGH (ref 3.8–10.5)
WBC # FLD AUTO: 12.54 K/UL — HIGH (ref 3.8–10.5)

## 2018-09-23 RX ORDER — POTASSIUM CHLORIDE 20 MEQ
10 PACKET (EA) ORAL
Qty: 0 | Refills: 0 | Status: COMPLETED | OUTPATIENT
Start: 2018-09-23 | End: 2018-09-23

## 2018-09-23 RX ORDER — POTASSIUM CHLORIDE 20 MEQ
40 PACKET (EA) ORAL ONCE
Qty: 0 | Refills: 0 | Status: COMPLETED | OUTPATIENT
Start: 2018-09-23 | End: 2018-09-23

## 2018-09-23 RX ORDER — SIMETHICONE 80 MG/1
80 TABLET, CHEWABLE ORAL ONCE
Qty: 0 | Refills: 0 | Status: COMPLETED | OUTPATIENT
Start: 2018-09-23 | End: 2018-09-23

## 2018-09-23 RX ADMIN — Medication 100 MILLIGRAM(S): at 05:09

## 2018-09-23 RX ADMIN — Medication 100 MILLIGRAM(S): at 21:07

## 2018-09-23 RX ADMIN — HEPARIN SODIUM 5000 UNIT(S): 5000 INJECTION INTRAVENOUS; SUBCUTANEOUS at 17:15

## 2018-09-23 RX ADMIN — Medication 100 MILLIEQUIVALENT(S): at 10:40

## 2018-09-23 RX ADMIN — Medication 100 MILLIEQUIVALENT(S): at 09:23

## 2018-09-23 RX ADMIN — TAMSULOSIN HYDROCHLORIDE 0.4 MILLIGRAM(S): 0.4 CAPSULE ORAL at 21:07

## 2018-09-23 RX ADMIN — Medication 100 MILLIGRAM(S): at 12:29

## 2018-09-23 RX ADMIN — HEPARIN SODIUM 5000 UNIT(S): 5000 INJECTION INTRAVENOUS; SUBCUTANEOUS at 05:09

## 2018-09-23 RX ADMIN — SENNA PLUS 2 TABLET(S): 8.6 TABLET ORAL at 21:07

## 2018-09-23 RX ADMIN — Medication 100 MILLIEQUIVALENT(S): at 12:29

## 2018-09-23 RX ADMIN — Medication 40 MILLIEQUIVALENT(S): at 09:24

## 2018-09-23 RX ADMIN — Medication 80 MILLIGRAM(S): at 17:15

## 2018-09-23 RX ADMIN — Medication 80 MILLIGRAM(S): at 05:09

## 2018-09-23 RX ADMIN — SPIRONOLACTONE 100 MILLIGRAM(S): 25 TABLET, FILM COATED ORAL at 05:09

## 2018-09-23 RX ADMIN — Medication 1 MILLIGRAM(S): at 12:29

## 2018-09-23 RX ADMIN — LACTULOSE 20 GRAM(S): 10 SOLUTION ORAL at 17:15

## 2018-09-23 RX ADMIN — PREGABALIN 500 MICROGRAM(S): 225 CAPSULE ORAL at 12:29

## 2018-09-23 RX ADMIN — SIMETHICONE 80 MILLIGRAM(S): 80 TABLET, CHEWABLE ORAL at 05:09

## 2018-09-23 RX ADMIN — LACTULOSE 20 GRAM(S): 10 SOLUTION ORAL at 05:09

## 2018-09-23 NOTE — PROVIDER CONTACT NOTE (OTHER) - ASSESSMENT
pt Aox4. pt has knapp in place for retention. Leaking noted around Knapp. aspirate inflated balloon -10ml. Reinflate balloon to 10 ml sterile water. Urine draining.

## 2018-09-23 NOTE — PROVIDER CONTACT NOTE (OTHER) - ACTION/TREATMENT ORDERED:
BEBA Estrella made aware; per BEBA Estrella inflate balloon with 10 ml sterile water with total of 20ml. will continue to monitor.

## 2018-09-23 NOTE — PROVIDER CONTACT NOTE (CRITICAL VALUE NOTIFICATION) - SITUATION
Sodium 120
INR 5.14 & PT 60
Patient admitted w/ severe sepsis, ascites, alcoholic hepatitis/cirrhosis. On oral lasix 80 Q12.
Sodium 114
Sodium 116
Sodium 120
Serum Sodium 120

## 2018-09-23 NOTE — PROVIDER CONTACT NOTE (CRITICAL VALUE NOTIFICATION) - ACTION/TREATMENT ORDERED:
NP contacted and aware. BMP ordered for 6pm. Will continue to monitor.
Supplement with 40meqs orally and 3 runs 10meqs IV. Repeat BMP.
NP aware, awaiting orders. Will endorse to oncoming RN. Will continue to maintain safety.
NP contacted and aware. Repeat BMP scheduled for 22:00. Will continue to monitor.
NP notified and made aware. will continue to monitor.
As per PA, give AM dose of lasix now. Repeat BMP scheduled for 06:00. Will continue to monitor.
NP contacted and aware. No new orders. Will continue to monitor.
NP contacted and aware. Repeat BMP scheduled for 18:00. Will continue to monitor.

## 2018-09-23 NOTE — PROVIDER CONTACT NOTE (CRITICAL VALUE NOTIFICATION) - PERSON GIVING RESULT:
francisco Wheeler
Baltazar Espino (carrie)
Daniela Wheeler
Jaime Wheeler
Maximino Araiza/ Lab
Rajendra - Lab
Rajendra, CHEM LAB
Pravin Araiza (carrie)

## 2018-09-23 NOTE — PROVIDER CONTACT NOTE (CRITICAL VALUE NOTIFICATION) - NAME OF MD/NP/PA/DO NOTIFIED:
BEBA Patel
Na + 120
Analilia, NP
BEBA Patel
Jannie Toney, NP
Jennifer WILLSON
Loraine Patel NP
NP Simón

## 2018-09-24 LAB
ANION GAP SERPL CALC-SCNC: 8 MMOL/L — SIGNIFICANT CHANGE UP (ref 5–17)
ANION GAP SERPL CALC-SCNC: 8 MMOL/L — SIGNIFICANT CHANGE UP (ref 5–17)
BUN SERPL-MCNC: 12 MG/DL — SIGNIFICANT CHANGE UP (ref 7–23)
BUN SERPL-MCNC: 13 MG/DL — SIGNIFICANT CHANGE UP (ref 7–23)
CALCIUM SERPL-MCNC: 8.5 MG/DL — SIGNIFICANT CHANGE UP (ref 8.4–10.5)
CALCIUM SERPL-MCNC: 8.6 MG/DL — SIGNIFICANT CHANGE UP (ref 8.4–10.5)
CHLORIDE SERPL-SCNC: 89 MMOL/L — LOW (ref 96–108)
CHLORIDE SERPL-SCNC: 89 MMOL/L — LOW (ref 96–108)
CO2 SERPL-SCNC: 26 MMOL/L — SIGNIFICANT CHANGE UP (ref 22–31)
CO2 SERPL-SCNC: 26 MMOL/L — SIGNIFICANT CHANGE UP (ref 22–31)
CREAT SERPL-MCNC: 0.78 MG/DL — SIGNIFICANT CHANGE UP (ref 0.5–1.3)
CREAT SERPL-MCNC: 0.81 MG/DL — SIGNIFICANT CHANGE UP (ref 0.5–1.3)
GLUCOSE SERPL-MCNC: 111 MG/DL — HIGH (ref 70–99)
GLUCOSE SERPL-MCNC: 86 MG/DL — SIGNIFICANT CHANGE UP (ref 70–99)
HCT VFR BLD CALC: 30 % — LOW (ref 39–50)
HGB BLD-MCNC: 10.5 G/DL — LOW (ref 13–17)
MAGNESIUM SERPL-MCNC: 2 MG/DL — SIGNIFICANT CHANGE UP (ref 1.6–2.6)
MCHC RBC-ENTMCNC: 35 GM/DL — SIGNIFICANT CHANGE UP (ref 32–36)
MCHC RBC-ENTMCNC: 36.3 PG — HIGH (ref 27–34)
MCV RBC AUTO: 103.8 FL — HIGH (ref 80–100)
PLATELET # BLD AUTO: 128 K/UL — LOW (ref 150–400)
POTASSIUM SERPL-MCNC: 3.4 MMOL/L — LOW (ref 3.5–5.3)
POTASSIUM SERPL-MCNC: 3.9 MMOL/L — SIGNIFICANT CHANGE UP (ref 3.5–5.3)
POTASSIUM SERPL-SCNC: 3.4 MMOL/L — LOW (ref 3.5–5.3)
POTASSIUM SERPL-SCNC: 3.9 MMOL/L — SIGNIFICANT CHANGE UP (ref 3.5–5.3)
RBC # BLD: 2.89 M/UL — LOW (ref 4.2–5.8)
RBC # FLD: 14.6 % — HIGH (ref 10.3–14.5)
SODIUM SERPL-SCNC: 123 MMOL/L — LOW (ref 135–145)
SODIUM SERPL-SCNC: 123 MMOL/L — LOW (ref 135–145)
WBC # BLD: 12.28 K/UL — HIGH (ref 3.8–10.5)
WBC # FLD AUTO: 12.28 K/UL — HIGH (ref 3.8–10.5)

## 2018-09-24 RX ORDER — ONDANSETRON 8 MG/1
4 TABLET, FILM COATED ORAL ONCE
Qty: 0 | Refills: 0 | Status: COMPLETED | OUTPATIENT
Start: 2018-09-24 | End: 2018-09-24

## 2018-09-24 RX ORDER — POTASSIUM CHLORIDE 20 MEQ
20 PACKET (EA) ORAL ONCE
Qty: 0 | Refills: 0 | Status: COMPLETED | OUTPATIENT
Start: 2018-09-24 | End: 2018-09-24

## 2018-09-24 RX ORDER — SPIRONOLACTONE 25 MG/1
50 TABLET, FILM COATED ORAL DAILY
Qty: 0 | Refills: 0 | Status: DISCONTINUED | OUTPATIENT
Start: 2018-09-25 | End: 2018-09-25

## 2018-09-24 RX ORDER — FUROSEMIDE 40 MG
80 TABLET ORAL DAILY
Qty: 0 | Refills: 0 | Status: DISCONTINUED | OUTPATIENT
Start: 2018-09-25 | End: 2018-09-25

## 2018-09-24 RX ADMIN — Medication 100 MILLIGRAM(S): at 05:29

## 2018-09-24 RX ADMIN — Medication 1 MILLIGRAM(S): at 11:26

## 2018-09-24 RX ADMIN — Medication 100 MILLIGRAM(S): at 11:26

## 2018-09-24 RX ADMIN — HEPARIN SODIUM 5000 UNIT(S): 5000 INJECTION INTRAVENOUS; SUBCUTANEOUS at 17:24

## 2018-09-24 RX ADMIN — SPIRONOLACTONE 100 MILLIGRAM(S): 25 TABLET, FILM COATED ORAL at 05:29

## 2018-09-24 RX ADMIN — ONDANSETRON 4 MILLIGRAM(S): 8 TABLET, FILM COATED ORAL at 10:14

## 2018-09-24 RX ADMIN — Medication 100 MILLIGRAM(S): at 22:13

## 2018-09-24 RX ADMIN — HEPARIN SODIUM 5000 UNIT(S): 5000 INJECTION INTRAVENOUS; SUBCUTANEOUS at 05:28

## 2018-09-24 RX ADMIN — PREGABALIN 500 MICROGRAM(S): 225 CAPSULE ORAL at 11:26

## 2018-09-24 RX ADMIN — LACTULOSE 20 GRAM(S): 10 SOLUTION ORAL at 17:24

## 2018-09-24 RX ADMIN — TAMSULOSIN HYDROCHLORIDE 0.4 MILLIGRAM(S): 0.4 CAPSULE ORAL at 22:13

## 2018-09-24 RX ADMIN — Medication 80 MILLIGRAM(S): at 05:28

## 2018-09-24 RX ADMIN — Medication 20 MILLIEQUIVALENT(S): at 09:35

## 2018-09-24 RX ADMIN — LACTULOSE 20 GRAM(S): 10 SOLUTION ORAL at 05:29

## 2018-09-24 RX ADMIN — SENNA PLUS 2 TABLET(S): 8.6 TABLET ORAL at 22:13

## 2018-09-24 NOTE — PROGRESS NOTE ADULT - ASSESSMENT
52M pmhx of Afib on xarelto, alcoholic hepatitis with cirrhosis and ascities with severe hyponatremia and fluid overloaded    1- hyponatremia  2- ascites  3- alcoholism   4- anemia   na lower    lasix to change 40 mg po qd and aldactone 50 mg daily   k is steady   trend hb

## 2018-09-24 NOTE — PROGRESS NOTE ADULT - SUBJECTIVE AND OBJECTIVE BOX
denies acute pain, nausea or vomiting    Vital Signs Last 24 Hrs  T(C): 36.6 (24 Sep 2018 12:09), Max: 37.2 (23 Sep 2018 20:40)  T(F): 97.8 (24 Sep 2018 12:09), Max: 98.9 (23 Sep 2018 20:40)  HR: 114 (24 Sep 2018 12:09) (97 - 114)  BP: 107/67 (24 Sep 2018 12:09) (102/60 - 107/67)  BP(mean): --  RR: 18 (24 Sep 2018 12:09) (18 - 18)  SpO2: 95% (24 Sep 2018 12:09) (92% - 95%)    GENERAL: NAD  HEAD:  Atraumatic, Normocephalic  EYES: EOMI  NECK: Supple, No JVD, No LAD  CHEST/LUNG: mild dec breath sounds bilaterally; No wheeze  HEART: Regular rate and rhythm; No murmurs, rubs, or gallops  ABDOMEN: (+)distension, no focal tenderness  EXTREMITIES:  2+ Peripheral Pulses, mild tremors in hands when elbow extended.   SKIN: Anasarca with beefy red skin rash that is previously warm to touch, but now dissipating. almost normal skin color now, except mild erythema. (+)b/l LE edema  NEURO:  AAOx3, sleepy, but readily arousable, nonfocal CN/motor/sensory/reflexes, minimal tremors of upper extremities.     LABS:                        10.5   12.28 )-----------( 128      ( 24 Sep 2018 07:39 )             30.0     09-24    123<L>  |  89<L>  |  13  ----------------------------<  111<H>  3.9   |  26  |  0.81    Ca    8.6      24 Sep 2018 13:04  Mg     2.0     09-24    TPro  7.2  /  Alb  2.6<L>  /  TBili  3.9<H>  /  DBili  1.7<H>  /  AST  94<H>  /  ALT  55<H>  /  AlkPhos  125<H>  09-23    PT/INR - ( 23 Sep 2018 08:55 )   PT: 16.5 sec;   INR: 1.45 ratio         PTT - ( 23 Sep 2018 08:55 )  PTT:33.9 sec  CAPILLARY BLOOD GLUCOSE

## 2018-09-24 NOTE — PROGRESS NOTE ADULT - PROBLEM SELECTOR PLAN 10
unclear etiology  s/p knapp for 600 cc PVR initially.  failed TOV 9/17. 900cc residuals.  cont Flomax.   monitor for now.
SC heparin 5000 units q12h
unclear etiology  s/p knapp for 600 cc PVR initially.  failed TOV 9/17. 900cc residuals.  started Flomax.   monitor for now.

## 2018-09-24 NOTE — PROGRESS NOTE ADULT - PROBLEM SELECTOR PLAN 2
management per primary team and GI
management per primary team and GI
more likely 2' the edema rather than cellulitis  completed unasyn, short course  appreciate ID
more likely 2' the edema rather than cellulitis  completed unasyn, short course  appreciate ID  erythema resolved. leg elevation.
Hypervolemic hyponatremia likely 2/2 ascites  Appreciate renal  S/p lasix 20 mg IV x 1  Fluid restriction 1L  Strict I/Os
Hypervolemic hyponatremia likely 2/2 ascites  Appreciate renal  S/p lasix 20 mg IV x 1  Fluid restriction 1L  Strict I/Os
Hypervolemic hyponatremia likely 2/2 ascites  Appreciate renal  To get lasix 20 mg IV x 1  Fluid restrict 1L unless becomes hypotensive  Strict I/Os
Hypervolemic hyponatremia likely 2/2 ascites  Appreciate renal  cont IV lasix bid  Fluid restriction 1L  Strict I/Os
Hypervolemic hyponatremia likely 2/2 ascites  Appreciate renal  cont IV lasix bid  Fluid restriction 1L  Strict I/Os  Daily weights
management per primary team and GI
more likely 2' the edema rather than cellulitis  completed unasyn, short course  appreciate ID  erythema resolved. leg elevation.
more likely 2' the edema rather than cellulitis  completed unasyn, short course  appreciate ID  erythema resolved. leg elevation.
more likely 2' the edema rather than cellulitis  on unasyn, short course  appreciate ID
s/p abdominal paracentesis x 2.  Did not meet criteria for SBP  finished unasyn IV 9/16  ID f/u appreciated.
with volume overload  continue lasix 40mg IV twice a day and aldactone 100mg PO Daily  defer addition of thiazide diuretic in setting of hyponatremia
with volume overload  continue lasix 40mg IV twice a day and aldactone 100mg PO Daily  defer addition of thiazide diuretic in setting of hyponatremia
more likely 2' the edema rather than cellulitis  completed unasyn, short course  appreciate ID  erythema resolved. leg elevation.
more likely 2' the edema rather than cellulitis  on unasyn, short course  appreciate ID

## 2018-09-24 NOTE — PROGRESS NOTE ADULT - PROBLEM SELECTOR PLAN 9
on Librium 25mg Q12hrs with Ativan PRN if CIWA > 8.   tapered to Librium 25 mg QHS. now stable off.  no agitations.
Cont Librium 25mg Q12hrs with Ativan PRN CIWA > 8.   high risk for alcohol withdrawal.  not ready for taper today.
unclear etiology  s/p knapp for 600 cc PVR initially.  failed TOV 9/17. 900cc residuals.  cont Flomax.   monitor for now.
unclear etiology  s/p knapp for 600 cc PVR initially.  failed TOV 9/17. 900cc residuals.  cont Flomax.   monitor for now.
Cont Librium 25mg Q12hrs with Ativan PRN CIWA > 8.   high risk for alcohol withdrawal.
Cont Librium 25mg Q12hrs with Ativan PRN CIWA > 8.   high risk for alcohol withdrawal.  not ready for taper yet.
Cont Librium 25mg Q12hrs with Ativan PRN CIWA > 8.   high risk for alcohol withdrawal.  not ready for taper yet.
on Librium 25mg Q12hrs with Ativan PRN CIWA > 8.   will dec to Librium 25 mg QHS. monitor for agitation.
on Librium 25mg Q12hrs with Ativan PRN if CIWA > 8.   tapered to Librium 25 mg QHS. now stable off.  no agitations.
on Librium 25mg Q12hrs with Ativan PRN if CIWA > 8.   will discontinue Librium 25 mg QHS. monitor for agitation.  doing well so far.
on Librium 25mg Q12hrs with Ativan PRN if CIWA > 8.   will discontinue Librium 25 mg QHS. monitor for agitation.  doing well so far.
unclear etiology  s/p knapp for 600 cc PVR initially.  failed TOV 9/17.   cont Flomax.   monitor for now.
unclear etiology  s/p knapp for 600 cc PVR initially.  failed TOV 9/17. 900cc residuals.  cont Flomax.   monitor for now.

## 2018-09-24 NOTE — PROGRESS NOTE ADULT - PROBLEM SELECTOR PROBLEM 10
Urinary retention
Need for prophylactic measure
Urinary retention

## 2018-09-24 NOTE — PROGRESS NOTE ADULT - PROBLEM SELECTOR PROBLEM 4
Hyponatremia
Alcoholic hepatitis
Hyponatremia
Severe sepsis
Hyponatremia

## 2018-09-24 NOTE — PROGRESS NOTE ADULT - PROBLEM SELECTOR PLAN 5
s/p 5.8 L paracentesis 9/10
s/p 5.8 L paracentesis 9/10  cont lasix/aldactone  going for repeat abd U/S to assess feasbility of repeat paracentesis
s/p 5.8 L paracentesis 9/10 and 4.7L paracentesis 9/21  cont lasix/aldactone  going for repeat abd U/S to assess feasbility of repeat paracentesis
Continue B12 and Folic acid  No signs of active bleeding, never had screening EGD for varices. Brown stool on rectal.
s/p 5.8 L paracentesis 9/10
s/p 5.8 L paracentesis 9/10  cont lasix/aldactone  going for repeat abd U/S to assess feasbility of repeat paracentesis
s/p 5.8 L paracentesis 9/10 and 4.7L paracentesis 9/21  lasix/aldactone reduced 9/24 2' hyponatremia
s/p 5.8 L paracentesis 9/10  cont lasix/aldactone

## 2018-09-24 NOTE — PROGRESS NOTE ADULT - PROBLEM SELECTOR PLAN 1
does not meet criteria  s/p abdominal paracentesis x 2. no peritonitis. low cell count.  finished unasyn IV 9/16  ID f/u appreciated.
mrsa screen neg  blood neg  wbc stable  some of this is chronic from persistent edema causing a dermatitis  needs diuresis  will complete course of unasyn til 9/16
mrsa screen neg  blood neg  wbc stable  some of this is chronic from persistent edema causing a dermatitis  needs diuresis  will complete course of unasyn til 9/16   d/w girlfriend at bedside
s/p abdominal paracentesis x 2.  Did not meet criteria for SBP  finished unasyn IV 9/16  ID f/u appreciated.
Cont Ceftriaxone( Would prefer to wait for paracentesis however pt was hypotensive on arrival and has severe sepsis) to cover for SBP and strep cellulitis of leg (low suspicion).   Monitor blood cultures
Cont Ceftriaxone( Would prefer to wait for paracentesis however pt was hypotensive on arrival and has severe sepsis) to cover for SBP and strep cellulitis of leg (low suspicion).   Monitor blood cultures
Cont Ceftriaxone( Would prefer to wait for paracentesis however pt was hypotensive on arrival and has severe sepsis) to cover for SBP and strep cellulitis of leg (low suspicion).   Monitor blood cultures (-) so far
Cont Ceftriaxone( Would prefer to wait for paracentesis however pt was hypotensive on arrival and has severe sepsis) to cover for SBP and strep cellulitis of leg (low suspicion).   Monitor blood cultures (-) so far  resolved
Cont Ceftriaxone( Would prefer to wait for paracentesis however pt was hypotensive on arrival and has severe sepsis) to cover for SBP and strep cellulitis of leg (low suspicion).   Monitor blood cultures (-) so far  resolved
Hypervolemic hyponatremia likely 2/2 ascites upon admission; now likely a result of overdiureses; lasix reduced to 80 mg daily and aldactone reduced to 50 mg daily  Appreciate renal  Fluid restriction 1L  Strict I/Os  Daily weights
does not meet criteria  s/p abdominal paracentesis. no peritonitis. low cell count.  c/w abx (unasyn IV until 9/16)  ID f/u appreciated.
does not meet criteria  s/p abdominal paracentesis. no peritonitis. low cell count.  finished unasyn IV 9/16  ID f/u appreciated.
does not meet criteria  will ask ID about lifelong prophylaxis
mrsa screen neg  blood neg  wbc decreased  some of this is chronic from persistent edema causing a dermatitis  needs diuresis  will complete course of unasyn for now  d/w girlfriend at bedside
mrsa screen neg  blood neg  wbc decreased  some of this is chronic from persistent edema causing a dermatitis  needs diuresis  will complete course of unasyn for now til sept 15  keep elevated while seated  d/w girlfriend at bedside
mrsa screen neg  blood neg  wbc stable  some of this is chronic from persistent edema causing a dermatitis  needs diuresis  antibiotic course completed  monitor off antibx now
sodium drifting down slowly  Patient's family member at bedside reports poor PO intake x 24 hours due to increased lethargy. (on rifaximin. ?repeat ammonia level)  encourage PO intake but with 1L PO free water restriction  dietary supplementation as per nutrition
sodium remains low but stable  Improved PO intake over last 24 hours as per patient.  encourage PO intake but with 1L PO free water restriction  dietary supplementation as per nutrition
does not meet criteria  s/p abdominal paracentesis. no peritonitis. low cell count.  finished unasyn IV 9/16  ID f/u appreciated.
does not meet criteria  will ask ID about lifelong prophylaxis  s/p abdominal paracentesis. no peritonitis. low cell count.

## 2018-09-24 NOTE — PROGRESS NOTE ADULT - PROBLEM SELECTOR PROBLEM 1
R/O Cellulitis
R/O Cellulitis
SBP (spontaneous bacterial peritonitis)
SBP (spontaneous bacterial peritonitis)
Hyponatremia
R/O Cellulitis
SBP (spontaneous bacterial peritonitis)
Severe sepsis
SBP (spontaneous bacterial peritonitis)
SBP (spontaneous bacterial peritonitis)

## 2018-09-24 NOTE — PROGRESS NOTE ADULT - PROBLEM SELECTOR PLAN 8
off Xarelto in setting of recurrent nosebleeds and INR >4-5.  Holding Metoprolol in setting of severe sepsis and hypotension.  the pt and gf both don't want to resume Xarelto.  risk benefits of CVA preventions vs. bleeding risk explained.   All questions answered.
Will hold Xarelto in setting of recurrent nosebleeds and INR >4-5.  Holding Metoprolol in setting of severe sepsis and hypotension.  the pt and gf both don't want to resume Xarelto.  risk benefits of CVA preventions vs. bleeding risk explained.   All questions answered.
on Librium 25mg Q12hrs with Ativan PRN if CIWA > 8.   tapered to Librium 25 mg QHS. now stable off.  no agitations.
tapered off librium  no further withdrawal symptoms
Will hold Xarelto in setting of recurrent nosebleeds and INR >4-5.  Holding Metoprolol in setting of severe sepsis and hypotension.  the pt and gf both don't want to resume Xarelto.  risk benefits of CVA preventions vs. bleeding risk explained.   All questions answered.
Will hold Xarelto in setting of recurrent nosebleeds and INR >5.  Holding Metoprolol in setting of severe sepsis and hypotension.
on Librium 25mg Q12hrs with Ativan PRN if CIWA > 8.   tapered to Librium 25 mg QHS. now stable off.  no agitations.
tapered off librium  no further withdrawal symptoms
venous dopplers are negative for DVT
venous dopplers are pending

## 2018-09-24 NOTE — PROGRESS NOTE ADULT - PROBLEM SELECTOR PROBLEM 9
Alcohol abuse
Alcohol abuse
Urinary retention
Urinary retention
Alcohol abuse
Urinary retention
Urinary retention

## 2018-09-24 NOTE — PROGRESS NOTE ADULT - PROBLEM SELECTOR PROBLEM 2
Alcoholic hepatitis
Alcoholic hepatitis
Erythema of lower extremity
Erythema of lower extremity
Alcoholic hepatitis
Ascites
Ascites
Erythema of lower extremity
Hyponatremia
SBP (spontaneous bacterial peritonitis)
Erythema of lower extremity
Erythema of lower extremity

## 2018-09-24 NOTE — PROGRESS NOTE ADULT - SUBJECTIVE AND OBJECTIVE BOX
[facility]   [phone]  RENAL FOLLOW UP NOTE  --------------------------------------------------------------------------------  Chief Complaint:    24 hour events/subjective:    seen earlier. no new c/o    PAST HISTORY  --------------------------------------------------------------------------------  No significant changes to PMH, PSH, FHx, SHx, unless otherwise noted    ALLERGIES & MEDICATIONS  --------------------------------------------------------------------------------  Allergies    No Known Allergies    Intolerances      Standing Inpatient Medications  cyanocobalamin 500 MICROGram(s) Oral daily  docusate sodium 100 milliGRAM(s) Oral three times a day  folic acid 1 milliGRAM(s) Oral daily  heparin  Injectable 5000 Unit(s) SubCutaneous every 12 hours  influenza   Vaccine 0.5 milliLiter(s) IntraMuscular once  lactulose Syrup 20 Gram(s) Oral two times a day  rifaximin 550 milliGRAM(s) Oral two times a day  senna 2 Tablet(s) Oral at bedtime  sorbitol 70%/mineral oil/magnesium hydroxide/glycerin Enema 120 milliLiter(s) Rectal once  tamsulosin 0.4 milliGRAM(s) Oral at bedtime  thiamine 100 milliGRAM(s) Oral daily    PRN Inpatient Medications      REVIEW OF SYSTEMS  --------------------------------------------------------------------------------    Gen: denies fatigue, fevers/chills,  CVS: denies chest pain/palpitations  Resp: denies SOB/Cough  GI: Denies N/V/Abd pain  : Denies dysuria/oliguria/hematuria    All other systems were reviewed and are negative, except as noted.    VITALS/PHYSICAL EXAM  --------------------------------------------------------------------------------  T(C): 36.6 (09-24-18 @ 12:09), Max: 37.2 (09-23-18 @ 20:40)  HR: 102 (09-24-18 @ 16:10) (97 - 114)  BP: 107/67 (09-24-18 @ 12:09) (105/61 - 107/67)  RR: 18 (09-24-18 @ 12:09) (18 - 18)  SpO2: 93% (09-24-18 @ 16:10) (92% - 95%)  Wt(kg): --        09-23-18 @ 07:01  -  09-24-18 @ 07:00  --------------------------------------------------------  IN: 480 mL / OUT: 1200 mL / NET: -720 mL    09-24-18 @ 07:01  -  09-24-18 @ 19:32  --------------------------------------------------------  IN: 480 mL / OUT: 575 mL / NET: -95 mL      Physical Exam:  	    Physical Exam:  	Gen: more communicative   	no jvd , supple neck,   	Pulm: decrease bs  no rales or ronchi or wheezing  	CV: RRR, S1S2; no rub  	Abd: +BS, soft, + ascites   	: No suprapubic tenderness  	UE: Warm, no cyanosis  no clubbing,  no edema; no asterixis  	LE: Warm, no cyanosis  no clubbing,  significantly decreased 1++  edema    LABS/STUDIES  --------------------------------------------------------------------------------              10.5   12.28 >-----------<  128      [09-24-18 @ 07:39]              30.0     123  |  89  |  13  ----------------------------<  111      [09-24-18 @ 13:04]  3.9   |  26  |  0.81        Ca     8.6     [09-24-18 @ 13:04]      Mg     2.0     [09-24-18 @ 06:23]    TPro  7.2  /  Alb  2.6  /  TBili  3.9  /  DBili  1.7  /  AST  94  /  ALT  55  /  AlkPhos  125  [09-23-18 @ 06:37]    PT/INR: PT 16.5 , INR 1.45       [09-23-18 @ 08:55]  PTT: 33.9       [09-23-18 @ 08:55]      Creatinine Trend:  SCr 0.81 [09-24 @ 13:04]  SCr 0.78 [09-24 @ 06:23]  SCr 0.82 [09-23 @ 06:37]  SCr 0.83 [09-22 @ 05:35]  SCr 0.86 [09-21 @ 06:28]              Urinalysis - [09-06-18 @ 01:50]      Color Brown / Appearance SL Turbid / SG 1.018 / pH 6.0      Gluc Negative / Ketone Negative  / Bili Moderate / Urobili 8       Blood Negative / Protein Trace / Leuk Est Negative / Nitrite Negative      RBC 0-2 / WBC 3-5 / Hyaline 0-2 / Gran  / Sq Epi  / Non Sq Epi OCC / Bacteria Few      Iron 67, TIBC 94, %sat 71      [09-08-18 @ 10:01]  Ferritin 1886      [09-08-18 @ 09:14]    LEANA: titer 1:160, pattern [id number]      [09-08-18 @ 09:14]

## 2018-09-24 NOTE — PROGRESS NOTE ADULT - PROBLEM SELECTOR PLAN 6
Cassandra's discriminant function is 258 which is confounded by use of Xarelto. Regardless, pt is not a transplant candidate as he is currently abusing ETOH, last drink was on morning of admission  Encourage alcohol cessation, patient has very poor prognosis at this point.  Holding off steroids for now  Appreciate GI  IV Lasix 40 mg bid switched to PO.  c/w Spironolactone to 100 mg qdaily  c/w rifaximin.
Cassandra's discriminant function is 258 which is confounded by use of Xarelto. Regardless, pt is not a transplant candidate as he is currently abusing ETOH, last drink was on morning of admission  Encourage alcohol cessation, patient has very poor prognosis at this point.  Holding off steroids for now  Appreciate GI
Cassandra's discriminant function is 258 which is confounded by use of Xarelto. Regardless, pt is not a transplant candidate as he is currently abusing ETOH, last drink was on morning of admission  Did not get steroids 2' concern for SBP  Appreciate GI  c/w rifaximin and lactulose.
Did not get steroids 2' concern for SBP  Appreciate GI  c/w rifaximin and lactulose.  monitor LFTs, coags
Cassandra's discriminant function is 258 which is confounded by use of Xarelto. Regardless, pt is not a transplant candidate as he is currently abusing ETOH, last drink was on morning of admission  Did not get steroids 2' concern for SBP  Appreciate GI  c/w rifaximin and lactulose.
Cassandra's discriminant function is 258 which is confounded by use of Xarelto. Regardless, pt is not a transplant candidate as he is currently abusing ETOH, last drink was on morning of admission  Encourage alcohol cessation, patient has very poor prognosis at this point.  Holding off steroids for now  Appreciate GI
Cassandra's discriminant function is 258 which is confounded by use of Xarelto. Regardless, pt is not a transplant candidate as he is currently abusing ETOH, last drink was on morning of admission  Encourage alcohol cessation, patient has very poor prognosis at this point.  Holding off steroids for now  Appreciate GI  IV Lasix 40 mg bid switched to PO.  c/w Spironolactone to 100 mg qdaily  c/w rifaximin.
Cassandra's discriminant function is 258 which is confounded by use of Xarelto. Regardless, pt is not a transplant candidate as he is currently abusing ETOH, last drink was on morning of admission  Encourage alcohol cessation, patient has very poor prognosis at this point.  Holding off steroids for now  Appreciate GI  cont IV Lasix 40 mg bid, increase Spironolactone to 100 mg qdaily  added rifaximin.
Cassandra's discriminant function is 258 which is confounded by use of Xarelto. Regardless, pt is not a transplant candidate as he is currently abusing ETOH, last drink was on morning of admission  Encourage alcohol cessation, patient has very poor prognosis at this point.  Holding off steroids for now  Appreciate GI  cont IV Lasix 40 mg bid, increase Spironolactone to 100 mg qdaily  c/w rifaximin.
Cassandra's discriminant function is 258 which is confounded by use of Xarelto. Regardless, pt is not a transplant candidate as he is currently abusing ETOH, last drink was on morning of admission  Encourage alcohol cessation, patient has very poor prognosis at this point.  Holding off steroids for now  Appreciate GI  cont IV Lasix 40 mg bid, increase Spironolactone to 100 mg qdaily  c/w rifaximin.
Currently regular on exam, check EKG  Will hold Xarelto in setting of recurrent nosebleeds and INR >5.  Holding Metoprolol in setting of severe sepsis and hypotension.
Did not get steroids 2' concern for SBP  Appreciate GI  c/w rifaximin and lactulose.  monitor LFTs, coags
Will hold Xarelto in setting of recurrent nosebleeds and INR >5.  Holding Metoprolol in setting of severe sepsis and hypotension.

## 2018-09-24 NOTE — PROGRESS NOTE ADULT - PROBLEM SELECTOR PLAN 3
likely 2' SBP  Blood cultures (-) so far  resolved
s/p paracentesis  PMN < 250  cx negative  pt was on antibx  further management  per primary team and GI
s/p paracentesis  PMN < 250  cx negative  pt was on antibx  further management  per primary team and GI
Diagnostic paracentesis before therapeutic paracentesis for ascites 2/2 alcoholic hepatitis.  On hold 2' coagulopathy which is 2' combination of being on xarelto and his alcoholic hepatitis
Diagnostic paracentesis before therapeutic paracentesis for ascites 2/2 alcoholic hepatitis.  On hold 2' coagulopathy which is 2' combination of being on xarelto and his alcoholic hepatitis  s/p VIt K
Diagnostic paracentesis before therapeutic paracentesis for ascites 2/2 alcoholic hepatitis.  On hold 2' coagulopathy which is 2' combination of being on xarelto and his alcoholic hepatitis  s/p VIt K  U/S today, paracentesis likely Tuesday
likely 2' SBP  Blood cultures (-) so far  resolved
more likely 2' the edema rather than cellulitis  completed unasyn, short course  appreciate ID
multifactorial in setting of GI losses (on rifaxamin) and IV diuresis  appropriate repletions ordered by primary team  repeat BMP with magnesium level in AM
s/p paracentesis  PMN < 250  cx negative  pt was on antibx  further management  per primary team and GI
s/p paracentesis  PMN < 250  pt was on antibx  d/w GI regarding consideration of SBP prophylaxis once completed unasyn course  further management  per primary team and GI
s/p paracentesis  PMN < 250  pt was on antibx  further management  per primary team and GI
likely 2' SBP  Blood cultures (-) so far  resolved

## 2018-09-24 NOTE — PROGRESS NOTE ADULT - PROBLEM SELECTOR PLAN 7
Continue B12 and Folic acid  No signs of active bleeding, never had screening EGD for varices. Brown stool on rectal.
Continue B12 and Folic acid  No signs of active bleeding, never had screening EGD for varices. Brown stool on rectal.
Holding xarelto   Holding Metoprolol in setting of severe sepsis and hypotension.  the pt and gf both don't want to resume Xarelto.
off Xarelto in setting of recurrent nosebleeds and INR >4-5.  Holding Metoprolol in setting of severe sepsis and hypotension.  the pt and gf both don't want to resume Xarelto.  risk benefits of CVA preventions vs. bleeding risk explained.   All questions answered.
Cont Librium 25mg Q8hrs with Ativan PRN CIWA > 8.   high risk for alcohol withdrawal.
Continue B12 and Folic acid  No signs of active bleeding, never had screening EGD for varices. Brown stool on rectal.
High risk for W/D. CIWA is 0 at this time.  Place on CIWA calculation protocol, but received 25mg PO x 1 in ED.  Will cont Librium 25mg Q8hrs with Ativan PRN CIWA > 8.   high risk for alcohol withdrawal.
High risk for W/D. CIWA is 0 at this time.  Place on CIWA calculation protocol, but received 25mg PO x 1 in ED.  Will cont Librium 25mg Q8hrs with Ativan PRN CIWA > 8.   high risk for alcohol withdrawal.    #Dash diet, fluid restricted 1L  Keep mg >2 k >4  NO A/C    #LE edema  Check dopplers b/l, given cirrhosis is also prothrombotic.
Holding xarelto   Holding Metoprolol in setting of severe sepsis and hypotension.  the pt and gf both don't want to resume Xarelto.
off Xarelto in setting of recurrent nosebleeds and INR >4-5.  Holding Metoprolol in setting of severe sepsis and hypotension.  the pt and gf both don't want to resume Xarelto.  risk benefits of CVA preventions vs. bleeding risk explained.   All questions answered.

## 2018-09-24 NOTE — PROGRESS NOTE ADULT - PROBLEM SELECTOR PROBLEM 5
Ascites
Macrocytic anemia
Ascites

## 2018-09-24 NOTE — PROGRESS NOTE ADULT - PROBLEM SELECTOR PROBLEM 7
Macrocytic anemia
Macrocytic anemia
Afib
Alcohol abuse
Macrocytic anemia

## 2018-09-24 NOTE — PROGRESS NOTE ADULT - PROBLEM SELECTOR PROBLEM 6
Alcoholic hepatitis
Afib
Alcoholic hepatitis

## 2018-09-24 NOTE — PROGRESS NOTE ADULT - PROBLEM SELECTOR PROBLEM 8
Afib
Afib
Alcohol abuse
Alcohol abuse
Afib
Alcohol abuse
Alcohol abuse
Swelling of lower limb

## 2018-09-24 NOTE — CHART NOTE - NSCHARTNOTEFT_GEN_A_CORE
Patient seen for nutrition follow-up on 6TOW.     Chart reviewed, events noted. This is a 52 year old M admitted with alcoholic hepatitis, cirrhosis. Now s/p paracentesis on 9/10 & 9/21. Severe sepsis on admit resolved. Pt with macrocytic anemia and hyponatremia. Erythema of lower extremity. Pt not a transplant candidate given active alcohol use. Hypervolemic hyponatremia likely 2/2 ascites. Placed on 1000ml fluid restriction and diet liberalized per team. Nephrology following.     Source: Patient [x]    Family [ ]     other [ ]    Diet : Regular+1000ml fluid restriction + Ensure Enlive 2x per day + pro-source 2x     Patient reports [ ] nausea  [ ] vomiting [ ] diarrhea [ ] constipation  [ ]chewing problems [ ] swallowing issues  [ ] other: Pt denies any acute GI distress. Last BM noted on 9/21.      PO intake:  < 50% [ ] 50-75% [ ]   % [ x]  other : Pt reports good PO intake, completing at least of meals. Reports he has been eating protein source on tray and will sometimes leave side if he is not hungry. Is drinking Ensure Enlive 1x per day, pt encouraged to try to drink nutrition supplement between meals for added protein. Offered to obtain food preferences but pt declined, reports significant other helps fill out menu selections.      Source for PO intake [x ] Patient [ ] family [ ] chart [ ] staff [ ] other    Current Weight: weight changes likely related to fluid shifts, pt s/p Paracentesis (9/21) with 4.7  fluid removal   184 pounds (9/13)  168 pounds (9/17)  181 pounds (9/21)   165 pounds (9/22)     Pertinent Medications: MEDICATIONS  (STANDING):  cyanocobalamin 500 MICROGram(s) Oral daily  docusate sodium 100 milliGRAM(s) Oral three times a day  folic acid 1 milliGRAM(s) Oral daily  heparin  Injectable 5000 Unit(s) SubCutaneous every 12 hours  influenza   Vaccine 0.5 milliLiter(s) IntraMuscular once  lactulose Syrup 20 Gram(s) Oral two times a day  rifaximin 550 milliGRAM(s) Oral two times a day  senna 2 Tablet(s) Oral at bedtime  tamsulosin 0.4 milliGRAM(s) Oral at bedtime  thiamine 100 milliGRAM(s) Oral daily    MEDICATIONS  (PRN):    Pertinent Labs:  09-24 Na123 mmol/L<L> Glu 86 mg/dL K+ 3.4 mmol/L<L> Cr  0.78 mg/dL BUN 12 mg/dL 09-23 Alb 2.6 g/dL<L>      Skin: no pressure injuries noted per nursing flow sheets  Edema: +2 left/right foot per nursing flow sheets       Estimated Needs:   [x ] no change since previous assessment  [ ] recalculated:       Previous Nutrition Diagnosis:    [x ] Increased Nutrient Needs        Nutrition Diagnosis is [x ] ongoing  [ ] resolved [ ] not applicable         New Nutrition Diagnosis: [x ] not applicable       Interventions:     Recommend  1. Continue current diet as tolerated. Encourage PO intake of protein-rich nutrient dense foods.   2. Continue Ensure Enlive 2x per day + pro-source 2x per day.       Monitoring and Evaluation:     [x ] PO intake [ x] Tolerance to diet prescription [x ] weights [ x] follow up per protocol    [x ] other: RD to remain available and follow-up as medically appropriate. Angela Zepeda RD, CDN, Pager # [phone]

## 2018-09-24 NOTE — CHART NOTE - NSCHARTNOTEFT_GEN_A_CORE
patient without bowel movement since 9/21/18 despite bowel regimen. Pt requesting help for BM. Discussed with Dr. Mohr who recommended as SMOG enema x 1. Will follow.

## 2018-09-25 ENCOUNTER — TRANSCRIPTION ENCOUNTER (OUTPATIENT)
Age: 53
End: 2018-09-25

## 2018-09-25 VITALS
DIASTOLIC BLOOD PRESSURE: 67 MMHG | SYSTOLIC BLOOD PRESSURE: 111 MMHG | HEART RATE: 102 BPM | TEMPERATURE: 98 F | RESPIRATION RATE: 18 BRPM | OXYGEN SATURATION: 96 %

## 2018-09-25 PROBLEM — K70.9 ALCOHOLIC LIVER DISEASE, UNSPECIFIED: Chronic | Status: ACTIVE | Noted: 2018-09-05

## 2018-09-25 PROBLEM — I48.91 UNSPECIFIED ATRIAL FIBRILLATION: Chronic | Status: ACTIVE | Noted: 2018-09-05

## 2018-09-25 LAB
ANION GAP SERPL CALC-SCNC: 9 MMOL/L — SIGNIFICANT CHANGE UP (ref 5–17)
BUN SERPL-MCNC: 14 MG/DL — SIGNIFICANT CHANGE UP (ref 7–23)
CALCIUM SERPL-MCNC: 8.6 MG/DL — SIGNIFICANT CHANGE UP (ref 8.4–10.5)
CHLORIDE SERPL-SCNC: 92 MMOL/L — LOW (ref 96–108)
CHLORIDE UR-SCNC: <35 MMOL/L — SIGNIFICANT CHANGE UP
CO2 SERPL-SCNC: 25 MMOL/L — SIGNIFICANT CHANGE UP (ref 22–31)
CREAT SERPL-MCNC: 0.77 MG/DL — SIGNIFICANT CHANGE UP (ref 0.5–1.3)
GLUCOSE SERPL-MCNC: 84 MG/DL — SIGNIFICANT CHANGE UP (ref 70–99)
HCT VFR BLD CALC: 29 % — LOW (ref 39–50)
HGB BLD-MCNC: 10.1 G/DL — LOW (ref 13–17)
MCHC RBC-ENTMCNC: 34.8 GM/DL — SIGNIFICANT CHANGE UP (ref 32–36)
MCHC RBC-ENTMCNC: 36.6 PG — HIGH (ref 27–34)
MCV RBC AUTO: 105.1 FL — HIGH (ref 80–100)
OSMOLALITY UR: 376 MOS/KG — SIGNIFICANT CHANGE UP (ref 300–900)
PLATELET # BLD AUTO: 131 K/UL — LOW (ref 150–400)
POTASSIUM SERPL-MCNC: 3.9 MMOL/L — SIGNIFICANT CHANGE UP (ref 3.5–5.3)
POTASSIUM SERPL-SCNC: 3.9 MMOL/L — SIGNIFICANT CHANGE UP (ref 3.5–5.3)
POTASSIUM UR-SCNC: 43 MMOL/L — SIGNIFICANT CHANGE UP
RBC # BLD: 2.76 M/UL — LOW (ref 4.2–5.8)
RBC # FLD: 14.3 % — SIGNIFICANT CHANGE UP (ref 10.3–14.5)
SODIUM SERPL-SCNC: 126 MMOL/L — LOW (ref 135–145)
SODIUM UR-SCNC: <20 MMOL/L — SIGNIFICANT CHANGE UP
WBC # BLD: 12.79 K/UL — HIGH (ref 3.8–10.5)
WBC # FLD AUTO: 12.79 K/UL — HIGH (ref 3.8–10.5)

## 2018-09-25 PROCEDURE — 71045 X-RAY EXAM CHEST 1 VIEW: CPT

## 2018-09-25 PROCEDURE — 86038 ANTINUCLEAR ANTIBODIES: CPT

## 2018-09-25 PROCEDURE — 87640 STAPH A DNA AMP PROBE: CPT

## 2018-09-25 PROCEDURE — 82330 ASSAY OF CALCIUM: CPT

## 2018-09-25 PROCEDURE — 83935 ASSAY OF URINE OSMOLALITY: CPT

## 2018-09-25 PROCEDURE — 94660 CPAP INITIATION&MGMT: CPT

## 2018-09-25 PROCEDURE — 86708 HEPATITIS A ANTIBODY: CPT

## 2018-09-25 PROCEDURE — 88305 TISSUE EXAM BY PATHOLOGIST: CPT

## 2018-09-25 PROCEDURE — 87102 FUNGUS ISOLATION CULTURE: CPT

## 2018-09-25 PROCEDURE — 82248 BILIRUBIN DIRECT: CPT

## 2018-09-25 PROCEDURE — 87641 MR-STAPH DNA AMP PROBE: CPT

## 2018-09-25 PROCEDURE — 87015 SPECIMEN INFECT AGNT CONCNTJ: CPT

## 2018-09-25 PROCEDURE — 83735 ASSAY OF MAGNESIUM: CPT

## 2018-09-25 PROCEDURE — 76705 ECHO EXAM OF ABDOMEN: CPT

## 2018-09-25 PROCEDURE — C1729: CPT

## 2018-09-25 PROCEDURE — 86803 HEPATITIS C AB TEST: CPT

## 2018-09-25 PROCEDURE — 93005 ELECTROCARDIOGRAM TRACING: CPT | Mod: 76

## 2018-09-25 PROCEDURE — 85730 THROMBOPLASTIN TIME PARTIAL: CPT

## 2018-09-25 PROCEDURE — 86706 HEP B SURFACE ANTIBODY: CPT

## 2018-09-25 PROCEDURE — 82945 GLUCOSE OTHER FLUID: CPT

## 2018-09-25 PROCEDURE — 87205 SMEAR GRAM STAIN: CPT

## 2018-09-25 PROCEDURE — 84132 ASSAY OF SERUM POTASSIUM: CPT

## 2018-09-25 PROCEDURE — 83550 IRON BINDING TEST: CPT

## 2018-09-25 PROCEDURE — 80307 DRUG TEST PRSMV CHEM ANLYZR: CPT

## 2018-09-25 PROCEDURE — 83930 ASSAY OF BLOOD OSMOLALITY: CPT

## 2018-09-25 PROCEDURE — 80076 HEPATIC FUNCTION PANEL: CPT

## 2018-09-25 PROCEDURE — 80053 COMPREHEN METABOLIC PANEL: CPT

## 2018-09-25 PROCEDURE — 86255 FLUORESCENT ANTIBODY SCREEN: CPT

## 2018-09-25 PROCEDURE — 87116 MYCOBACTERIA CULTURE: CPT

## 2018-09-25 PROCEDURE — 87086 URINE CULTURE/COLONY COUNT: CPT

## 2018-09-25 PROCEDURE — P9047: CPT

## 2018-09-25 PROCEDURE — 82803 BLOOD GASES ANY COMBINATION: CPT

## 2018-09-25 PROCEDURE — 99285 EMERGENCY DEPT VISIT HI MDM: CPT

## 2018-09-25 PROCEDURE — 82728 ASSAY OF FERRITIN: CPT

## 2018-09-25 PROCEDURE — 82947 ASSAY GLUCOSE BLOOD QUANT: CPT

## 2018-09-25 PROCEDURE — 72100 X-RAY EXAM L-S SPINE 2/3 VWS: CPT

## 2018-09-25 PROCEDURE — 85610 PROTHROMBIN TIME: CPT

## 2018-09-25 PROCEDURE — 85027 COMPLETE CBC AUTOMATED: CPT

## 2018-09-25 PROCEDURE — 86381 MITOCHONDRIAL ANTIBODY EACH: CPT

## 2018-09-25 PROCEDURE — 85014 HEMATOCRIT: CPT

## 2018-09-25 PROCEDURE — 80048 BASIC METABOLIC PNL TOTAL CA: CPT

## 2018-09-25 PROCEDURE — 82042 OTHER SOURCE ALBUMIN QUAN EA: CPT

## 2018-09-25 PROCEDURE — 87070 CULTURE OTHR SPECIMN AEROBIC: CPT

## 2018-09-25 PROCEDURE — 97116 GAIT TRAINING THERAPY: CPT

## 2018-09-25 PROCEDURE — 87206 SMEAR FLUORESCENT/ACID STAI: CPT

## 2018-09-25 PROCEDURE — 97162 PT EVAL MOD COMPLEX 30 MIN: CPT

## 2018-09-25 PROCEDURE — 84100 ASSAY OF PHOSPHORUS: CPT

## 2018-09-25 PROCEDURE — 83605 ASSAY OF LACTIC ACID: CPT

## 2018-09-25 PROCEDURE — 87040 BLOOD CULTURE FOR BACTERIA: CPT

## 2018-09-25 PROCEDURE — 89051 BODY FLUID CELL COUNT: CPT

## 2018-09-25 PROCEDURE — 84157 ASSAY OF PROTEIN OTHER: CPT

## 2018-09-25 PROCEDURE — 49083 ABD PARACENTESIS W/IMAGING: CPT

## 2018-09-25 PROCEDURE — 83615 LACTATE (LD) (LDH) ENZYME: CPT

## 2018-09-25 PROCEDURE — 84133 ASSAY OF URINE POTASSIUM: CPT

## 2018-09-25 PROCEDURE — 82140 ASSAY OF AMMONIA: CPT

## 2018-09-25 PROCEDURE — 82962 GLUCOSE BLOOD TEST: CPT

## 2018-09-25 PROCEDURE — 82435 ASSAY OF BLOOD CHLORIDE: CPT

## 2018-09-25 PROCEDURE — 97110 THERAPEUTIC EXERCISES: CPT

## 2018-09-25 PROCEDURE — 84295 ASSAY OF SERUM SODIUM: CPT

## 2018-09-25 PROCEDURE — 87075 CULTR BACTERIA EXCEPT BLOOD: CPT

## 2018-09-25 PROCEDURE — 81001 URINALYSIS AUTO W/SCOPE: CPT

## 2018-09-25 PROCEDURE — 97530 THERAPEUTIC ACTIVITIES: CPT

## 2018-09-25 PROCEDURE — 87340 HEPATITIS B SURFACE AG IA: CPT

## 2018-09-25 PROCEDURE — 93970 EXTREMITY STUDY: CPT

## 2018-09-25 PROCEDURE — 82436 ASSAY OF URINE CHLORIDE: CPT

## 2018-09-25 PROCEDURE — 88112 CYTOPATH CELL ENHANCE TECH: CPT

## 2018-09-25 PROCEDURE — 84300 ASSAY OF URINE SODIUM: CPT

## 2018-09-25 PROCEDURE — 86704 HEP B CORE ANTIBODY TOTAL: CPT

## 2018-09-25 RX ORDER — PREGABALIN 225 MG/1
1 CAPSULE ORAL
Qty: 0 | Refills: 0 | COMMUNITY
Start: 2018-09-25

## 2018-09-25 RX ORDER — LACTULOSE 10 G/15ML
30 SOLUTION ORAL
Qty: 0 | Refills: 0 | COMMUNITY
Start: 2018-09-25

## 2018-09-25 RX ORDER — TAMSULOSIN HYDROCHLORIDE 0.4 MG/1
1 CAPSULE ORAL
Qty: 0 | Refills: 0 | COMMUNITY
Start: 2018-09-25

## 2018-09-25 RX ORDER — METOPROLOL TARTRATE 50 MG
12.5 TABLET ORAL
Qty: 0 | Refills: 0 | Status: DISCONTINUED | OUTPATIENT
Start: 2018-09-25 | End: 2018-09-25

## 2018-09-25 RX ORDER — LOSARTAN POTASSIUM 100 MG/1
1 TABLET, FILM COATED ORAL
Qty: 0 | Refills: 0 | COMMUNITY

## 2018-09-25 RX ORDER — FOLIC ACID 0.8 MG
1 TABLET ORAL
Qty: 0 | Refills: 0 | COMMUNITY
Start: 2018-09-25

## 2018-09-25 RX ORDER — THIAMINE MONONITRATE (VIT B1) 100 MG
1 TABLET ORAL
Qty: 0 | Refills: 0 | COMMUNITY
Start: 2018-09-25

## 2018-09-25 RX ORDER — RIVAROXABAN 15 MG-20MG
1 KIT ORAL
Qty: 0 | Refills: 0 | COMMUNITY

## 2018-09-25 RX ORDER — POTASSIUM CHLORIDE 20 MEQ
20 PACKET (EA) ORAL
Qty: 0 | Refills: 0 | COMMUNITY

## 2018-09-25 RX ORDER — METOPROLOL TARTRATE 50 MG
100 TABLET ORAL
Qty: 0 | Refills: 0 | COMMUNITY

## 2018-09-25 RX ORDER — METOPROLOL TARTRATE 50 MG
0.5 TABLET ORAL
Qty: 0 | Refills: 0 | COMMUNITY

## 2018-09-25 RX ORDER — SENNA PLUS 8.6 MG/1
2 TABLET ORAL
Qty: 0 | Refills: 0 | COMMUNITY
Start: 2018-09-25

## 2018-09-25 RX ORDER — DOCUSATE SODIUM 100 MG
1 CAPSULE ORAL
Qty: 0 | Refills: 0 | COMMUNITY
Start: 2018-09-25

## 2018-09-25 RX ORDER — SPIRONOLACTONE 25 MG/1
25 TABLET, FILM COATED ORAL
Qty: 0 | Refills: 0 | COMMUNITY

## 2018-09-25 RX ORDER — FUROSEMIDE 40 MG
1 TABLET ORAL
Qty: 0 | Refills: 0 | COMMUNITY

## 2018-09-25 RX ADMIN — Medication 100 MILLIGRAM(S): at 05:44

## 2018-09-25 RX ADMIN — Medication 100 MILLIGRAM(S): at 11:11

## 2018-09-25 RX ADMIN — PREGABALIN 500 MICROGRAM(S): 225 CAPSULE ORAL at 11:12

## 2018-09-25 RX ADMIN — Medication 1 MILLIGRAM(S): at 11:11

## 2018-09-25 RX ADMIN — SPIRONOLACTONE 50 MILLIGRAM(S): 25 TABLET, FILM COATED ORAL at 05:44

## 2018-09-25 RX ADMIN — Medication 100 MILLIGRAM(S): at 14:05

## 2018-09-25 RX ADMIN — HEPARIN SODIUM 5000 UNIT(S): 5000 INJECTION INTRAVENOUS; SUBCUTANEOUS at 05:44

## 2018-09-25 RX ADMIN — Medication 80 MILLIGRAM(S): at 05:44

## 2018-09-25 NOTE — DISCHARGE NOTE ADULT - MEDICATION SUMMARY - MEDICATIONS TO TAKE
I will START or STAY ON the medications listed below when I get home from the hospital:    tamsulosin 0.4 mg oral capsule  -- 1 cap(s) by mouth once a day (at bedtime)  -- Indication: For Prostate    Metoprolol Tartrate 25 mg oral tablet  -- 0.5 tab(s) by mouth 2 times a day  -- Indication: For Heart rate    lactulose 10 g/15 mL oral syrup  -- 30 milliliter(s) by mouth 2 times a day  -- Indication: For Cirrhosis    docusate sodium 100 mg oral capsule  -- 1 cap(s) by mouth 3 times a day  -- Indication: For Constipation    senna oral tablet  -- 2 tab(s) by mouth once a day (at bedtime)  -- Indication: For Constipation    rifAXIMin 550 mg oral tablet  -- 1 tab(s) by mouth 2 times a day  -- Indication: For Cirrhosis    thiamine 100 mg oral tablet  -- 1 tab(s) by mouth once a day  -- Indication: For Supplement    cyanocobalamin 500 mcg oral tablet  -- 1 tab(s) by mouth once a day  -- Indication: For Supplement    folic acid 1 mg oral tablet  -- 1 tab(s) by mouth once a day  -- Indication: For Supplement

## 2018-09-25 NOTE — PROGRESS NOTE ADULT - ASSESSMENT
52M pmhx of Afib on xarelto, alcoholic hepatitis with cirrhosis and ascities with severe hyponatremia and fluid overloaded    1- hyponatremia  2- ascites  3- alcoholism   4- hypokalemia    na still low   hold lasix hold aldactone   urena 15 g po x1   urine na is low and urine osmolality remains high. pt is also on diuretics   k better   trend hb

## 2018-09-25 NOTE — DISCHARGE NOTE ADULT - CARE PROVIDER_API CALL
Nathan Hassan), Internal Medicine  [address] [place] [address]  Phone: [phone]  Fax: [phone]    Milton Mohr), Internal Medicine  [address] [place] [address]  Phone: [phone]  Fax: [phone]

## 2018-09-25 NOTE — PROGRESS NOTE ADULT - REASON FOR ADMISSION
Abdominal swelling
Abdominal swelling/EtOH hepatitis
Abdominal swelling

## 2018-09-25 NOTE — PROGRESS NOTE ADULT - PROVIDER SPECIALTY LIST ADULT
Gastroenterology
Infectious Disease
Internal Medicine
Intervent Radiology
Nephrology
Infectious Disease
Infectious Disease
Internal Medicine

## 2018-09-25 NOTE — PROGRESS NOTE ADULT - SUBJECTIVE AND OBJECTIVE BOX
[facility]   [phone]  RENAL FOLLOW UP NOTE  --------------------------------------------------------------------------------  Chief Complaint:    24 hour events/subjective:      seen earlier. states overall is weak.   states attempting to limit fluids       PAST HISTORY  --------------------------------------------------------------------------------  No significant changes to PMH, PSH, FHx, SHx, unless otherwise noted    ALLERGIES & MEDICATIONS  --------------------------------------------------------------------------------  Allergies    No Known Allergies    Intolerances      Standing Inpatient Medications  cyanocobalamin 500 MICROGram(s) Oral daily  docusate sodium 100 milliGRAM(s) Oral three times a day  folic acid 1 milliGRAM(s) Oral daily  heparin  Injectable 5000 Unit(s) SubCutaneous every 12 hours  influenza   Vaccine 0.5 milliLiter(s) IntraMuscular once  lactulose Syrup 20 Gram(s) Oral two times a day  metoprolol tartrate 12.5 milliGRAM(s) Oral two times a day  rifaximin 550 milliGRAM(s) Oral two times a day  senna 2 Tablet(s) Oral at bedtime  tamsulosin 0.4 milliGRAM(s) Oral at bedtime  thiamine 100 milliGRAM(s) Oral daily    PRN Inpatient Medications      REVIEW OF SYSTEMS  --------------------------------------------------------------------------------    Gen: denies  fevers/chills,  CVS: denies chest pain/palpitations  Resp: denies SOB/Cough  GI: Denies N/V/Abd pain  : Denies dysuria    All other systems were reviewed and are negative, except as noted.    VITALS/PHYSICAL EXAM  --------------------------------------------------------------------------------  T(C): 36.7 (09-25-18 @ 12:03), Max: 37.1 (09-24-18 @ 20:08)  HR: 96 (09-25-18 @ 12:03) (96 - 116)  BP: 104/70 (09-25-18 @ 12:03) (104/70 - 109/70)  RR: 18 (09-25-18 @ 12:03) (18 - 18)  SpO2: 95% (09-25-18 @ 12:03) (93% - 96%)  Wt(kg): --        09-24-18 @ 07:01  -  09-25-18 @ 07:00  --------------------------------------------------------  IN: 630 mL / OUT: 950 mL / NET: -320 mL    09-25-18 @ 07:01  -  09-25-18 @ 15:44  --------------------------------------------------------  IN: 480 mL / OUT: 1000 mL / NET: -520 mL      Physical Exam:  	  Gen:  tremors   	no jvd , supple neck,   	Pulm: decrease bs  no rales or ronchi or wheezing  	CV: RRR, S1S2; no rub  	Abd: +BS, soft, + ascites  	: No suprapubic tenderness  	UE: Warm, no cyanosis  no clubbing,  no edema; no asterixis  	LE: Warm, no cyanosis  no clubbing,  significantly decreased 2- edema  	  LABS/STUDIES  --------------------------------------------------------------------------------              10.1   12.79 >-----------<  131      [09-25-18 @ 07:58]              29.0     126  |  92  |  14  ----------------------------<  84      [09-25-18 @ 06:14]  3.9   |  25  |  0.77        Ca     8.6     [09-25-18 @ 06:14]      Mg     2.0     [09-24-18 @ 06:23]            Creatinine Trend:  SCr 0.77 [09-25 @ 06:14]  SCr 0.81 [09-24 @ 13:04]  SCr 0.78 [09-24 @ 06:23]  SCr 0.82 [09-23 @ 06:37]  SCr 0.83 [09-22 @ 05:35]              Urinalysis - [09-06-18 @ 01:50]      Color Brown / Appearance SL Turbid / SG 1.018 / pH 6.0      Gluc Negative / Ketone Negative  / Bili Moderate / Urobili 8       Blood Negative / Protein Trace / Leuk Est Negative / Nitrite Negative      RBC 0-2 / WBC 3-5 / Hyaline 0-2 / Gran  / Sq Epi  / Non Sq Epi OCC / Bacteria Few    Urine Sodium <20      [09-25-18 @ 14:18]  Urine Chloride <35      [09-25-18 @ 14:18]  Urine Osmolality 376      [09-25-18 @ 14:19]    Iron 67, TIBC 94, %sat 71      [09-08-18 @ 10:01]  Ferritin 1886      [09-08-18 @ 09:14]    LEANA: titer 1:160, pattern [id number]      [09-08-18 @ 09:14]

## 2018-09-25 NOTE — DISCHARGE NOTE ADULT - PATIENT PORTAL LINK FT
You can access the [facility] Patient Portal, offered by [facility], by registering with the following website: [email or web address]

## 2018-09-25 NOTE — DISCHARGE NOTE ADULT - CARE PLAN
Principal Discharge DX:	Hyponatremia  Goal:	To remain symptom free  Assessment and plan of treatment:	Likely from hypervolemia secondary to ascites.    Only take medicines as directed by your caregiver. Many medicines can make hyponatremia worse. Discuss all your medicines with your caregiver.  Carefully follow any recommended diet, including any fluid restrictions.  You may be asked to repeat lab tests. Follow these directions.  Avoid alcohol and recreational drugs.  SEEK MEDICAL CARE IF:  You develop worsening nausea, fatigue, headache, confusion, or weakness.  Your original hyponatremia symptoms return.  You have problems following the recommended diet.   SEEK IMMEDIATE MEDICAL CARE IF:  You have a seizure.  You faint.  You have ongoing diarrhea or vomiting  Secondary Diagnosis:	Ascites due to alcoholic cirrhosis  Assessment and plan of treatment:	s/p multiple paracentesis  One liter fluid restriction  Secondary Diagnosis:	Severe sepsis  Assessment and plan of treatment:	Now resolved.  You have completed your entire course of antibiotics.  Secondary Diagnosis:	Atrial fibrillation  Assessment and plan of treatment:	You are currently refusing any form of anticoagulation.   Atrial fibrillation is the most common heart rhythm problem & has the risk of stroke & heart attack  It helps if you control your blood pressure, not drink more than 1-2 alcohol drinks per day, cut down on caffeine, getting treatment for over active thyroid gland, & getting exercise  Call your doctor if you feel your heart racing or beating unusually, chest tightness or pain, lightheaded, faint, shortness of breath especially with exercise  It is important to take your heart medication as prescribed  Secondary Diagnosis:	Urinary retention  Assessment and plan of treatment:	Now resolved.  Please

## 2018-09-25 NOTE — PROGRESS NOTE ADULT - ASSESSMENT
Alcoholic Hepatitis  Decompensated Cirrhosis, presumed alcoholic, complicated by ascites, possible SBP s/p short course of unasyn    Rec:  Continue lasix/aldactone, dosing per renal. low salt diet  Continue lactulose/xifaxan for PSE  Negative LE DVT study.  Thiamine, b12, folate  PT/OOB    Xarelto on hold for now

## 2018-09-25 NOTE — DISCHARGE NOTE ADULT - HOSPITAL COURSE
52 year old Male PMH Afib was on Xarelto, Alcoholic hepatitis presents with severe sepsis and ascites. Found to have hypervolemic hyponatremia likely 2/2 ascites upon admission; and during hospital course found to be overdiuresed, medications adjusted.  Sodium levels waxed and waned during hospital course. Patient underwent abdominal paracentesis x 2; did not meet criteria for SBP. Blood cultures were negative. Lower extremities with erythema, more likely edema than cellulitis, did complete a short course of Unasyn.  Patient with a history of atrial fibrillation; patient was on Xarelto prior to admission.  Both patient and girlfriend are very adamant they do not want to be put back on Xarelto. Also admitted with alcohol abuse, placed on CIWA and tapered off Librium. Patient stable for discharge to Rehab.  Follow up with GI, PMD.

## 2018-09-25 NOTE — DISCHARGE NOTE ADULT - PLAN OF CARE
To remain symptom free Likely from hypervolemia secondary to ascites.    Only take medicines as directed by your caregiver. Many medicines can make hyponatremia worse. Discuss all your medicines with your caregiver.  Carefully follow any recommended diet, including any fluid restrictions.  You may be asked to repeat lab tests. Follow these directions.  Avoid alcohol and recreational drugs.  SEEK MEDICAL CARE IF:  You develop worsening nausea, fatigue, headache, confusion, or weakness.  Your original hyponatremia symptoms return.  You have problems following the recommended diet.   SEEK IMMEDIATE MEDICAL CARE IF:  You have a seizure.  You faint.  You have ongoing diarrhea or vomiting s/p multiple paracentesis  One liter fluid restriction Now resolved.  You have completed your entire course of antibiotics. You are currently refusing any form of anticoagulation.   Atrial fibrillation is the most common heart rhythm problem & has the risk of stroke & heart attack  It helps if you control your blood pressure, not drink more than 1-2 alcohol drinks per day, cut down on caffeine, getting treatment for over active thyroid gland, & getting exercise  Call your doctor if you feel your heart racing or beating unusually, chest tightness or pain, lightheaded, faint, shortness of breath especially with exercise  It is important to take your heart medication as prescribed Now resolved.  Please

## 2018-09-25 NOTE — DISCHARGE NOTE ADULT - MEDICATION SUMMARY - MEDICATIONS TO CHANGE
I will SWITCH the dose or number of times a day I take the medications listed below when I get home from the hospital:    metoprolol  -- 100 milligram(s) by mouth once a day

## 2018-09-25 NOTE — DISCHARGE NOTE ADULT - MEDICATION SUMMARY - MEDICATIONS TO STOP TAKING
I will STOP taking the medications listed below when I get home from the hospital:    Xarelto 20 mg oral tablet  -- 1 tab(s) by mouth once a day (in the evening)    spironolactone  -- 25 milligram(s) by mouth once a day    Lasix 40 mg oral tablet  -- 1 tab(s) by mouth once a day    potassium chloride  -- 20 milliequivalent(s) by mouth once a day    losartan 50 mg oral tablet  -- 1 tab(s) by mouth once a day

## 2018-09-25 NOTE — PROGRESS NOTE ADULT - SUBJECTIVE AND OBJECTIVE BOX
More awake today.  Morley removed.  Required enema overnight.  Urinating well.  Na improved with diuretic adjustment.    MEDICATIONS  (STANDING):  cyanocobalamin 500 MICROGram(s) Oral daily  docusate sodium 100 milliGRAM(s) Oral three times a day  folic acid 1 milliGRAM(s) Oral daily  furosemide    Tablet 80 milliGRAM(s) Oral two times a day  heparin  Injectable 5000 Unit(s) SubCutaneous every 12 hours  influenza   Vaccine 0.5 milliLiter(s) IntraMuscular once  lactulose Syrup 20 Gram(s) Oral two times a day  rifaximin 550 milliGRAM(s) Oral two times a day  senna 2 Tablet(s) Oral at bedtime  spironolactone 100 milliGRAM(s) Oral daily  tamsulosin 0.4 milliGRAM(s) Oral at bedtime  thiamine 100 milliGRAM(s) Oral daily    MEDICATIONS  (PRN):      Allergies    No Known Allergies      LABS:                        10.1   12.79 )-----------( 131      ( 25 Sep 2018 07:58 )             29.0     09-25    126<L>  |  92<L>  |  14  ----------------------------<  84  3.9   |  25  |  0.77    Ca    8.6      25 Sep 2018 06:14  Mg     2.0     09-24      PHYSICAL EXAM:      Constitutional: NAD, well-developed  HEENT: anicteric  Respiratory: CTA and P  Cardiovascular: S1 and S2, RRR, no M  Gastrointestinal: abdomen distended but soft; NTND  Extremities: + b/l edema  Neurological: A/O x 3, no focal deficits; no asterixis but tremulous  Psychiatric: blunted affect

## 2018-10-10 LAB
CULTURE RESULTS: SIGNIFICANT CHANGE UP
SPECIMEN SOURCE: SIGNIFICANT CHANGE UP

## 2018-10-19 ENCOUNTER — APPOINTMENT (OUTPATIENT)
Dept: HEPATOLOGY | Facility: CLINIC | Age: 53
End: 2018-10-19

## 2018-10-31 LAB
CULTURE RESULTS: SIGNIFICANT CHANGE UP
SPECIMEN SOURCE: SIGNIFICANT CHANGE UP

## 2018-11-28 ENCOUNTER — TRANSCRIPTION ENCOUNTER (OUTPATIENT)
Age: 53
End: 2018-11-28

## 2019-01-02 ENCOUNTER — APPOINTMENT (OUTPATIENT)
Dept: PULMONOLOGY | Facility: CLINIC | Age: 54
End: 2019-01-02
Payer: COMMERCIAL

## 2019-01-02 PROCEDURE — 82803 BLOOD GASES ANY COMBINATION: CPT

## 2019-01-02 PROCEDURE — 36600 WITHDRAWAL OF ARTERIAL BLOOD: CPT | Mod: 59

## 2019-01-04 ENCOUNTER — OUTPATIENT (OUTPATIENT)
Dept: OUTPATIENT SERVICES | Facility: HOSPITAL | Age: 54
LOS: 1 days | End: 2019-01-04
Payer: COMMERCIAL

## 2019-01-04 ENCOUNTER — RESULT REVIEW (OUTPATIENT)
Age: 54
End: 2019-01-04

## 2019-01-04 DIAGNOSIS — M72.0 PALMAR FASCIAL FIBROMATOSIS [DUPUYTREN]: Chronic | ICD-10-CM

## 2019-01-04 DIAGNOSIS — D12.0 BENIGN NEOPLASM OF CECUM: ICD-10-CM

## 2019-01-04 DIAGNOSIS — K70.30 ALCOHOLIC CIRRHOSIS OF LIVER WITHOUT ASCITES: ICD-10-CM

## 2019-01-04 PROCEDURE — 43239 EGD BIOPSY SINGLE/MULTIPLE: CPT

## 2019-01-04 PROCEDURE — 88305 TISSUE EXAM BY PATHOLOGIST: CPT

## 2019-01-04 PROCEDURE — G0105: CPT

## 2019-01-04 PROCEDURE — 88305 TISSUE EXAM BY PATHOLOGIST: CPT | Mod: 26

## 2019-01-04 PROCEDURE — 88312 SPECIAL STAINS GROUP 1: CPT | Mod: 26

## 2019-01-04 PROCEDURE — 88312 SPECIAL STAINS GROUP 1: CPT

## 2019-01-11 NOTE — PATIENT PROFILE ADULT. - MEDICATION ADMINISTRATION INFO, PROFILE
"Requested Prescriptions   Pending Prescriptions Disp Refills     omega-3 acid ethyl esters (LOVAZA) 1 g capsule  Last Written Prescription Date:  08/02/18  Last Fill Quantity: 360 capsule,  # refills: 1   Last office visit: 12/18/2018 with prescribing provider:  Dr. Lyles   Future Office Visit:   Next 5 appointments (look out 90 days)    Jan 23, 2019  9:00 AM CST  [place] with Ksenia Lyles MD  [facility] ([facility] [address] [place] [id number]  [phone]   Jan 30, 2019  1:20 PM CST  Return Visit with Oneil Baxter MD  [facility] ([facility]) [address] [place] [id number] [phone]   Feb 12, 2019  2:00 PM CST  SHORT with Radha Mcdonald [facility] ([facility] [address] [place] [phone]  [phone]          360 capsule 1    Antihyperlipidemic agents Passed - 1/11/2019 10:46 AM       Passed - Lipid panel on file in past 12 mos    Recent Labs   Lab Test 12/24/18  1114  12/03/14  0958   CHOL 152   < > 189   TRIG 434*   < > 487*   HDL 34*   < > 27*   LDL Cannot estimate LDL when triglyceride exceeds 400 mg/dL   < > Cannot estimate LDL when triglyceride exceeds 400 mg/dL  108   NHDL 118   < >  --    VLDL  --   --  Cannot estimate VLDL when triglyceride exceeds 400 mg/dL.   CHOLHDLRATIO  --   --  7.0*    < > = values in this interval not displayed.              Passed - Normal serum ALT on record in past 12 mos    Recent Labs   Lab Test 01/03/19  0844   ALT 67            Passed - Recent (12 mo) or future (30 days) visit within the authorizing provider's specialty    Patient had office visit in the last 12 months or has a visit in the next 30 days with authorizing provider or within the authorizing provider's specialty.  See \"Patient Info\" tab in inbasket, or \"Choose Columns\" in Meds & Orders section of the refill " encounter.             Passed - Medication is active on med list       Passed - Patient is age 18 years or older           no concerns

## 2019-03-14 ENCOUNTER — TRANSCRIPTION ENCOUNTER (OUTPATIENT)
Age: 54
End: 2019-03-14

## 2019-07-31 NOTE — PROGRESS NOTE ADULT - ASSESSMENT
52M pmhx of Afib on xarelto, alcoholic hepatitis with cirrhosis and ascities with severe hyponatremia and fluid overloaded    1- hyponatremia  2- ascites  3- alcoholism     na improving he remains tachy   increase lasix to 40 mg iv bid   one liter fluid restriction   CIWA protocol   thiamine [Alert] : alert [Playful] : playful [No Acute Distress] : no acute distress [Conjunctivae with no discharge] : conjunctivae with no discharge [Normocephalic] : normocephalic [PERRL] : PERRL [EOMI Bilateral] : EOMI bilateral [Auricles Well Formed] : auricles well formed [Clear Tympanic membranes with present light reflex and bony landmarks] : clear tympanic membranes with present light reflex and bony landmarks [No Discharge] : no discharge [Palate Intact] : palate intact [Pink Nasal Mucosa] : pink nasal mucosa [Nares Patent] : nares patent [Uvula Midline] : uvula midline [Nonerythematous Oropharynx] : nonerythematous oropharynx [No Caries] : no caries [Trachea Midline] : trachea midline [Supple, full passive range of motion] : supple, full passive range of motion [No Palpable Masses] : no palpable masses [Symmetric Chest Rise] : symmetric chest rise [Normoactive Precordium] : normoactive precordium [Clear to Ausculatation Bilaterally] : clear to auscultation bilaterally [Regular Rate and Rhythm] : regular rate and rhythm [Normal S1, S2 present] : normal S1, S2 present [No Murmurs] : no murmurs [+2 Femoral Pulses] : +2 femoral pulses [Soft] : soft [NonTender] : non tender [Non Distended] : non distended [Normoactive Bowel Sounds] : normoactive bowel sounds [No Hepatomegaly] : no hepatomegaly [No Splenomegaly] : no splenomegaly [Rob 1] : Rob 1 [Testicles Descended Bilaterally] : testicles descended bilaterally [Central Urethral Opening] : central urethral opening [Patent] : patent [Normally Placed] : normally placed [No Abnormal Lymph Nodes Palpated] : no abnormal lymph nodes palpated [Symmetric Buttocks Creases] : symmetric buttocks creases [No Gait Asymmetry] : no gait asymmetry [Symmetric Hip Rotation] : symmetric hip rotation [No pain or deformities with palpation of bone, muscles, joints] : no pain or deformities with palpation of bone, muscles, joints [Normal Muscle Tone] : normal muscle tone [NoTuft of Hair] : no tuft of hair [No Spinal Dimple] : no spinal dimple [Straight] : straight [+2 Patella DTR] : +2 patella DTR [Cranial Nerves Grossly Intact] : cranial nerves grossly intact [No Rash or Lesions] : no rash or lesions

## 2019-10-12 NOTE — ED ADULT NURSE NOTE - CHPI ED NUR SYMPTOMS NEG
SI[facility]
no nausea/no burning urination/no dysuria/no fever/no hematuria/no chills/no diarrhea/no abdominal distension/no vomiting/no blood in stool

## 2020-10-27 NOTE — ED ADULT NURSE NOTE - CHIEF COMPLAINT QUOTE
SPEECH/LANGUAGE PATHOLOGY  CLINICAL ASSESSMENT OF SWALLOWING FUNCTION    PATIENT NAME:  Melo Ramírez      :  1944      TODAY'S DATE:  10/27/2020  ROOM:  0212/0212-A    SUMMARY OF EVALUATION     DYSPHAGIA DIAGNOSIS:  Inconclusive due to pt refusal of all items    SLP attempted puree per spoon and liquid per cup and straw. Pt turned head away, however opened mouth with each attempt. Pt then closed mouth when spoon or cup touched lips. Above may be due to apraxia and poor motor planning. SLP to continue to attempt and assess. DIET RECOMMENDATIONS:  NPO (nothing by mouth including oral meds) until swallowing able to be assessed     FEEDING RECOMMENDATIONS:     Assistance level:  Not applicable      Compensatory strategies recommended: Not applicable    THERAPY RECOMMENDATIONS:        Repeat Bedside Swallow evaluation to be to be completed                  PROCEDURE     Consistencies Administered During the Evaluation   Liquids: thin liquid   Solids:  pureed foods      Method of Intake:   cup, spoon  Fed by clinician      Position:   Seated, upright                  RESULTS     See above                  The Speech Language Pathologist (SLP) completed education with the patient regarding results of evaluation. Explained that Speech Pathology intervention is warranted  at this time   Prognosis for improvements is guarded dependent upon etiology of swallow dysfunction     This plan will be re-evaluated and revised in 1 week  if warranted. Patient stated goals: Did not state,   Treatment goals discussed with Patient and POA  The POA understand(s) the diagnosis, prognosis and plan of care         INTERVENTION/EDUCATION    Pt/POA educated on above results and plan of care. Pt/POA trained on compensatory strategies for safe swallow if oral diet initiated with good outcome. Pt/POA encouraged to engage in question/answer session. All questions answered and pt verbalized understanding of above.          CPT jaundice, ascites, bilateral leg swelling, hx of liver disease. denies fever/chills/sob

## 2020-11-02 NOTE — PROGRESS NOTE ADULT - ASSESSMENT
Patient has only two days of meds left   By the time mail order arrive will be completely out     Like to have 10 day emergency refill    verapamil 120 MG tablet quantity 20    levothyroxine for one a day   send local pharmacy [facility]     Please send full refill   Verapamil 120 mg   levothyroxine (SYNTHROID, LEVOTHROID) 150 MCG tablet    For 90 days supply     [id number] - [place], [place] - [address] [facility] [facility]   52-yo Male with pmhx of Afib on Xarelto, Alcoholic hepatitis presents with severe sepsis and ascites

## 2021-08-15 NOTE — DIETITIAN INITIAL EVALUATION ADULT. - NS AS NUTRI DX NUTRIENT
Pt was intubated in the ED with 7.5 ETT secured 23 at the lip, confirmed with ETCO2 and CXR. Pt was place on transport vent with (Rate 16, Vt 450, PEEP of 5 and 50% FIO2). Pt was taken to CT and was brought back to the ED. RT will continue to follow.   protein/Increased nutrient needs (specify)

## 2021-10-15 NOTE — PROGRESS NOTE ADULT - SUBJECTIVE AND OBJECTIVE BOX
urinating spontaneously  no fevers or chills  no reported shaking of the body or extremities    Vital Signs Last 24 Hrs  T(C): 37 (07 Sep 2018 08:00), Max: 37.1 (06 Sep 2018 20:10)  T(F): 98.6 (07 Sep 2018 08:00), Max: 98.8 (06 Sep 2018 20:10)  HR: 114 (07 Sep 2018 08:00) (109 - 120)  BP: 100/46 (07 Sep 2018 08:00) (100/46 - 119/69)  BP(mean): --  RR: 18 (07 Sep 2018 08:00) (16 - 18)  SpO2: 95% (07 Sep 2018 08:00) (92% - 96%)    GENERAL: NAD, AAOx3  HEAD:  Atraumatic, Normocephalic  EYES: EOMI, (+)icterus  NECK: Supple, No JVD, No LAD  CHEST/LUNG: Clear to auscultation bilaterally; No wheeze  HEART: Regular rate and rhythm; No murmurs, rubs, or gallops  ABDOMEN: Markedly distended without focal tenderness  EXTREMITIES:  2+ Peripheral Pulses, No clubbing, cyanosis, or edema  SKIN: Anasarca with beefy red skin rash that is warm to touch, confluent, non blanching, non purulent.  NEURO: nonfocal CN/motor/sensory/reflexes    LABS:                        10.8   13.20 )-----------( 147      ( 06 Sep 2018 07:49 )             30.1     09-07    122<L>  |  84<L>  |  9   ----------------------------<  105<H>  4.5   |  26  |  0.90    Ca    8.0<L>      07 Sep 2018 06:33  Phos  2.4     09-05  Mg     1.8     09-06    TPro  6.4  /  Alb  2.2<L>  /  TBili  9.0<H>  /  DBili  x   /  AST  181<H>  /  ALT  76<H>  /  AlkPhos  140<H>  09-    PT/INR - ( 06 Sep 2018 23:00 )   PT: 42.0 sec;   INR: 3.75 ratio         PTT - ( 06 Sep 2018 07:49 )  PTT:42.2 sec  CAPILLARY BLOOD GLUCOSE            Urinalysis Basic - ( 06 Sep 2018 01:50 )    Color: Brown / Appearance: SL Turbid / S.018 / pH: x  Gluc: x / Ketone: Negative  / Bili: Moderate / Urobili: 8 mg/dL   Blood: x / Protein: Trace / Nitrite: Negative   Leuk Esterase: Negative / RBC: 0-2 /HPF / WBC 3-5 /HPF   Sq Epi: x / Non Sq Epi: OCC /HPF / Bacteria: Few /HPF Difficulty swallowing

## 2022-09-15 NOTE — ED ADULT TRIAGE NOTE - TEMPERATURE IN CELSIUS (DEGREES C)
Patient was having hypertensive readings at home and headaches. Have they increased since being on the new medication? Is his blood pressure any better controlled?     He can stop the Nifedipine and we can go back to his [facility] if he would prefer.    37

## 2022-10-21 NOTE — PROGRESS NOTE ADULT - PROBLEM SELECTOR PROBLEM 3
4/9/21 LTKA  
Severe sepsis
Ascites
Ascites
Severe sepsis
Severe sepsis
Ascites
Erythema of lower extremity
Hypokalemia
Severe sepsis

## 2023-05-07 NOTE — PROCEDURE NOTE - NSTIMEOUT_GEN_A_CORE
No care due was identified.  [facility] Embedded Care Due Messages. Reference number: [id number].   5/07/2023 8:06:28 AM CDT   Patient's first and last name, , procedure, and correct site confirmed prior to the start of procedure.

## 2024-01-05 NOTE — ED ADULT TRIAGE NOTE - MODE OF ARRIVAL
Telephone Call RE:  Appointment reminder     Outcome:     [x] Patient confirmed appointment   [] Patient rescheduled appointment for    [] Unable to reach  [] Left message              [] Other:         
Walk in

## 2024-02-26 NOTE — PROGRESS NOTE ADULT - SUBJECTIVE AND OBJECTIVE BOX
Sleepy but arousable    Vital Signs Last 24 Hrs  T(C): 36.8 (10 Sep 2018 04:00), Max: 37 (10 Sep 2018 00:06)  T(F): 98.3 (10 Sep 2018 04:00), Max: 98.6 (10 Sep 2018 00:06)  HR: 109 (10 Sep 2018 04:00) (109 - 122)  BP: 105/67 (10 Sep 2018 04:00) (105/67 - 117/66)  BP(mean): --  RR: 18 (10 Sep 2018 04:00) (18 - 18)  SpO2: 92% (10 Sep 2018 04:00) (92% - 95%)    GENERAL: NAD, AAOx3  HEAD:  Atraumatic, Normocephalic  EYES: EOMI, (+)icterus  NECK: Supple, No JVD, No LAD  CHEST/LUNG: Clear to auscultation bilaterally; No wheeze  HEART: Regular rate and rhythm; No murmurs, rubs, or gallops  ABDOMEN: Markedly distended without focal tenderness  EXTREMITIES:  2+ Peripheral Pulses, No clubbing, cyanosis, or edema  SKIN: Anasarca with beefy red skin rash that is warm to touch, confluent, non blanching, non purulent.  NEURO: nonfocal CN/motor/sensory/reflexes    LABS:                        10.8   14.62 )-----------( 135      ( 10 Sep 2018 09:29 )             32.1     09-10    127<L>  |  85<L>  |  14  ----------------------------<  108<H>  3.1<L>   |  26  |  0.83    Ca    8.5      10 Sep 2018 07:21    TPro  5.9<L>  /  Alb  2.2<L>  /  TBili  5.5<H>  /  DBili  x   /  AST  162<H>  /  ALT  66<H>  /  AlkPhos  128<H>  09-09    PT/INR - ( 10 Sep 2018 09:24 )   PT: 18.3 sec;   INR: 1.60 ratio           CAPILLARY BLOOD GLUCOSE 5